# Patient Record
Sex: MALE | Race: ASIAN | NOT HISPANIC OR LATINO | URBAN - METROPOLITAN AREA
[De-identification: names, ages, dates, MRNs, and addresses within clinical notes are randomized per-mention and may not be internally consistent; named-entity substitution may affect disease eponyms.]

---

## 2017-01-12 ENCOUNTER — INPATIENT (INPATIENT)
Facility: HOSPITAL | Age: 32
LOS: 28 days | Discharge: HOME | DRG: 885 | End: 2017-02-10
Attending: PSYCHIATRY & NEUROLOGY | Admitting: PSYCHIATRY & NEUROLOGY
Payer: COMMERCIAL

## 2017-01-12 VITALS — TEMPERATURE: 98 F

## 2017-01-12 LAB
ALBUMIN SERPL ELPH-MCNC: 4.1 G/DL — SIGNIFICANT CHANGE UP (ref 3.4–5)
ALP SERPL-CCNC: 55 U/L — SIGNIFICANT CHANGE UP (ref 40–120)
ALT FLD-CCNC: 34 U/L — SIGNIFICANT CHANGE UP (ref 12–42)
ANION GAP SERPL CALC-SCNC: 15 MMOL/L — SIGNIFICANT CHANGE UP (ref 9–16)
APAP SERPL-MCNC: <2 UG/ML — SIGNIFICANT CHANGE UP (ref 10–30)
APPEARANCE UR: CLEAR — SIGNIFICANT CHANGE UP
APTT BLD: 29.8 SEC — SIGNIFICANT CHANGE UP (ref 27.5–36.5)
AST SERPL-CCNC: 14 U/L — LOW (ref 15–37)
BASOPHILS NFR BLD AUTO: 0.6 % — SIGNIFICANT CHANGE UP (ref 0–2)
BILIRUB SERPL-MCNC: 0.3 MG/DL — SIGNIFICANT CHANGE UP (ref 0.2–1.2)
BILIRUB UR-MCNC: NEGATIVE — SIGNIFICANT CHANGE UP
BUN SERPL-MCNC: 22 MG/DL — SIGNIFICANT CHANGE UP (ref 7–23)
CALCIUM SERPL-MCNC: 9.4 MG/DL — SIGNIFICANT CHANGE UP (ref 8.5–10.5)
CHLORIDE SERPL-SCNC: 104 MMOL/L — SIGNIFICANT CHANGE UP (ref 96–108)
CK MB BLD-MCNC: 0.97 % — SIGNIFICANT CHANGE UP
CK MB CFR SERPL CALC: 0.6 NG/ML — SIGNIFICANT CHANGE UP (ref 0.5–3.6)
CO2 SERPL-SCNC: 23 MMOL/L — SIGNIFICANT CHANGE UP (ref 22–31)
COLOR SPEC: YELLOW — SIGNIFICANT CHANGE UP
CREAT SERPL-MCNC: 1.24 MG/DL — SIGNIFICANT CHANGE UP (ref 0.5–1.3)
DIFF PNL FLD: NEGATIVE — SIGNIFICANT CHANGE UP
EOSINOPHIL NFR BLD AUTO: 1 % — SIGNIFICANT CHANGE UP (ref 0–6)
ETHANOL SERPL-MCNC: 100 MG/DL — HIGH
GLUCOSE SERPL-MCNC: 152 MG/DL — HIGH (ref 70–99)
GLUCOSE UR QL: NEGATIVE — SIGNIFICANT CHANGE UP
HCT VFR BLD CALC: 42.2 % — SIGNIFICANT CHANGE UP (ref 39–50)
HGB BLD-MCNC: 14.5 G/DL — SIGNIFICANT CHANGE UP (ref 13–17)
IMM GRANULOCYTES NFR BLD AUTO: 1 % — SIGNIFICANT CHANGE UP (ref 0–1.5)
INR BLD: 1.13 — SIGNIFICANT CHANGE UP (ref 0.88–1.16)
KETONES UR-MCNC: (no result) MG/DL
LACTATE SERPL-SCNC: 2.2 MMOL/L — HIGH (ref 0.4–2)
LACTATE SERPL-SCNC: 4 MMOL/L — CRITICAL HIGH (ref 0.4–2)
LACTATE SERPL-SCNC: 4.8 MMOL/L — CRITICAL HIGH (ref 0.4–2)
LEUKOCYTE ESTERASE UR-ACNC: NEGATIVE — SIGNIFICANT CHANGE UP
LIDOCAIN IGE QN: 87 U/L — SIGNIFICANT CHANGE UP (ref 73–393)
LYMPHOCYTES # BLD AUTO: 23.4 % — SIGNIFICANT CHANGE UP (ref 13–44)
MAGNESIUM SERPL-MCNC: 1.8 MG/DL — SIGNIFICANT CHANGE UP (ref 1.6–2.4)
MCHC RBC-ENTMCNC: 30.7 PG — SIGNIFICANT CHANGE UP (ref 27–34)
MCHC RBC-ENTMCNC: 34.4 G/DL — SIGNIFICANT CHANGE UP (ref 32–36)
MCV RBC AUTO: 89.2 FL — SIGNIFICANT CHANGE UP (ref 80–100)
MONOCYTES NFR BLD AUTO: 7 % — SIGNIFICANT CHANGE UP (ref 2–14)
NEUTROPHILS NFR BLD AUTO: 67 % — SIGNIFICANT CHANGE UP (ref 43–77)
NITRITE UR-MCNC: NEGATIVE — SIGNIFICANT CHANGE UP
PH UR: 6 — SIGNIFICANT CHANGE UP (ref 4–8.1)
PLATELET # BLD AUTO: 349 K/UL — SIGNIFICANT CHANGE UP (ref 150–400)
POTASSIUM SERPL-MCNC: 3.5 MMOL/L — SIGNIFICANT CHANGE UP (ref 3.5–5.3)
POTASSIUM SERPL-SCNC: 3.5 MMOL/L — SIGNIFICANT CHANGE UP (ref 3.5–5.3)
PROT SERPL-MCNC: 7.7 G/DL — SIGNIFICANT CHANGE UP (ref 6.4–8.2)
PROT UR-MCNC: 30 MG/DL
PROTHROM AB SERPL-ACNC: 12.5 SEC — SIGNIFICANT CHANGE UP (ref 10–13.1)
RBC # BLD: 4.73 M/UL — SIGNIFICANT CHANGE UP (ref 4.2–5.8)
RBC # FLD: 12.9 % — SIGNIFICANT CHANGE UP (ref 10.3–16.9)
SALICYLATES SERPL-MCNC: 1.3 MG/DL — LOW (ref 2.8–20)
SODIUM SERPL-SCNC: 142 MMOL/L — SIGNIFICANT CHANGE UP (ref 132–145)
SP GR SPEC: 1.02 — SIGNIFICANT CHANGE UP (ref 1–1.03)
TROPONIN I SERPL-MCNC: <0.017 NG/ML — LOW (ref 0.02–0.06)
UROBILINOGEN FLD QL: 0.2 E.U./DL — SIGNIFICANT CHANGE UP
WBC # BLD: 16.2 K/UL — HIGH (ref 3.8–10.5)
WBC # FLD AUTO: 16.2 K/UL — HIGH (ref 3.8–10.5)

## 2017-01-12 PROCEDURE — 93010 ELECTROCARDIOGRAM REPORT: CPT

## 2017-01-12 PROCEDURE — 71010: CPT | Mod: 26

## 2017-01-12 PROCEDURE — 71250 CT THORAX DX C-: CPT | Mod: 26

## 2017-01-12 PROCEDURE — 99291 CRITICAL CARE FIRST HOUR: CPT | Mod: 25

## 2017-01-12 PROCEDURE — 90792 PSYCH DIAG EVAL W/MED SRVCS: CPT | Mod: GT

## 2017-01-12 RX ORDER — PIPERACILLIN AND TAZOBACTAM 4; .5 G/20ML; G/20ML
3.38 INJECTION, POWDER, LYOPHILIZED, FOR SOLUTION INTRAVENOUS ONCE
Qty: 0 | Refills: 0 | Status: COMPLETED | OUTPATIENT
Start: 2017-01-12 | End: 2017-01-12

## 2017-01-12 RX ORDER — SODIUM CHLORIDE 9 MG/ML
2500 INJECTION INTRAMUSCULAR; INTRAVENOUS; SUBCUTANEOUS ONCE
Qty: 0 | Refills: 0 | Status: COMPLETED | OUTPATIENT
Start: 2017-01-12 | End: 2017-01-12

## 2017-01-12 RX ORDER — SODIUM CHLORIDE 9 MG/ML
1000 INJECTION INTRAMUSCULAR; INTRAVENOUS; SUBCUTANEOUS ONCE
Qty: 0 | Refills: 0 | Status: COMPLETED | OUTPATIENT
Start: 2017-01-12 | End: 2017-01-12

## 2017-01-12 RX ADMIN — PIPERACILLIN AND TAZOBACTAM 200 GRAM(S): 4; .5 INJECTION, POWDER, LYOPHILIZED, FOR SOLUTION INTRAVENOUS at 17:09

## 2017-01-12 RX ADMIN — SODIUM CHLORIDE 2000 MILLILITER(S): 9 INJECTION INTRAMUSCULAR; INTRAVENOUS; SUBCUTANEOUS at 17:09

## 2017-01-12 RX ADMIN — SODIUM CHLORIDE 1000 MILLILITER(S): 9 INJECTION INTRAMUSCULAR; INTRAVENOUS; SUBCUTANEOUS at 20:44

## 2017-01-12 NOTE — ED PROVIDER NOTE - PMH
ADD (attention deficit disorder)    Bipolar disorder    HTN (hypertension)    Polysubstance abuse    Seizures

## 2017-01-12 NOTE — ED PROVIDER NOTE - NS ED MD SCRIBE ATTENDING SCRIBE SECTIONS
HIV/PHYSICAL EXAM/REVIEW OF SYSTEMS/PAST MEDICAL/SURGICAL/SOCIAL HISTORY/HISTORY OF PRESENT ILLNESS/VITAL SIGNS( Pullset) HIV/VITAL SIGNS( Pullset)/PAST MEDICAL/SURGICAL/SOCIAL HISTORY/PROGRESS NOTE/REVIEW OF SYSTEMS/HISTORY OF PRESENT ILLNESS/PHYSICAL EXAM

## 2017-01-12 NOTE — ED PROVIDER NOTE - OBJECTIVE STATEMENT
32 y/o M BIBEMS after being found unresponsive outside, given 1mg Narcan intranasal by EMS. As per paperwork found on pt's person, he was discharges from psychiatric floor at [facility] today. Admission reason was suicidal ideation. Pt has history of bipolar effective d/o, alcohol use d/o, and polysubstance use d/o. Current medications include Risperdal, Lamictal, nicorette gum, and Atarax. 32 y/o M BIBEMS after being found unresponsive today. Given 1mg Narcan intranasal by EMS. Bystanders called after pt had a seizure and pt seized again en route to ED. As per paperwork found on pt's person, he was discharges from psychiatric floor at [facility] today. Admission reason was suicidal ideation. Pt has history of bipolar effective d/o, alcohol use d/o, and polysubstance use d/o. Current medications include Risperdal, Lamictal, nicorette gum, and Atarax. 32 y/o M BIBEMS after being found unresponsive today. Given 1mg Narcan intranasal by EMS. Bystanders called after pt had a seizure and pt seized again en route to ED. As per paperwork found on pt's person, he was discharges from psychiatric floor at [facility] today. Admission reason was suicidal ideation. Pt has history of bipolar effective d/o, alcohol use d/o, and polysubstance use d/o. Current medications include Risperdal, Lamictal, Nicorette gum, and Atarax.

## 2017-01-12 NOTE — ED PROVIDER NOTE - CRITICAL CARE PROVIDED
additional history taking/consultation with other physicians/interpretation of diagnostic studies/direct patient care (not related to procedure)/documentation

## 2017-01-12 NOTE — ED PROVIDER NOTE - SKIN, MLM
Skin normal color for race, warm, dry and intact. No evidence of rash. Not diaphoretic. No lacerations.

## 2017-01-12 NOTE — ED PROVIDER NOTE - MEDICAL DECISION MAKING DETAILS
Pt with AMS and tachycardia with borderline hypoTN on arrival.  IV, IV hydration, stat HCT, labs, CXR, ekg.  Poison control center was consulted.  Followed up with them once pt regained consciousness and denies any ingestion.  AMS likely 2/2 post ictal state.

## 2017-01-12 NOTE — ED PROVIDER NOTE - PROGRESS NOTE DETAILS
Pt is now awake and alert, still drowsy. Reports history of seizure d/o but cannot report what medication he takes for seizures. Denies drug use. Denies SI or suicide attempt today. Denies overdose on any of his medications. Will continue to observe. Given CXR findings will perform CT of chest to better delineate for PNA.  Pt now reporting SI again - no specific plan.  placed on 1:1.  Will consult psych once medically cleared. Called radiology resident and does not believe CT scan shows PNA.  More likely atelectasis.  Lactate is trending down and likely elevated from szr.  Szr likely break through szr given szr hx.  Pt is medically cleared for discharge or psych admission. CT inconclusive as per official report, will cover w azithro, pt looks well, no distress, ativan given as this is his usual "seizure medication", explained to Psych physician to give azithro 250 mg for 4 more days. Pt's lungs sounds are normal, no wheezing, no rhonchi. Stable for admission to Psych

## 2017-01-12 NOTE — ED BEHAVIORAL HEALTH NOTE - BEHAVIORAL HEALTH NOTE
Meeting with patient attempted however Full Screen Not Performed due to   Severity of illness/mental status

## 2017-01-13 DIAGNOSIS — F10.20 ALCOHOL DEPENDENCE, UNCOMPLICATED: ICD-10-CM

## 2017-01-13 DIAGNOSIS — F12.20 CANNABIS DEPENDENCE, UNCOMPLICATED: ICD-10-CM

## 2017-01-13 DIAGNOSIS — F19.94 OTHER PSYCHOACTIVE SUBSTANCE USE, UNSPECIFIED WITH PSYCHOACTIVE SUBSTANCE-INDUCED MOOD DISORDER: ICD-10-CM

## 2017-01-13 LAB
CULTURE RESULTS: NO GROWTH — SIGNIFICANT CHANGE UP
SPECIMEN SOURCE: SIGNIFICANT CHANGE UP

## 2017-01-13 PROCEDURE — 99223 1ST HOSP IP/OBS HIGH 75: CPT

## 2017-01-13 RX ORDER — HYDROXYZINE HCL 10 MG
50 TABLET ORAL AT BEDTIME
Qty: 0 | Refills: 0 | Status: DISCONTINUED | OUTPATIENT
Start: 2017-01-13 | End: 2017-02-10

## 2017-01-13 RX ORDER — NICOTINE POLACRILEX 2 MG
1 GUM BUCCAL DAILY
Qty: 0 | Refills: 0 | Status: DISCONTINUED | OUTPATIENT
Start: 2017-01-13 | End: 2017-02-10

## 2017-01-13 RX ORDER — AZITHROMYCIN 500 MG/1
250 TABLET, FILM COATED ORAL DAILY
Qty: 0 | Refills: 0 | Status: COMPLETED | OUTPATIENT
Start: 2017-01-13 | End: 2017-01-16

## 2017-01-13 RX ORDER — AZITHROMYCIN 500 MG/1
500 TABLET, FILM COATED ORAL ONCE
Qty: 0 | Refills: 0 | Status: COMPLETED | OUTPATIENT
Start: 2017-01-13 | End: 2017-01-13

## 2017-01-13 RX ORDER — NICOTINE POLACRILEX 2 MG
2 GUM BUCCAL
Qty: 0 | Refills: 0 | Status: DISCONTINUED | OUTPATIENT
Start: 2017-01-13 | End: 2017-02-10

## 2017-01-13 RX ORDER — HYDROXYZINE HCL 10 MG
50 TABLET ORAL EVERY 8 HOURS
Qty: 0 | Refills: 0 | Status: DISCONTINUED | OUTPATIENT
Start: 2017-01-13 | End: 2017-02-10

## 2017-01-13 RX ORDER — RISPERIDONE 4 MG/1
2 TABLET ORAL AT BEDTIME
Qty: 0 | Refills: 0 | Status: DISCONTINUED | OUTPATIENT
Start: 2017-01-13 | End: 2017-01-26

## 2017-01-13 RX ORDER — LAMOTRIGINE 25 MG/1
50 TABLET, ORALLY DISINTEGRATING ORAL AT BEDTIME
Qty: 0 | Refills: 0 | Status: DISCONTINUED | OUTPATIENT
Start: 2017-01-13 | End: 2017-01-23

## 2017-01-13 RX ORDER — IBUPROFEN 200 MG
600 TABLET ORAL ONCE
Qty: 0 | Refills: 0 | Status: COMPLETED | OUTPATIENT
Start: 2017-01-13 | End: 2017-01-13

## 2017-01-13 RX ORDER — RISPERIDONE 4 MG/1
1 TABLET ORAL DAILY
Qty: 0 | Refills: 0 | Status: DISCONTINUED | OUTPATIENT
Start: 2017-01-13 | End: 2017-01-26

## 2017-01-13 RX ADMIN — Medication 2 MILLIGRAM(S): at 12:33

## 2017-01-13 RX ADMIN — AZITHROMYCIN 250 MILLIGRAM(S): 500 TABLET, FILM COATED ORAL at 09:37

## 2017-01-13 RX ADMIN — Medication 1 PATCH: at 09:37

## 2017-01-13 RX ADMIN — AZITHROMYCIN 500 MILLIGRAM(S): 500 TABLET, FILM COATED ORAL at 02:22

## 2017-01-13 RX ADMIN — Medication 2 MILLIGRAM(S): at 04:13

## 2017-01-13 RX ADMIN — RISPERIDONE 1 MILLIGRAM(S): 4 TABLET ORAL at 09:37

## 2017-01-13 RX ADMIN — Medication 1 MILLIGRAM(S): at 13:10

## 2017-01-13 RX ADMIN — Medication 1 MILLIGRAM(S): at 02:23

## 2017-01-13 RX ADMIN — Medication 2 MILLIGRAM(S): at 16:15

## 2017-01-13 RX ADMIN — Medication 50 MILLIGRAM(S): at 16:15

## 2017-01-13 RX ADMIN — Medication 1 MILLIGRAM(S): at 00:56

## 2017-01-13 RX ADMIN — LAMOTRIGINE 50 MILLIGRAM(S): 25 TABLET, ORALLY DISINTEGRATING ORAL at 21:36

## 2017-01-13 RX ADMIN — Medication 2 MILLIGRAM(S): at 08:57

## 2017-01-13 RX ADMIN — Medication 2 MILLIGRAM(S): at 08:56

## 2017-01-13 RX ADMIN — Medication 50 MILLIGRAM(S): at 21:36

## 2017-01-13 RX ADMIN — Medication 600 MILLIGRAM(S): at 02:23

## 2017-01-13 RX ADMIN — RISPERIDONE 2 MILLIGRAM(S): 4 TABLET ORAL at 21:36

## 2017-01-13 NOTE — ED BEHAVIORAL HEALTH ASSESSMENT NOTE - OTHER PAST PSYCHIATRIC HISTORY (INCLUDE DETAILS REGARDING ONSET, COURSE OF ILLNESS, INPATIENT/OUTPATIENT TREATMENT)
no current outpatient provider; discharged today from  was admitted on 12/26/17 for suicidal ideation. Prior pt was last admitted to [facility] 1 week and discharged to [facility] for substance abuse tx.  He says he signed himself out after 6 days on Zaria Maribel.  He was also in tx at [facility] in [place] 3 months prior.  History of non-compliant with medications. History of multiple suicide attempts in setting of substance use. Multiple failed attempts at substance treatment.

## 2017-01-13 NOTE — ED BEHAVIORAL HEALTH ASSESSMENT NOTE - TREATMENT
Hx of outpatient detox tx as well as rehab (last in August or September), no history of inpatient [this is per 12/6 note] History of methadone program last in November

## 2017-01-13 NOTE — ED BEHAVIORAL HEALTH ASSESSMENT NOTE - AXIS IV
Problems with primary support/Problems with access to healthcare services/Problem related to social environment/Housing problems

## 2017-01-13 NOTE — ED BEHAVIORAL HEALTH ASSESSMENT NOTE - OTHER
bystander denies at time of interview however unreliable as patient may be denying in order to be discharged concrete "fine" unable to stand still akathisia superficially cooperative

## 2017-01-13 NOTE — ED BEHAVIORAL HEALTH ASSESSMENT NOTE - DESCRIPTION
Patient arrived to ED via EMS called by bystander who found him unresponsive after having a seizure. On initial arrival was noted to be minimally responsive to sternal rub. He was tachycardic and hypotensive. Patient was given zosyn in case of sepsis and fluids. Blood work revealed increased lactate and blood alcohol level of 100. Urine drug screen was negative. CT head unremarkable. Patient became more awake and alert but still drowsy and reported had history of seizure disorder but could not remember his medication. CXR obtained and showed possible pneumonia versus atelectasis. CT chest was inconclusive. Lactate was trending down. Events thought to be breakthrough seizure. On several evaluations patient reported feeling suicidal but later retracted. He was placed on 1:1 and telepsychiatry consult was placed. During psychiatric evaluation, patient was noted to have significant akathisia and was unable to sit still. HTN, seizures Patient was kicked out of parents' house 2 years ago 2/2 substances, becoming aggressive, stealing to buy drugs. Completed high school and attended community college 1 year (culinary school).

## 2017-01-13 NOTE — ED BEHAVIORAL HEALTH NOTE - BEHAVIORAL HEALTH NOTE
[facility] reviewed records. Patient was hospitalized at [address] from 12/26 to 1/12/17, and was discharged with a plan to go to [address] with an appointment at [facility] 1/18/17. Patient was admitted to [address] on 12/26 after expressing SI with plan. Patient arrived to ED with BAL of 100.     [facility] attempted to reach patient's emergency contact mother Arnel Wayne, but was unable to reach mom and mom does not have voicemail set up. See [facility] Nikki Alfaro's BH note from 12/26 for collateral information from mom.      Telepsychiatry Encounter  I have visualized that the patient is on an arms-length 1:1.  I have visualized that the patient is in a private space.  I have confirmed with the patient that they understanding and agree to the evaluation being performed via Telepsychiatry.    During call, patient stated that he "made a mistake," and elaborated that he was heading to the shelter and then drank.     I have discussed the above with Telepsychiatry Attending Dr. Hernandez [facility] reviewed records. Patient was hospitalized at [address] from 12/26 to 1/12/17, and was discharged with a plan to go to [address] with an appointment at [facility] 1/18/17. Patient was admitted to [address] on 12/26 after expressing SI with plan. Patient arrived to ED with BAL of 100.     [facility] attempted to reach patient's emergency contact mother Arnel Wayne ([phone]), but was unable to reach mom and mom does not have voicemail set up. See [facility] Nikki Alfaro's BH note from 12/26 for collateral information from mom.      Telepsychiatry Encounter  I have visualized that the patient is on an arms-length 1:1.  I have visualized that the patient is in a private space.  I have confirmed with the patient that they understanding and agree to the evaluation being performed via Telepsychiatry.    During call, patient stated that he "made a mistake," and elaborated that he was heading to the shelter and then drank, which was a mistake.    I have discussed the above with Telepsychiatry Attending Dr. Hernandez [facility] reviewed records. No [facility] or [facility] records. Patient was hospitalized at [address] from 12/26 to 1/12/17, and was discharged with a plan to go to [address] [place] [address] with an appointment at [facility] 1/18/17. Patient was admitted to [address] on 12/26 after expressing SI with plan. Patient arrived to ED today with BAL of 100.     [facility] attempted to reach patient's emergency contact mother Arnel Wayne ([phone]), but was unable to reach mom and mom does not have voicemail set up. See [facility] Nikki Alfaro's BH note from 12/26 for collateral information from mom.      Telepsychiatry Encounter  I have visualized that the patient is on an arms-length 1:1.  I have visualized that the patient is in a private space.  I have confirmed with the patient that they understanding and agree to the evaluation being performed via Telepsychiatry.    During call, patient stated that he "made a mistake," and elaborated that he was heading to the shelter and then drank, which was a mistake.    I have discussed the above with Telepsychiatry Attending Dr. Hernandez    Patient will be transferred on a voluntary basis to [address]. [facility] requested that completed voluntary admission form 9.13 be sent to worker prior to patient's transport.

## 2017-01-13 NOTE — ED BEHAVIORAL HEALTH ASSESSMENT NOTE - PAST PSYCHOTROPIC MEDICATION
Celexa 10 mg po qAM, Lamictal 75 mg po BID, Thorazine 100 mg po PRN, Effexor, Strattera, lithium, wellbutrin, Librium

## 2017-01-13 NOTE — ED BEHAVIORAL HEALTH ASSESSMENT NOTE - PSYCHIATRIC ISSUES AND PLAN (INCLUDE STANDING AND PRN MEDICATION)
Reduce risperidone to 2mg po daily and 1mg po at bedtime due to akathisia, Lamictal 50mg po qhs, atarax 50mg po qhs prn insomnia, consider long term placement Reduce risperidone to 1mg po daily and 2mg po at bedtime due to akathisia, Lamictal 50mg po qhs, atarax 50mg po qhs prn insomnia, consider long term placement Reduce risperidone to 1mg po daily and 2mg po at bedtime due to akathisia, Lamictal 50mg po qhs, atarax 50mg po qhs prn insomnia, ativan 2mg po q4h prn anxiety, consider long term placement

## 2017-01-13 NOTE — ED BEHAVIORAL HEALTH ASSESSMENT NOTE - HPI (INCLUDE ILLNESS QUALITY, SEVERITY, DURATION, TIMING, CONTEXT, MODIFYING FACTORS, ASSOCIATED SIGNS AND SYMPTOMS)
Pt is a 32 y/o single  male, non-caregiver, living at different shelters, with hx of depression and anxiety (?bipolar or substance-induced mood disorder), hx of ETOH, cocaine, marijuana and K2, opiate abuse, hx of multiple suicide attempts in the past via overdose often in setting of substance use, extensive hx of inpatient psychiatric hospitalizations last here at [facility] discharged today, no known legal hx, history of non-compliance with treatment, failed rehab and outpatient treatment programs, BIBA after being found unresponsive and shaking on the street in context of alcohol and K2 use several hours after discharge from . While being treated in ED patient reported feeling suicidal on several occasions while intoxicated though later retracted. On evaluation, patient reports that when he was discharged from the hospital he was feeling suicidal and felt like jumping in front of a car but did not do it. Instead he drank 1/2 pint of alcohol and smoked K2. Denies feeling suicidal anymore at time of interview but admits that these thoughts come and go and that he likely will not be able to avoid drinking, which he acknowledges makes him feel even more suicidal. Denies that the drinking and drug use were a suicide attempt but has had several drug overdoses in the past as suicide attempt.

## 2017-01-13 NOTE — ED ADULT NURSE REASSESSMENT NOTE - NS ED NURSE REASSESS COMMENT FT1
Pt and belongings wanded by security. Pt to be admitted to [facility] for depression and suicidal ideation.
Pt denies any suicidal ideations at this time. Pt maintained on 1:1 constant observation.

## 2017-01-13 NOTE — ED BEHAVIORAL HEALTH ASSESSMENT NOTE - MEDICAL ISSUES AND PLAN (INCLUDE STANDING AND PRN MEDICATION)
Per med MD, azithromycin 250mg po daily x 4 daily for possible bronchitis/irregular community pna Per med MD, azithromycin 250mg po daily x 4 daily for possible bronchitis/irregular community pna, seizure precautions

## 2017-01-13 NOTE — ED BEHAVIORAL HEALTH ASSESSMENT NOTE - DETAILS
Earlier today reported suicidal ideation to jump in front of car. Pt states that he tried to take Tylenol and drown himself 3 months prior to last hospitalization.  He says he also tried to drown himself 1 month prior at a motel. Handoff given to nurse N/A discharged from [facility] today to follow in am per collateral from last assessment, patient has history of aggression towards mother in past h/o of alcohol related seizures - unclear if any related to withdrawal or whether connected to drug/alcohol intoxication

## 2017-01-13 NOTE — ED BEHAVIORAL HEALTH ASSESSMENT NOTE - SUICIDE RISK FACTORS
Substance abuse/dependence/Unable to engage in safety planning/Mood episode/Agitation/severe anxiety/Access to means (pills, firearms, etc.)

## 2017-01-13 NOTE — ED BEHAVIORAL HEALTH ASSESSMENT NOTE - DESCRIPTION (FIRST USE, LAST USE, QUANTITY, FREQUENCY, DURATION)
"Drinks 2-3 pints of alcohol daily", last drank 1/2 pint vodka today Started at age 16, reports use once a week, last used K2 today Last use 2 days prior to [facility] ED presentation on 12/6; as per previous records, "says he uses once a month and buys $300 worth" As per previous records from 12/6/1, "Last use 2 days ago; says he uses once a month". smokes cigarettes PCP, mushrooms, LSD

## 2017-01-13 NOTE — ED BEHAVIORAL HEALTH ASSESSMENT NOTE - RISK ASSESSMENT
Risk factors include hx of substance abuse, hx of multiple past inpatient psychiatric hospitalizations and suicide attempts, no outpatient psychiatric tx, non-compliance.

## 2017-01-14 LAB
HAV IGM SER-ACNC: SIGNIFICANT CHANGE UP
HBV CORE IGM SER-ACNC: SIGNIFICANT CHANGE UP
HBV SURFACE AG SER-ACNC: SIGNIFICANT CHANGE UP
HCV AB S/CO SERPL IA: 0.04 S/CO — SIGNIFICANT CHANGE UP
HCV AB SERPL-IMP: SIGNIFICANT CHANGE UP
T PALLIDUM AB TITR SER: NEGATIVE — SIGNIFICANT CHANGE UP

## 2017-01-14 PROCEDURE — 99231 SBSQ HOSP IP/OBS SF/LOW 25: CPT

## 2017-01-14 RX ORDER — IBUPROFEN 200 MG
600 TABLET ORAL EVERY 6 HOURS
Qty: 0 | Refills: 0 | Status: DISCONTINUED | OUTPATIENT
Start: 2017-01-14 | End: 2017-02-10

## 2017-01-14 RX ADMIN — LAMOTRIGINE 50 MILLIGRAM(S): 25 TABLET, ORALLY DISINTEGRATING ORAL at 22:06

## 2017-01-14 RX ADMIN — Medication 1 PATCH: at 13:32

## 2017-01-14 RX ADMIN — Medication 1 PATCH: at 09:56

## 2017-01-14 RX ADMIN — RISPERIDONE 2 MILLIGRAM(S): 4 TABLET ORAL at 22:06

## 2017-01-14 RX ADMIN — Medication 600 MILLIGRAM(S): at 22:06

## 2017-01-14 RX ADMIN — Medication 2 MILLIGRAM(S): at 22:06

## 2017-01-14 RX ADMIN — AZITHROMYCIN 250 MILLIGRAM(S): 500 TABLET, FILM COATED ORAL at 09:56

## 2017-01-14 RX ADMIN — Medication 50 MILLIGRAM(S): at 22:05

## 2017-01-14 RX ADMIN — Medication 2 MILLIGRAM(S): at 15:24

## 2017-01-14 RX ADMIN — RISPERIDONE 1 MILLIGRAM(S): 4 TABLET ORAL at 09:56

## 2017-01-14 RX ADMIN — Medication 50 MILLIGRAM(S): at 09:56

## 2017-01-14 RX ADMIN — Medication 600 MILLIGRAM(S): at 22:57

## 2017-01-15 LAB
FOLATE SERPL-MCNC: 13.2 NG/ML — SIGNIFICANT CHANGE UP (ref 4.8–24.2)
VIT B12 SERPL-MCNC: 506 PG/ML — SIGNIFICANT CHANGE UP (ref 243–894)

## 2017-01-15 PROCEDURE — 99231 SBSQ HOSP IP/OBS SF/LOW 25: CPT

## 2017-01-15 RX ADMIN — Medication 600 MILLIGRAM(S): at 15:07

## 2017-01-15 RX ADMIN — Medication 600 MILLIGRAM(S): at 17:21

## 2017-01-15 RX ADMIN — RISPERIDONE 1 MILLIGRAM(S): 4 TABLET ORAL at 10:45

## 2017-01-15 RX ADMIN — LAMOTRIGINE 50 MILLIGRAM(S): 25 TABLET, ORALLY DISINTEGRATING ORAL at 21:34

## 2017-01-15 RX ADMIN — RISPERIDONE 2 MILLIGRAM(S): 4 TABLET ORAL at 21:34

## 2017-01-15 RX ADMIN — Medication 50 MILLIGRAM(S): at 15:07

## 2017-01-15 RX ADMIN — Medication 1 PATCH: at 10:00

## 2017-01-15 RX ADMIN — AZITHROMYCIN 250 MILLIGRAM(S): 500 TABLET, FILM COATED ORAL at 10:40

## 2017-01-15 RX ADMIN — Medication 2 MILLIGRAM(S): at 17:41

## 2017-01-15 RX ADMIN — Medication 2 MILLIGRAM(S): at 15:07

## 2017-01-15 RX ADMIN — Medication 1 PATCH: at 10:42

## 2017-01-16 PROCEDURE — 99231 SBSQ HOSP IP/OBS SF/LOW 25: CPT

## 2017-01-16 RX ADMIN — Medication 2 MILLIGRAM(S): at 17:12

## 2017-01-16 RX ADMIN — AZITHROMYCIN 250 MILLIGRAM(S): 500 TABLET, FILM COATED ORAL at 09:58

## 2017-01-16 RX ADMIN — LAMOTRIGINE 50 MILLIGRAM(S): 25 TABLET, ORALLY DISINTEGRATING ORAL at 21:31

## 2017-01-16 RX ADMIN — Medication 2 MILLIGRAM(S): at 12:16

## 2017-01-16 RX ADMIN — Medication 1 PATCH: at 10:59

## 2017-01-16 RX ADMIN — Medication 1 PATCH: at 09:58

## 2017-01-16 RX ADMIN — RISPERIDONE 1 MILLIGRAM(S): 4 TABLET ORAL at 09:58

## 2017-01-16 RX ADMIN — Medication 50 MILLIGRAM(S): at 21:31

## 2017-01-16 RX ADMIN — RISPERIDONE 2 MILLIGRAM(S): 4 TABLET ORAL at 21:31

## 2017-01-17 PROCEDURE — 99232 SBSQ HOSP IP/OBS MODERATE 35: CPT

## 2017-01-17 RX ADMIN — LAMOTRIGINE 50 MILLIGRAM(S): 25 TABLET, ORALLY DISINTEGRATING ORAL at 21:22

## 2017-01-17 RX ADMIN — RISPERIDONE 1 MILLIGRAM(S): 4 TABLET ORAL at 10:12

## 2017-01-17 RX ADMIN — RISPERIDONE 2 MILLIGRAM(S): 4 TABLET ORAL at 21:22

## 2017-01-17 RX ADMIN — Medication 2 MILLIGRAM(S): at 18:58

## 2017-01-17 RX ADMIN — Medication 1 PATCH: at 11:08

## 2017-01-17 RX ADMIN — Medication 600 MILLIGRAM(S): at 21:21

## 2017-01-17 RX ADMIN — Medication 50 MILLIGRAM(S): at 21:22

## 2017-01-17 RX ADMIN — Medication 600 MILLIGRAM(S): at 22:30

## 2017-01-17 RX ADMIN — Medication 1 PATCH: at 10:12

## 2017-01-17 RX ADMIN — Medication 2 MILLIGRAM(S): at 15:00

## 2017-01-18 LAB
CULTURE RESULTS: SIGNIFICANT CHANGE UP
CULTURE RESULTS: SIGNIFICANT CHANGE UP
SPECIMEN SOURCE: SIGNIFICANT CHANGE UP
SPECIMEN SOURCE: SIGNIFICANT CHANGE UP

## 2017-01-18 PROCEDURE — 99232 SBSQ HOSP IP/OBS MODERATE 35: CPT

## 2017-01-18 RX ADMIN — RISPERIDONE 1 MILLIGRAM(S): 4 TABLET ORAL at 10:47

## 2017-01-18 RX ADMIN — LAMOTRIGINE 50 MILLIGRAM(S): 25 TABLET, ORALLY DISINTEGRATING ORAL at 21:47

## 2017-01-18 RX ADMIN — Medication 1 PATCH: at 10:47

## 2017-01-18 RX ADMIN — RISPERIDONE 2 MILLIGRAM(S): 4 TABLET ORAL at 21:47

## 2017-01-18 RX ADMIN — Medication 1 PATCH: at 10:48

## 2017-01-18 RX ADMIN — Medication 600 MILLIGRAM(S): at 17:58

## 2017-01-18 RX ADMIN — Medication 2 MILLIGRAM(S): at 10:46

## 2017-01-18 RX ADMIN — Medication 600 MILLIGRAM(S): at 17:57

## 2017-01-18 RX ADMIN — Medication 2 MILLIGRAM(S): at 12:48

## 2017-01-18 RX ADMIN — Medication 50 MILLIGRAM(S): at 21:47

## 2017-01-19 PROCEDURE — 99232 SBSQ HOSP IP/OBS MODERATE 35: CPT

## 2017-01-19 RX ADMIN — Medication 600 MILLIGRAM(S): at 09:26

## 2017-01-19 RX ADMIN — Medication 600 MILLIGRAM(S): at 11:24

## 2017-01-19 RX ADMIN — Medication 2 MILLIGRAM(S): at 12:38

## 2017-01-19 RX ADMIN — RISPERIDONE 2 MILLIGRAM(S): 4 TABLET ORAL at 21:30

## 2017-01-19 RX ADMIN — Medication 2 MILLIGRAM(S): at 21:30

## 2017-01-19 RX ADMIN — Medication 2 MILLIGRAM(S): at 09:26

## 2017-01-19 RX ADMIN — LAMOTRIGINE 50 MILLIGRAM(S): 25 TABLET, ORALLY DISINTEGRATING ORAL at 21:30

## 2017-01-19 RX ADMIN — Medication 50 MILLIGRAM(S): at 21:30

## 2017-01-19 RX ADMIN — Medication 2 MILLIGRAM(S): at 19:29

## 2017-01-19 RX ADMIN — RISPERIDONE 1 MILLIGRAM(S): 4 TABLET ORAL at 09:26

## 2017-01-19 RX ADMIN — Medication 2 MILLIGRAM(S): at 16:37

## 2017-01-19 RX ADMIN — Medication 600 MILLIGRAM(S): at 16:37

## 2017-01-19 RX ADMIN — Medication 1 PATCH: at 09:25

## 2017-01-20 PROCEDURE — 99232 SBSQ HOSP IP/OBS MODERATE 35: CPT

## 2017-01-20 RX ADMIN — Medication 600 MILLIGRAM(S): at 17:23

## 2017-01-20 RX ADMIN — Medication 2 MILLIGRAM(S): at 14:40

## 2017-01-20 RX ADMIN — Medication 600 MILLIGRAM(S): at 17:22

## 2017-01-20 RX ADMIN — RISPERIDONE 2 MILLIGRAM(S): 4 TABLET ORAL at 21:38

## 2017-01-20 RX ADMIN — Medication 2 MILLIGRAM(S): at 12:15

## 2017-01-20 RX ADMIN — Medication 50 MILLIGRAM(S): at 21:38

## 2017-01-20 RX ADMIN — Medication 1 PATCH: at 11:14

## 2017-01-20 RX ADMIN — Medication 600 MILLIGRAM(S): at 11:14

## 2017-01-20 RX ADMIN — Medication 1 PATCH: at 10:25

## 2017-01-20 RX ADMIN — RISPERIDONE 1 MILLIGRAM(S): 4 TABLET ORAL at 10:25

## 2017-01-20 RX ADMIN — Medication 2 MILLIGRAM(S): at 17:22

## 2017-01-20 RX ADMIN — Medication 600 MILLIGRAM(S): at 10:25

## 2017-01-20 RX ADMIN — LAMOTRIGINE 50 MILLIGRAM(S): 25 TABLET, ORALLY DISINTEGRATING ORAL at 21:37

## 2017-01-21 RX ADMIN — LAMOTRIGINE 50 MILLIGRAM(S): 25 TABLET, ORALLY DISINTEGRATING ORAL at 21:52

## 2017-01-21 RX ADMIN — RISPERIDONE 1 MILLIGRAM(S): 4 TABLET ORAL at 11:03

## 2017-01-21 RX ADMIN — RISPERIDONE 2 MILLIGRAM(S): 4 TABLET ORAL at 21:52

## 2017-01-21 RX ADMIN — Medication 1 PATCH: at 11:03

## 2017-01-21 RX ADMIN — Medication 50 MILLIGRAM(S): at 16:02

## 2017-01-21 RX ADMIN — Medication 600 MILLIGRAM(S): at 11:03

## 2017-01-21 RX ADMIN — Medication 50 MILLIGRAM(S): at 21:53

## 2017-01-21 RX ADMIN — Medication 2 MILLIGRAM(S): at 16:02

## 2017-01-21 RX ADMIN — Medication 1 PATCH: at 20:00

## 2017-01-21 RX ADMIN — Medication 2 MILLIGRAM(S): at 11:06

## 2017-01-21 RX ADMIN — Medication 600 MILLIGRAM(S): at 12:34

## 2017-01-22 RX ADMIN — Medication 600 MILLIGRAM(S): at 10:19

## 2017-01-22 RX ADMIN — Medication 600 MILLIGRAM(S): at 19:46

## 2017-01-22 RX ADMIN — Medication 600 MILLIGRAM(S): at 11:39

## 2017-01-22 RX ADMIN — Medication 2 MILLIGRAM(S): at 21:14

## 2017-01-22 RX ADMIN — Medication 1 PATCH: at 10:19

## 2017-01-22 RX ADMIN — RISPERIDONE 2 MILLIGRAM(S): 4 TABLET ORAL at 21:14

## 2017-01-22 RX ADMIN — Medication 2 MILLIGRAM(S): at 12:36

## 2017-01-22 RX ADMIN — RISPERIDONE 1 MILLIGRAM(S): 4 TABLET ORAL at 10:19

## 2017-01-22 RX ADMIN — Medication 50 MILLIGRAM(S): at 21:14

## 2017-01-22 RX ADMIN — Medication 2 MILLIGRAM(S): at 18:56

## 2017-01-22 RX ADMIN — Medication 600 MILLIGRAM(S): at 18:57

## 2017-01-22 RX ADMIN — LAMOTRIGINE 50 MILLIGRAM(S): 25 TABLET, ORALLY DISINTEGRATING ORAL at 21:14

## 2017-01-22 RX ADMIN — Medication 2 MILLIGRAM(S): at 10:19

## 2017-01-23 PROCEDURE — 99232 SBSQ HOSP IP/OBS MODERATE 35: CPT

## 2017-01-23 RX ORDER — RISPERIDONE 4 MG/1
25 TABLET ORAL EVERY 2 WEEKS
Qty: 0 | Refills: 0 | Status: DISCONTINUED | OUTPATIENT
Start: 2017-01-25 | End: 2017-02-03

## 2017-01-23 RX ORDER — LAMOTRIGINE 25 MG/1
75 TABLET, ORALLY DISINTEGRATING ORAL AT BEDTIME
Qty: 0 | Refills: 0 | Status: DISCONTINUED | OUTPATIENT
Start: 2017-01-23 | End: 2017-02-07

## 2017-01-23 RX ADMIN — Medication 50 MILLIGRAM(S): at 17:45

## 2017-01-23 RX ADMIN — Medication 50 MILLIGRAM(S): at 21:17

## 2017-01-23 RX ADMIN — Medication 2 MILLIGRAM(S): at 14:35

## 2017-01-23 RX ADMIN — Medication 1 PATCH: at 10:00

## 2017-01-23 RX ADMIN — Medication 2 MILLIGRAM(S): at 21:17

## 2017-01-23 RX ADMIN — Medication 1 PATCH: at 10:59

## 2017-01-23 RX ADMIN — Medication 600 MILLIGRAM(S): at 17:44

## 2017-01-23 RX ADMIN — RISPERIDONE 1 MILLIGRAM(S): 4 TABLET ORAL at 10:57

## 2017-01-23 RX ADMIN — LAMOTRIGINE 75 MILLIGRAM(S): 25 TABLET, ORALLY DISINTEGRATING ORAL at 21:17

## 2017-01-23 RX ADMIN — Medication 2 MILLIGRAM(S): at 10:57

## 2017-01-23 RX ADMIN — RISPERIDONE 2 MILLIGRAM(S): 4 TABLET ORAL at 21:17

## 2017-01-23 RX ADMIN — Medication 2 MILLIGRAM(S): at 13:05

## 2017-01-23 RX ADMIN — Medication 600 MILLIGRAM(S): at 17:53

## 2017-01-23 RX ADMIN — Medication 2 MILLIGRAM(S): at 17:46

## 2017-01-24 PROCEDURE — 99232 SBSQ HOSP IP/OBS MODERATE 35: CPT

## 2017-01-24 RX ADMIN — Medication 2 MILLIGRAM(S): at 16:08

## 2017-01-24 RX ADMIN — Medication 2 MILLIGRAM(S): at 10:38

## 2017-01-24 RX ADMIN — Medication 600 MILLIGRAM(S): at 10:39

## 2017-01-24 RX ADMIN — Medication 600 MILLIGRAM(S): at 11:45

## 2017-01-24 RX ADMIN — Medication 1 PATCH: at 10:40

## 2017-01-24 RX ADMIN — Medication 2 MILLIGRAM(S): at 21:16

## 2017-01-24 RX ADMIN — Medication 1 PATCH: at 10:38

## 2017-01-24 RX ADMIN — LAMOTRIGINE 75 MILLIGRAM(S): 25 TABLET, ORALLY DISINTEGRATING ORAL at 21:14

## 2017-01-24 RX ADMIN — Medication 50 MILLIGRAM(S): at 16:08

## 2017-01-24 RX ADMIN — Medication 2 MILLIGRAM(S): at 13:09

## 2017-01-24 RX ADMIN — RISPERIDONE 2 MILLIGRAM(S): 4 TABLET ORAL at 21:14

## 2017-01-24 RX ADMIN — RISPERIDONE 1 MILLIGRAM(S): 4 TABLET ORAL at 10:38

## 2017-01-25 PROCEDURE — 99232 SBSQ HOSP IP/OBS MODERATE 35: CPT

## 2017-01-25 RX ADMIN — Medication 1 PATCH: at 10:58

## 2017-01-25 RX ADMIN — Medication 2 MILLIGRAM(S): at 21:43

## 2017-01-25 RX ADMIN — Medication 600 MILLIGRAM(S): at 18:32

## 2017-01-25 RX ADMIN — Medication 600 MILLIGRAM(S): at 19:35

## 2017-01-25 RX ADMIN — Medication 2 MILLIGRAM(S): at 10:16

## 2017-01-25 RX ADMIN — LAMOTRIGINE 75 MILLIGRAM(S): 25 TABLET, ORALLY DISINTEGRATING ORAL at 21:43

## 2017-01-25 RX ADMIN — Medication 2 MILLIGRAM(S): at 18:32

## 2017-01-25 RX ADMIN — RISPERIDONE 25 MILLIGRAM(S): 4 TABLET ORAL at 10:55

## 2017-01-25 RX ADMIN — Medication 50 MILLIGRAM(S): at 14:05

## 2017-01-25 RX ADMIN — Medication 2 MILLIGRAM(S): at 13:30

## 2017-01-25 RX ADMIN — Medication 50 MILLIGRAM(S): at 00:00

## 2017-01-25 RX ADMIN — RISPERIDONE 1 MILLIGRAM(S): 4 TABLET ORAL at 10:16

## 2017-01-25 RX ADMIN — RISPERIDONE 2 MILLIGRAM(S): 4 TABLET ORAL at 21:43

## 2017-01-25 RX ADMIN — Medication 2 MILLIGRAM(S): at 15:57

## 2017-01-25 RX ADMIN — Medication 1 PATCH: at 10:16

## 2017-01-26 PROCEDURE — 99232 SBSQ HOSP IP/OBS MODERATE 35: CPT

## 2017-01-26 RX ORDER — RISPERIDONE 4 MG/1
12.5 TABLET ORAL ONCE
Qty: 0 | Refills: 0 | Status: COMPLETED | OUTPATIENT
Start: 2017-01-27 | End: 2017-01-27

## 2017-01-26 RX ADMIN — RISPERIDONE 1 MILLIGRAM(S): 4 TABLET ORAL at 10:08

## 2017-01-26 RX ADMIN — Medication 1 PATCH: at 10:08

## 2017-01-26 RX ADMIN — Medication 2 MILLIGRAM(S): at 10:08

## 2017-01-26 RX ADMIN — Medication 2 MILLIGRAM(S): at 14:15

## 2017-01-26 RX ADMIN — LAMOTRIGINE 75 MILLIGRAM(S): 25 TABLET, ORALLY DISINTEGRATING ORAL at 21:36

## 2017-01-26 RX ADMIN — Medication 50 MILLIGRAM(S): at 21:36

## 2017-01-26 RX ADMIN — Medication 2 MILLIGRAM(S): at 19:03

## 2017-01-26 RX ADMIN — Medication 1 PATCH: at 14:14

## 2017-01-26 RX ADMIN — Medication 2 MILLIGRAM(S): at 16:31

## 2017-01-27 PROCEDURE — 99232 SBSQ HOSP IP/OBS MODERATE 35: CPT

## 2017-01-27 RX ADMIN — Medication 1 PATCH: at 10:36

## 2017-01-27 RX ADMIN — Medication 50 MILLIGRAM(S): at 21:17

## 2017-01-27 RX ADMIN — Medication 2 MILLIGRAM(S): at 20:35

## 2017-01-27 RX ADMIN — Medication 1 PATCH: at 11:57

## 2017-01-27 RX ADMIN — Medication 2 MILLIGRAM(S): at 15:57

## 2017-01-27 RX ADMIN — LAMOTRIGINE 75 MILLIGRAM(S): 25 TABLET, ORALLY DISINTEGRATING ORAL at 21:18

## 2017-01-27 RX ADMIN — Medication 2 MILLIGRAM(S): at 12:40

## 2017-01-28 RX ORDER — GABAPENTIN 400 MG/1
300 CAPSULE ORAL ONCE
Qty: 0 | Refills: 0 | Status: COMPLETED | OUTPATIENT
Start: 2017-01-28 | End: 2017-01-28

## 2017-01-28 RX ORDER — DIPHENHYDRAMINE HCL 50 MG
50 CAPSULE ORAL ONCE
Qty: 0 | Refills: 0 | Status: COMPLETED | OUTPATIENT
Start: 2017-01-28 | End: 2017-01-28

## 2017-01-28 RX ADMIN — Medication 2 MILLIGRAM(S): at 12:34

## 2017-01-28 RX ADMIN — Medication 1 PATCH: at 09:54

## 2017-01-28 RX ADMIN — RISPERIDONE 12.5 MILLIGRAM(S): 4 TABLET ORAL at 12:38

## 2017-01-28 RX ADMIN — LAMOTRIGINE 75 MILLIGRAM(S): 25 TABLET, ORALLY DISINTEGRATING ORAL at 21:54

## 2017-01-28 RX ADMIN — GABAPENTIN 300 MILLIGRAM(S): 400 CAPSULE ORAL at 01:58

## 2017-01-28 RX ADMIN — Medication 50 MILLIGRAM(S): at 21:54

## 2017-01-28 RX ADMIN — Medication 2 MILLIGRAM(S): at 09:54

## 2017-01-28 RX ADMIN — Medication 1 PATCH: at 09:53

## 2017-01-29 RX ADMIN — Medication 1 PATCH: at 10:10

## 2017-01-29 RX ADMIN — Medication 2 MILLIGRAM(S): at 14:52

## 2017-01-29 RX ADMIN — Medication 1 PATCH: at 10:09

## 2017-01-29 RX ADMIN — Medication 2 MILLIGRAM(S): at 17:44

## 2017-01-29 RX ADMIN — Medication 50 MILLIGRAM(S): at 21:39

## 2017-01-29 RX ADMIN — Medication 50 MILLIGRAM(S): at 00:02

## 2017-01-29 RX ADMIN — Medication 2 MILLIGRAM(S): at 21:39

## 2017-01-29 RX ADMIN — LAMOTRIGINE 75 MILLIGRAM(S): 25 TABLET, ORALLY DISINTEGRATING ORAL at 21:39

## 2017-01-30 PROCEDURE — 99232 SBSQ HOSP IP/OBS MODERATE 35: CPT

## 2017-01-30 RX ADMIN — Medication 2 MILLIGRAM(S): at 21:51

## 2017-01-30 RX ADMIN — Medication 1 PATCH: at 11:37

## 2017-01-30 RX ADMIN — Medication 1 PATCH: at 11:39

## 2017-01-30 RX ADMIN — Medication 600 MILLIGRAM(S): at 22:59

## 2017-01-30 RX ADMIN — Medication 2 MILLIGRAM(S): at 15:58

## 2017-01-30 RX ADMIN — LAMOTRIGINE 75 MILLIGRAM(S): 25 TABLET, ORALLY DISINTEGRATING ORAL at 21:50

## 2017-01-30 RX ADMIN — Medication 2 MILLIGRAM(S): at 18:27

## 2017-01-30 RX ADMIN — Medication 600 MILLIGRAM(S): at 21:51

## 2017-01-30 RX ADMIN — Medication 2 MILLIGRAM(S): at 11:38

## 2017-01-30 RX ADMIN — Medication 50 MILLIGRAM(S): at 21:50

## 2017-01-31 PROCEDURE — 99232 SBSQ HOSP IP/OBS MODERATE 35: CPT

## 2017-01-31 RX ADMIN — Medication 2 MILLIGRAM(S): at 16:21

## 2017-01-31 RX ADMIN — Medication 50 MILLIGRAM(S): at 21:58

## 2017-01-31 RX ADMIN — Medication 2 MILLIGRAM(S): at 10:04

## 2017-01-31 RX ADMIN — Medication 1 PATCH: at 10:03

## 2017-01-31 RX ADMIN — Medication 600 MILLIGRAM(S): at 18:17

## 2017-01-31 RX ADMIN — LAMOTRIGINE 75 MILLIGRAM(S): 25 TABLET, ORALLY DISINTEGRATING ORAL at 21:58

## 2017-01-31 RX ADMIN — Medication 600 MILLIGRAM(S): at 18:15

## 2017-01-31 RX ADMIN — Medication 1 PATCH: at 10:04

## 2017-01-31 RX ADMIN — Medication 2 MILLIGRAM(S): at 12:24

## 2017-01-31 RX ADMIN — Medication 2 MILLIGRAM(S): at 18:15

## 2017-02-01 PROCEDURE — 99232 SBSQ HOSP IP/OBS MODERATE 35: CPT

## 2017-02-01 RX ORDER — QUETIAPINE FUMARATE 200 MG/1
50 TABLET, FILM COATED ORAL ONCE
Qty: 0 | Refills: 0 | Status: COMPLETED | OUTPATIENT
Start: 2017-02-01 | End: 2017-02-01

## 2017-02-01 RX ADMIN — LAMOTRIGINE 75 MILLIGRAM(S): 25 TABLET, ORALLY DISINTEGRATING ORAL at 21:39

## 2017-02-01 RX ADMIN — QUETIAPINE FUMARATE 50 MILLIGRAM(S): 200 TABLET, FILM COATED ORAL at 23:59

## 2017-02-01 RX ADMIN — Medication 1 PATCH: at 10:07

## 2017-02-01 RX ADMIN — Medication 1 PATCH: at 10:00

## 2017-02-01 RX ADMIN — Medication 600 MILLIGRAM(S): at 21:39

## 2017-02-01 RX ADMIN — Medication 600 MILLIGRAM(S): at 22:55

## 2017-02-01 RX ADMIN — Medication 2 MILLIGRAM(S): at 10:08

## 2017-02-01 RX ADMIN — Medication 50 MILLIGRAM(S): at 21:39

## 2017-02-01 RX ADMIN — Medication 600 MILLIGRAM(S): at 11:39

## 2017-02-01 RX ADMIN — Medication 600 MILLIGRAM(S): at 10:08

## 2017-02-02 PROCEDURE — 99232 SBSQ HOSP IP/OBS MODERATE 35: CPT

## 2017-02-02 RX ORDER — TRAZODONE HCL 50 MG
100 TABLET ORAL AT BEDTIME
Qty: 0 | Refills: 0 | Status: DISCONTINUED | OUTPATIENT
Start: 2017-02-02 | End: 2017-02-10

## 2017-02-02 RX ADMIN — Medication 600 MILLIGRAM(S): at 21:00

## 2017-02-02 RX ADMIN — Medication 1 PATCH: at 10:40

## 2017-02-02 RX ADMIN — Medication 50 MILLIGRAM(S): at 21:49

## 2017-02-02 RX ADMIN — LAMOTRIGINE 75 MILLIGRAM(S): 25 TABLET, ORALLY DISINTEGRATING ORAL at 21:48

## 2017-02-02 RX ADMIN — Medication 100 MILLIGRAM(S): at 21:48

## 2017-02-03 PROCEDURE — 99232 SBSQ HOSP IP/OBS MODERATE 35: CPT

## 2017-02-03 RX ORDER — RISPERIDONE 4 MG/1
37.5 TABLET ORAL EVERY 2 WEEKS
Qty: 0 | Refills: 0 | Status: DISCONTINUED | OUTPATIENT
Start: 2017-02-08 | End: 2017-02-10

## 2017-02-03 RX ADMIN — Medication 1 PATCH: at 12:41

## 2017-02-03 RX ADMIN — Medication 50 MILLIGRAM(S): at 00:05

## 2017-02-03 RX ADMIN — Medication 2 MILLIGRAM(S): at 14:27

## 2017-02-03 RX ADMIN — Medication 2 MILLIGRAM(S): at 10:43

## 2017-02-03 RX ADMIN — Medication 50 MILLIGRAM(S): at 22:24

## 2017-02-03 RX ADMIN — Medication 100 MILLIGRAM(S): at 22:24

## 2017-02-03 RX ADMIN — Medication 2 MILLIGRAM(S): at 12:48

## 2017-02-03 RX ADMIN — Medication 1 PATCH: at 10:43

## 2017-02-03 RX ADMIN — LAMOTRIGINE 75 MILLIGRAM(S): 25 TABLET, ORALLY DISINTEGRATING ORAL at 22:23

## 2017-02-04 RX ADMIN — Medication 1 PATCH: at 10:16

## 2017-02-04 RX ADMIN — Medication 2 MILLIGRAM(S): at 16:00

## 2017-02-04 RX ADMIN — Medication 600 MILLIGRAM(S): at 19:32

## 2017-02-04 RX ADMIN — Medication 2 MILLIGRAM(S): at 21:34

## 2017-02-04 RX ADMIN — Medication 100 MILLIGRAM(S): at 21:34

## 2017-02-04 RX ADMIN — Medication 600 MILLIGRAM(S): at 17:51

## 2017-02-04 RX ADMIN — LAMOTRIGINE 75 MILLIGRAM(S): 25 TABLET, ORALLY DISINTEGRATING ORAL at 21:34

## 2017-02-04 RX ADMIN — Medication 2 MILLIGRAM(S): at 10:16

## 2017-02-05 RX ADMIN — Medication 1 PATCH: at 10:46

## 2017-02-05 RX ADMIN — Medication 600 MILLIGRAM(S): at 19:01

## 2017-02-05 RX ADMIN — Medication 50 MILLIGRAM(S): at 00:25

## 2017-02-05 RX ADMIN — Medication 2 MILLIGRAM(S): at 10:46

## 2017-02-05 RX ADMIN — Medication 600 MILLIGRAM(S): at 10:46

## 2017-02-05 RX ADMIN — Medication 600 MILLIGRAM(S): at 20:00

## 2017-02-05 RX ADMIN — Medication 1 PATCH: at 10:48

## 2017-02-05 RX ADMIN — Medication 100 MILLIGRAM(S): at 21:49

## 2017-02-05 RX ADMIN — Medication 50 MILLIGRAM(S): at 21:49

## 2017-02-05 RX ADMIN — LAMOTRIGINE 75 MILLIGRAM(S): 25 TABLET, ORALLY DISINTEGRATING ORAL at 21:49

## 2017-02-05 RX ADMIN — Medication 2 MILLIGRAM(S): at 16:18

## 2017-02-05 RX ADMIN — Medication 600 MILLIGRAM(S): at 16:18

## 2017-02-06 PROCEDURE — 99232 SBSQ HOSP IP/OBS MODERATE 35: CPT

## 2017-02-06 RX ORDER — NALTREXONE HYDROCHLORIDE 50 MG/1
50 TABLET, FILM COATED ORAL DAILY
Qty: 0 | Refills: 0 | Status: DISCONTINUED | OUTPATIENT
Start: 2017-02-07 | End: 2017-02-10

## 2017-02-06 RX ORDER — NALTREXONE HYDROCHLORIDE 50 MG/1
25 TABLET, FILM COATED ORAL ONCE
Qty: 0 | Refills: 0 | Status: COMPLETED | OUTPATIENT
Start: 2017-02-06 | End: 2017-02-06

## 2017-02-06 RX ADMIN — Medication 2 MILLIGRAM(S): at 10:45

## 2017-02-06 RX ADMIN — Medication 100 MILLIGRAM(S): at 22:02

## 2017-02-06 RX ADMIN — LAMOTRIGINE 75 MILLIGRAM(S): 25 TABLET, ORALLY DISINTEGRATING ORAL at 22:02

## 2017-02-06 RX ADMIN — Medication 600 MILLIGRAM(S): at 19:36

## 2017-02-06 RX ADMIN — Medication 600 MILLIGRAM(S): at 12:34

## 2017-02-06 RX ADMIN — Medication 1 PATCH: at 10:46

## 2017-02-06 RX ADMIN — Medication 600 MILLIGRAM(S): at 19:26

## 2017-02-06 RX ADMIN — Medication 50 MILLIGRAM(S): at 22:01

## 2017-02-06 RX ADMIN — Medication 1 PATCH: at 10:45

## 2017-02-06 RX ADMIN — Medication 600 MILLIGRAM(S): at 04:46

## 2017-02-06 RX ADMIN — NALTREXONE HYDROCHLORIDE 25 MILLIGRAM(S): 50 TABLET, FILM COATED ORAL at 15:15

## 2017-02-06 RX ADMIN — Medication 50 MILLIGRAM(S): at 23:58

## 2017-02-07 PROCEDURE — 99232 SBSQ HOSP IP/OBS MODERATE 35: CPT

## 2017-02-07 RX ORDER — LAMOTRIGINE 25 MG/1
100 TABLET, ORALLY DISINTEGRATING ORAL AT BEDTIME
Qty: 0 | Refills: 0 | Status: DISCONTINUED | OUTPATIENT
Start: 2017-02-07 | End: 2017-02-10

## 2017-02-07 RX ADMIN — Medication 100 MILLIGRAM(S): at 21:22

## 2017-02-07 RX ADMIN — Medication 600 MILLIGRAM(S): at 01:35

## 2017-02-07 RX ADMIN — Medication 600 MILLIGRAM(S): at 23:01

## 2017-02-07 RX ADMIN — LAMOTRIGINE 100 MILLIGRAM(S): 25 TABLET, ORALLY DISINTEGRATING ORAL at 21:22

## 2017-02-07 RX ADMIN — Medication 600 MILLIGRAM(S): at 16:15

## 2017-02-07 RX ADMIN — Medication 600 MILLIGRAM(S): at 17:33

## 2017-02-07 RX ADMIN — Medication 600 MILLIGRAM(S): at 03:00

## 2017-02-07 RX ADMIN — Medication 1 PATCH: at 11:00

## 2017-02-07 RX ADMIN — NALTREXONE HYDROCHLORIDE 50 MILLIGRAM(S): 50 TABLET, FILM COATED ORAL at 11:00

## 2017-02-07 RX ADMIN — Medication 1 PATCH: at 10:51

## 2017-02-07 RX ADMIN — Medication 50 MILLIGRAM(S): at 21:22

## 2017-02-07 RX ADMIN — Medication 600 MILLIGRAM(S): at 11:00

## 2017-02-07 RX ADMIN — Medication 600 MILLIGRAM(S): at 17:01

## 2017-02-08 PROCEDURE — 99232 SBSQ HOSP IP/OBS MODERATE 35: CPT

## 2017-02-08 RX ADMIN — Medication 600 MILLIGRAM(S): at 14:28

## 2017-02-08 RX ADMIN — Medication 600 MILLIGRAM(S): at 14:34

## 2017-02-08 RX ADMIN — Medication 600 MILLIGRAM(S): at 21:56

## 2017-02-08 RX ADMIN — NALTREXONE HYDROCHLORIDE 50 MILLIGRAM(S): 50 TABLET, FILM COATED ORAL at 14:29

## 2017-02-08 RX ADMIN — Medication 600 MILLIGRAM(S): at 22:55

## 2017-02-08 RX ADMIN — Medication 1 PATCH: at 14:40

## 2017-02-08 RX ADMIN — RISPERIDONE 37.5 MILLIGRAM(S): 4 TABLET ORAL at 14:37

## 2017-02-08 RX ADMIN — Medication 600 MILLIGRAM(S): at 00:36

## 2017-02-08 RX ADMIN — Medication 50 MILLIGRAM(S): at 21:57

## 2017-02-08 RX ADMIN — Medication 1 PATCH: at 11:22

## 2017-02-08 RX ADMIN — LAMOTRIGINE 100 MILLIGRAM(S): 25 TABLET, ORALLY DISINTEGRATING ORAL at 21:56

## 2017-02-08 RX ADMIN — Medication 100 MILLIGRAM(S): at 21:56

## 2017-02-08 RX ADMIN — Medication 600 MILLIGRAM(S): at 07:43

## 2017-02-09 VITALS
HEART RATE: 81 BPM | DIASTOLIC BLOOD PRESSURE: 78 MMHG | SYSTOLIC BLOOD PRESSURE: 120 MMHG | RESPIRATION RATE: 18 BRPM | TEMPERATURE: 98 F

## 2017-02-09 LAB
ALBUMIN SERPL ELPH-MCNC: 3.4 G/DL — SIGNIFICANT CHANGE UP (ref 3.4–5)
ALP SERPL-CCNC: 63 U/L — SIGNIFICANT CHANGE UP (ref 40–120)
ALT FLD-CCNC: 29 U/L — SIGNIFICANT CHANGE UP (ref 12–42)
ANION GAP SERPL CALC-SCNC: 5 MMOL/L — LOW (ref 9–16)
AST SERPL-CCNC: 12 U/L — LOW (ref 15–37)
BASOPHILS NFR BLD AUTO: 0.4 % — SIGNIFICANT CHANGE UP (ref 0–2)
BILIRUB SERPL-MCNC: 0.3 MG/DL — SIGNIFICANT CHANGE UP (ref 0.2–1.2)
BUN SERPL-MCNC: 14 MG/DL — SIGNIFICANT CHANGE UP (ref 7–23)
CALCIUM SERPL-MCNC: 9.1 MG/DL — SIGNIFICANT CHANGE UP (ref 8.5–10.5)
CHLORIDE SERPL-SCNC: 105 MMOL/L — SIGNIFICANT CHANGE UP (ref 96–108)
CO2 SERPL-SCNC: 28 MMOL/L — SIGNIFICANT CHANGE UP (ref 22–31)
CREAT SERPL-MCNC: 0.81 MG/DL — SIGNIFICANT CHANGE UP (ref 0.5–1.3)
EOSINOPHIL NFR BLD AUTO: 3.1 % — SIGNIFICANT CHANGE UP (ref 0–6)
GLUCOSE SERPL-MCNC: 104 MG/DL — HIGH (ref 70–99)
HCT VFR BLD CALC: 39.7 % — SIGNIFICANT CHANGE UP (ref 39–50)
HGB BLD-MCNC: 13.5 G/DL — SIGNIFICANT CHANGE UP (ref 13–17)
LYMPHOCYTES # BLD AUTO: 31 % — SIGNIFICANT CHANGE UP (ref 13–44)
MAGNESIUM SERPL-MCNC: 1.9 MG/DL — SIGNIFICANT CHANGE UP (ref 1.6–2.4)
MCHC RBC-ENTMCNC: 29.9 PG — SIGNIFICANT CHANGE UP (ref 27–34)
MCHC RBC-ENTMCNC: 34 G/DL — SIGNIFICANT CHANGE UP (ref 32–36)
MCV RBC AUTO: 88 FL — SIGNIFICANT CHANGE UP (ref 80–100)
MONOCYTES NFR BLD AUTO: 8.6 % — SIGNIFICANT CHANGE UP (ref 2–14)
NEUTROPHILS NFR BLD AUTO: 56.9 % — SIGNIFICANT CHANGE UP (ref 43–77)
PLATELET # BLD AUTO: 305 K/UL — SIGNIFICANT CHANGE UP (ref 150–400)
POTASSIUM SERPL-MCNC: 4 MMOL/L — SIGNIFICANT CHANGE UP (ref 3.5–5.3)
POTASSIUM SERPL-SCNC: 4 MMOL/L — SIGNIFICANT CHANGE UP (ref 3.5–5.3)
PROT SERPL-MCNC: 7.2 G/DL — SIGNIFICANT CHANGE UP (ref 6.4–8.2)
RBC # BLD: 4.51 M/UL — SIGNIFICANT CHANGE UP (ref 4.2–5.8)
RBC # FLD: 12.4 % — SIGNIFICANT CHANGE UP (ref 10.3–16.9)
SODIUM SERPL-SCNC: 138 MMOL/L — SIGNIFICANT CHANGE UP (ref 135–145)
WBC # BLD: 9.6 K/UL — SIGNIFICANT CHANGE UP (ref 3.8–10.5)
WBC # FLD AUTO: 9.6 K/UL — SIGNIFICANT CHANGE UP (ref 3.8–10.5)

## 2017-02-09 PROCEDURE — 99222 1ST HOSP IP/OBS MODERATE 55: CPT

## 2017-02-09 PROCEDURE — 99232 SBSQ HOSP IP/OBS MODERATE 35: CPT

## 2017-02-09 RX ORDER — NALTREXONE HYDROCHLORIDE 50 MG/1
380 TABLET, FILM COATED ORAL
Qty: 0 | Refills: 0 | Status: DISCONTINUED | OUTPATIENT
Start: 2017-02-10 | End: 2017-02-10

## 2017-02-09 RX ORDER — ACETAMINOPHEN 500 MG
650 TABLET ORAL ONCE
Qty: 0 | Refills: 0 | Status: COMPLETED | OUTPATIENT
Start: 2017-02-09 | End: 2017-02-09

## 2017-02-09 RX ORDER — CIPROFLOXACIN AND DEXAMETHASONE 3; 1 MG/ML; MG/ML
4 SUSPENSION/ DROPS AURICULAR (OTIC)
Qty: 0 | Refills: 0 | Status: DISCONTINUED | OUTPATIENT
Start: 2017-02-09 | End: 2017-02-09

## 2017-02-09 RX ORDER — CIPROFLOXACIN LACTATE 400MG/40ML
500 VIAL (ML) INTRAVENOUS EVERY 12 HOURS
Qty: 0 | Refills: 0 | Status: DISCONTINUED | OUTPATIENT
Start: 2017-02-09 | End: 2017-02-10

## 2017-02-09 RX ADMIN — Medication 600 MILLIGRAM(S): at 22:08

## 2017-02-09 RX ADMIN — Medication 600 MILLIGRAM(S): at 23:30

## 2017-02-09 RX ADMIN — Medication 600 MILLIGRAM(S): at 16:12

## 2017-02-09 RX ADMIN — Medication 600 MILLIGRAM(S): at 07:13

## 2017-02-09 RX ADMIN — LAMOTRIGINE 100 MILLIGRAM(S): 25 TABLET, ORALLY DISINTEGRATING ORAL at 22:07

## 2017-02-09 RX ADMIN — Medication 1 TABLET(S): at 15:53

## 2017-02-09 RX ADMIN — Medication 500 MILLIGRAM(S): at 22:08

## 2017-02-09 RX ADMIN — Medication 500 MILLIGRAM(S): at 15:53

## 2017-02-09 RX ADMIN — Medication 2 MILLIGRAM(S): at 22:08

## 2017-02-09 RX ADMIN — Medication 2 MILLIGRAM(S): at 10:31

## 2017-02-09 RX ADMIN — Medication 650 MILLIGRAM(S): at 18:32

## 2017-02-09 RX ADMIN — Medication 2 MILLIGRAM(S): at 18:32

## 2017-02-09 RX ADMIN — Medication 2 MILLIGRAM(S): at 07:13

## 2017-02-09 RX ADMIN — Medication 1 TABLET(S): at 22:07

## 2017-02-09 RX ADMIN — Medication 100 MILLIGRAM(S): at 22:07

## 2017-02-09 RX ADMIN — Medication 600 MILLIGRAM(S): at 15:53

## 2017-02-09 RX ADMIN — NALTREXONE HYDROCHLORIDE 50 MILLIGRAM(S): 50 TABLET, FILM COATED ORAL at 10:31

## 2017-02-09 RX ADMIN — Medication 2 MILLIGRAM(S): at 15:53

## 2017-02-09 RX ADMIN — Medication 1 PATCH: at 10:30

## 2017-02-10 LAB
GRAM STN FLD: SIGNIFICANT CHANGE UP
SPECIMEN SOURCE: SIGNIFICANT CHANGE UP

## 2017-02-10 PROCEDURE — 87075 CULTR BACTERIA EXCEPT BLOOD: CPT

## 2017-02-10 PROCEDURE — 71010: CPT

## 2017-02-10 PROCEDURE — 87070 CULTURE OTHR SPECIMN AEROBIC: CPT

## 2017-02-10 PROCEDURE — 80074 ACUTE HEPATITIS PANEL: CPT

## 2017-02-10 PROCEDURE — 70450 CT HEAD/BRAIN W/O DYE: CPT

## 2017-02-10 PROCEDURE — 83036 HEMOGLOBIN GLYCOSYLATED A1C: CPT

## 2017-02-10 PROCEDURE — 85025 COMPLETE CBC W/AUTO DIFF WBC: CPT

## 2017-02-10 PROCEDURE — 80307 DRUG TEST PRSMV CHEM ANLYZR: CPT

## 2017-02-10 PROCEDURE — 85730 THROMBOPLASTIN TIME PARTIAL: CPT

## 2017-02-10 PROCEDURE — 83605 ASSAY OF LACTIC ACID: CPT

## 2017-02-10 PROCEDURE — 83735 ASSAY OF MAGNESIUM: CPT

## 2017-02-10 PROCEDURE — 36415 COLL VENOUS BLD VENIPUNCTURE: CPT

## 2017-02-10 PROCEDURE — 99285 EMERGENCY DEPT VISIT HI MDM: CPT | Mod: 25

## 2017-02-10 PROCEDURE — 71250 CT THORAX DX C-: CPT

## 2017-02-10 PROCEDURE — 87186 SC STD MICRODIL/AGAR DIL: CPT

## 2017-02-10 PROCEDURE — 82607 VITAMIN B-12: CPT

## 2017-02-10 PROCEDURE — 86780 TREPONEMA PALLIDUM: CPT

## 2017-02-10 PROCEDURE — 87040 BLOOD CULTURE FOR BACTERIA: CPT

## 2017-02-10 PROCEDURE — 82746 ASSAY OF FOLIC ACID SERUM: CPT

## 2017-02-10 PROCEDURE — 87205 SMEAR GRAM STAIN: CPT

## 2017-02-10 PROCEDURE — 85610 PROTHROMBIN TIME: CPT

## 2017-02-10 PROCEDURE — 80053 COMPREHEN METABOLIC PANEL: CPT

## 2017-02-10 PROCEDURE — 93005 ELECTROCARDIOGRAM TRACING: CPT

## 2017-02-10 PROCEDURE — 82550 ASSAY OF CK (CPK): CPT

## 2017-02-10 PROCEDURE — 82553 CREATINE MB FRACTION: CPT

## 2017-02-10 PROCEDURE — 96374 THER/PROPH/DIAG INJ IV PUSH: CPT

## 2017-02-10 PROCEDURE — 81003 URINALYSIS AUTO W/O SCOPE: CPT

## 2017-02-10 PROCEDURE — 81001 URINALYSIS AUTO W/SCOPE: CPT

## 2017-02-10 PROCEDURE — 83690 ASSAY OF LIPASE: CPT

## 2017-02-10 PROCEDURE — 87086 URINE CULTURE/COLONY COUNT: CPT

## 2017-02-10 PROCEDURE — 99238 HOSP IP/OBS DSCHRG MGMT 30/<: CPT

## 2017-02-10 PROCEDURE — 84484 ASSAY OF TROPONIN QUANT: CPT

## 2017-02-10 RX ORDER — NICOTINE POLACRILEX 2 MG
1 GUM BUCCAL
Qty: 30 | Refills: 0 | OUTPATIENT
Start: 2017-02-10 | End: 2017-03-12

## 2017-02-10 RX ORDER — NALTREXONE HYDROCHLORIDE 50 MG/1
1 TABLET, FILM COATED ORAL
Qty: 14 | Refills: 0 | OUTPATIENT
Start: 2017-02-10 | End: 2017-02-24

## 2017-02-10 RX ORDER — LAMOTRIGINE 25 MG/1
1 TABLET, ORALLY DISINTEGRATING ORAL
Qty: 12 | Refills: 0 | OUTPATIENT
Start: 2017-02-10 | End: 2017-02-16

## 2017-02-10 RX ORDER — NALTREXONE HYDROCHLORIDE 50 MG/1
380 TABLET, FILM COATED ORAL
Qty: 0 | Refills: 0 | Status: DISCONTINUED | OUTPATIENT
Start: 2017-02-10 | End: 2017-02-10

## 2017-02-10 RX ORDER — AMOXICILLIN 250 MG/5ML
1 SUSPENSION, RECONSTITUTED, ORAL (ML) ORAL
Qty: 12 | Refills: 0 | OUTPATIENT
Start: 2017-02-10 | End: 2017-02-16

## 2017-02-10 RX ORDER — TRAZODONE HCL 50 MG
1 TABLET ORAL
Qty: 14 | Refills: 0 | OUTPATIENT
Start: 2017-02-10 | End: 2017-02-24

## 2017-02-10 RX ORDER — MOXIFLOXACIN HYDROCHLORIDE TABLETS, 400 MG 400 MG/1
1 TABLET, FILM COATED ORAL
Qty: 12 | Refills: 0 | OUTPATIENT
Start: 2017-02-10 | End: 2017-02-16

## 2017-02-10 RX ORDER — NICOTINE POLACRILEX 2 MG
1 GUM BUCCAL
Qty: 100 | Refills: 0 | OUTPATIENT
Start: 2017-02-10 | End: 2017-03-12

## 2017-02-11 LAB
-  CEFAZOLIN: SIGNIFICANT CHANGE UP
-  CLINDAMYCIN: SIGNIFICANT CHANGE UP
-  ERYTHROMYCIN: SIGNIFICANT CHANGE UP
-  LINEZOLID: SIGNIFICANT CHANGE UP
-  OXACILLIN: SIGNIFICANT CHANGE UP
-  PENICILLIN: SIGNIFICANT CHANGE UP
-  RIFAMPIN: SIGNIFICANT CHANGE UP
-  TRIMETHOPRIM/SULFAMETHOXAZOLE: SIGNIFICANT CHANGE UP
-  VANCOMYCIN: SIGNIFICANT CHANGE UP
CULTURE RESULTS: SIGNIFICANT CHANGE UP
METHOD TYPE: SIGNIFICANT CHANGE UP
ORGANISM # SPEC MICROSCOPIC CNT: SIGNIFICANT CHANGE UP
ORGANISM # SPEC MICROSCOPIC CNT: SIGNIFICANT CHANGE UP
SPECIMEN SOURCE: SIGNIFICANT CHANGE UP

## 2017-02-13 ENCOUNTER — APPOINTMENT (OUTPATIENT)
Dept: OTOLARYNGOLOGY | Facility: CLINIC | Age: 32
End: 2017-02-13

## 2017-02-13 VITALS
SYSTOLIC BLOOD PRESSURE: 112 MMHG | WEIGHT: 170 LBS | HEIGHT: 62 IN | BODY MASS INDEX: 31.28 KG/M2 | HEART RATE: 108 BPM | DIASTOLIC BLOOD PRESSURE: 72 MMHG

## 2017-02-13 DIAGNOSIS — F32.9 MAJOR DEPRESSIVE DISORDER, SINGLE EPISODE, UNSPECIFIED: ICD-10-CM

## 2017-02-13 DIAGNOSIS — H60.392 OTHER INFECTIVE OTITIS EXTERNA, LEFT EAR: ICD-10-CM

## 2017-02-13 DIAGNOSIS — F41.9 ANXIETY DISORDER, UNSPECIFIED: ICD-10-CM

## 2017-02-13 DIAGNOSIS — H60.02 ABSCESS OF LEFT EXTERNAL EAR: ICD-10-CM

## 2017-02-13 DIAGNOSIS — Z78.9 OTHER SPECIFIED HEALTH STATUS: ICD-10-CM

## 2017-02-13 DIAGNOSIS — F17.200 NICOTINE DEPENDENCE, UNSPECIFIED, UNCOMPLICATED: ICD-10-CM

## 2017-02-13 DIAGNOSIS — H91.93 UNSPECIFIED HEARING LOSS, BILATERAL: ICD-10-CM

## 2017-02-13 DIAGNOSIS — H61.21 IMPACTED CERUMEN, RIGHT EAR: ICD-10-CM

## 2017-02-13 PROBLEM — Z00.00 ENCOUNTER FOR PREVENTIVE HEALTH EXAMINATION: Status: ACTIVE | Noted: 2017-02-13

## 2017-02-13 PROBLEM — F19.10 OTHER PSYCHOACTIVE SUBSTANCE ABUSE, UNCOMPLICATED: Chronic | Status: ACTIVE | Noted: 2017-01-12

## 2017-02-13 RX ORDER — AMOXICILLIN AND CLAVULANATE POTASSIUM 875; 125 MG/1; 1/1
875-125 TABLET, FILM COATED ORAL
Refills: 0 | Status: ACTIVE | COMMUNITY
Start: 2017-02-13

## 2017-02-13 RX ORDER — LAMOTRIGINE 100 MG/1
100 TABLET ORAL
Refills: 0 | Status: ACTIVE | COMMUNITY
Start: 2017-02-13

## 2017-02-13 RX ORDER — RISPERIDONE 1 MG/1
1 TABLET ORAL
Refills: 0 | Status: ACTIVE | COMMUNITY
Start: 2017-02-13

## 2017-02-13 RX ORDER — CIPROFLOXACIN AND DEXAMETHASONE 3; 1 MG/ML; MG/ML
0.3-0.1 SUSPENSION/ DROPS AURICULAR (OTIC) TWICE DAILY
Qty: 1 | Refills: 1 | Status: ACTIVE | COMMUNITY
Start: 2017-02-13 | End: 1900-01-01

## 2017-02-13 RX ORDER — MUPIROCIN 20 MG/G
2 OINTMENT TOPICAL
Qty: 2 | Refills: 0 | Status: ACTIVE | COMMUNITY
Start: 2017-02-13 | End: 1900-01-01

## 2017-02-13 RX ORDER — CIPROFLOXACIN HYDROCHLORIDE 500 MG/1
500 TABLET, FILM COATED ORAL
Refills: 0 | Status: ACTIVE | COMMUNITY
Start: 2017-02-13

## 2017-02-18 DIAGNOSIS — F12.10 CANNABIS ABUSE, UNCOMPLICATED: ICD-10-CM

## 2017-02-18 DIAGNOSIS — F14.90 COCAINE USE, UNSPECIFIED, UNCOMPLICATED: ICD-10-CM

## 2017-02-18 DIAGNOSIS — H66.93 OTITIS MEDIA, UNSPECIFIED, BILATERAL: ICD-10-CM

## 2017-02-18 DIAGNOSIS — F98.8 OTHER SPECIFIED BEHAVIORAL AND EMOTIONAL DISORDERS WITH ONSET USUALLY OCCURRING IN CHILDHOOD AND ADOLESCENCE: ICD-10-CM

## 2017-02-18 DIAGNOSIS — F10.10 ALCOHOL ABUSE, UNCOMPLICATED: ICD-10-CM

## 2017-02-18 DIAGNOSIS — Z91.19 PATIENT'S NONCOMPLIANCE WITH OTHER MEDICAL TREATMENT AND REGIMEN: ICD-10-CM

## 2017-02-18 DIAGNOSIS — F31.10 BIPOLAR DISORDER, CURRENT EPISODE MANIC WITHOUT PSYCHOTIC FEATURES, UNSPECIFIED: ICD-10-CM

## 2017-02-18 DIAGNOSIS — I10 ESSENTIAL (PRIMARY) HYPERTENSION: ICD-10-CM

## 2017-02-18 DIAGNOSIS — F17.200 NICOTINE DEPENDENCE, UNSPECIFIED, UNCOMPLICATED: ICD-10-CM

## 2017-02-23 ENCOUNTER — APPOINTMENT (OUTPATIENT)
Dept: OTOLARYNGOLOGY | Facility: CLINIC | Age: 32
End: 2017-02-23

## 2017-07-22 ENCOUNTER — EMERGENCY (EMERGENCY)
Facility: HOSPITAL | Age: 32
LOS: 1 days | Discharge: HOME | End: 2017-07-22
Attending: EMERGENCY MEDICINE | Admitting: EMERGENCY MEDICINE
Payer: COMMERCIAL

## 2017-07-22 VITALS
WEIGHT: 160.06 LBS | RESPIRATION RATE: 20 BRPM | HEART RATE: 106 BPM | SYSTOLIC BLOOD PRESSURE: 122 MMHG | TEMPERATURE: 99 F | DIASTOLIC BLOOD PRESSURE: 73 MMHG | HEIGHT: 62 IN | OXYGEN SATURATION: 96 %

## 2017-07-22 VITALS
RESPIRATION RATE: 20 BRPM | TEMPERATURE: 98 F | DIASTOLIC BLOOD PRESSURE: 69 MMHG | HEART RATE: 106 BPM | OXYGEN SATURATION: 98 % | SYSTOLIC BLOOD PRESSURE: 110 MMHG

## 2017-07-22 DIAGNOSIS — R45.851 SUICIDAL IDEATIONS: ICD-10-CM

## 2017-07-22 DIAGNOSIS — F14.10 COCAINE ABUSE, UNCOMPLICATED: ICD-10-CM

## 2017-07-22 DIAGNOSIS — Z76.5 MALINGERER [CONSCIOUS SIMULATION]: ICD-10-CM

## 2017-07-22 DIAGNOSIS — F10.99 ALCOHOL USE, UNSPECIFIED WITH UNSPECIFIED ALCOHOL-INDUCED DISORDER: ICD-10-CM

## 2017-07-22 DIAGNOSIS — G40.909 EPILEPSY, UNSPECIFIED, NOT INTRACTABLE, WITHOUT STATUS EPILEPTICUS: ICD-10-CM

## 2017-07-22 DIAGNOSIS — I10 ESSENTIAL (PRIMARY) HYPERTENSION: ICD-10-CM

## 2017-07-22 DIAGNOSIS — Z79.899 OTHER LONG TERM (CURRENT) DRUG THERAPY: ICD-10-CM

## 2017-07-22 LAB
ALBUMIN SERPL ELPH-MCNC: 3.8 G/DL — SIGNIFICANT CHANGE UP (ref 3.3–5)
ALP SERPL-CCNC: 58 U/L — SIGNIFICANT CHANGE UP (ref 40–120)
ALT FLD-CCNC: 37 U/L — SIGNIFICANT CHANGE UP (ref 10–45)
ANION GAP SERPL CALC-SCNC: 16 MMOL/L — SIGNIFICANT CHANGE UP (ref 5–17)
APAP SERPL-MCNC: <15 UG/ML — SIGNIFICANT CHANGE UP (ref 10–30)
APPEARANCE UR: CLEAR — SIGNIFICANT CHANGE UP
AST SERPL-CCNC: 18 U/L — SIGNIFICANT CHANGE UP (ref 10–40)
BASOPHILS NFR BLD AUTO: 0.3 % — SIGNIFICANT CHANGE UP (ref 0–2)
BILIRUB SERPL-MCNC: 0.4 MG/DL — SIGNIFICANT CHANGE UP (ref 0.2–1.2)
BILIRUB UR-MCNC: NEGATIVE — SIGNIFICANT CHANGE UP
BUN SERPL-MCNC: 17 MG/DL — SIGNIFICANT CHANGE UP (ref 7–23)
CALCIUM SERPL-MCNC: 9.1 MG/DL — SIGNIFICANT CHANGE UP (ref 8.4–10.5)
CHLORIDE SERPL-SCNC: 102 MMOL/L — SIGNIFICANT CHANGE UP (ref 96–108)
CO2 SERPL-SCNC: 23 MMOL/L — SIGNIFICANT CHANGE UP (ref 22–31)
COLOR SPEC: YELLOW — SIGNIFICANT CHANGE UP
CREAT SERPL-MCNC: 0.9 MG/DL — SIGNIFICANT CHANGE UP (ref 0.5–1.3)
DIFF PNL FLD: NEGATIVE — SIGNIFICANT CHANGE UP
EOSINOPHIL NFR BLD AUTO: 5.3 % — SIGNIFICANT CHANGE UP (ref 0–6)
ETHANOL SERPL-MCNC: <10 MG/DL — SIGNIFICANT CHANGE UP (ref 0–10)
GLUCOSE SERPL-MCNC: 105 MG/DL — HIGH (ref 70–99)
GLUCOSE UR QL: NEGATIVE — SIGNIFICANT CHANGE UP
HCT VFR BLD CALC: 38.5 % — LOW (ref 39–50)
HGB BLD-MCNC: 12.9 G/DL — LOW (ref 13–17)
KETONES UR-MCNC: (no result) MG/DL
LEUKOCYTE ESTERASE UR-ACNC: NEGATIVE — SIGNIFICANT CHANGE UP
LYMPHOCYTES # BLD AUTO: 20.4 % — SIGNIFICANT CHANGE UP (ref 13–44)
MCHC RBC-ENTMCNC: 29.9 PG — SIGNIFICANT CHANGE UP (ref 27–34)
MCHC RBC-ENTMCNC: 33.5 G/DL — SIGNIFICANT CHANGE UP (ref 32–36)
MCV RBC AUTO: 89.3 FL — SIGNIFICANT CHANGE UP (ref 80–100)
MONOCYTES NFR BLD AUTO: 13.6 % — SIGNIFICANT CHANGE UP (ref 2–14)
NEUTROPHILS NFR BLD AUTO: 60.4 % — SIGNIFICANT CHANGE UP (ref 43–77)
NITRITE UR-MCNC: NEGATIVE — SIGNIFICANT CHANGE UP
PH UR: 6.5 — SIGNIFICANT CHANGE UP (ref 5–8)
PLATELET # BLD AUTO: 374 K/UL — SIGNIFICANT CHANGE UP (ref 150–400)
POTASSIUM SERPL-MCNC: 3.6 MMOL/L — SIGNIFICANT CHANGE UP (ref 3.5–5.3)
POTASSIUM SERPL-SCNC: 3.6 MMOL/L — SIGNIFICANT CHANGE UP (ref 3.5–5.3)
PROT SERPL-MCNC: 6.8 G/DL — SIGNIFICANT CHANGE UP (ref 6–8.3)
PROT UR-MCNC: NEGATIVE MG/DL — SIGNIFICANT CHANGE UP
RBC # BLD: 4.31 M/UL — SIGNIFICANT CHANGE UP (ref 4.2–5.8)
RBC # FLD: 14.1 % — SIGNIFICANT CHANGE UP (ref 10.3–16.9)
SALICYLATES SERPL-MCNC: <0.3 MG/DL — LOW (ref 2.8–20)
SODIUM SERPL-SCNC: 141 MMOL/L — SIGNIFICANT CHANGE UP (ref 135–145)
SP GR SPEC: 1.02 — SIGNIFICANT CHANGE UP (ref 1–1.03)
UROBILINOGEN FLD QL: 1 E.U./DL — SIGNIFICANT CHANGE UP
WBC # BLD: 13.4 K/UL — HIGH (ref 3.8–10.5)
WBC # FLD AUTO: 13.4 K/UL — HIGH (ref 3.8–10.5)

## 2017-07-22 PROCEDURE — 85025 COMPLETE CBC W/AUTO DIFF WBC: CPT

## 2017-07-22 PROCEDURE — 80156 ASSAY CARBAMAZEPINE TOTAL: CPT

## 2017-07-22 PROCEDURE — 93010 ELECTROCARDIOGRAM REPORT: CPT

## 2017-07-22 PROCEDURE — 99284 EMERGENCY DEPT VISIT MOD MDM: CPT | Mod: 25

## 2017-07-22 PROCEDURE — 80307 DRUG TEST PRSMV CHEM ANLYZR: CPT

## 2017-07-22 PROCEDURE — 80186 ASSAY OF PHENYTOIN FREE: CPT

## 2017-07-22 PROCEDURE — 81003 URINALYSIS AUTO W/O SCOPE: CPT

## 2017-07-22 PROCEDURE — 99285 EMERGENCY DEPT VISIT HI MDM: CPT | Mod: 25

## 2017-07-22 PROCEDURE — 93005 ELECTROCARDIOGRAM TRACING: CPT

## 2017-07-22 PROCEDURE — 80053 COMPREHEN METABOLIC PANEL: CPT

## 2017-07-22 RX ORDER — SODIUM CHLORIDE 9 MG/ML
1000 INJECTION INTRAMUSCULAR; INTRAVENOUS; SUBCUTANEOUS ONCE
Qty: 0 | Refills: 0 | Status: COMPLETED | OUTPATIENT
Start: 2017-07-22 | End: 2017-07-22

## 2017-07-22 RX ADMIN — SODIUM CHLORIDE 1000 MILLILITER(S): 9 INJECTION INTRAMUSCULAR; INTRAVENOUS; SUBCUTANEOUS at 17:31

## 2017-07-22 NOTE — ED BEHAVIORAL HEALTH ASSESSMENT NOTE - DETAILS
Reports past attempts - states over 1 year ago "took tylenol" but no medical treatment, reports "heroin overdose" and "trying to hang self" in Jan 2017 but no recent attempt Reports past history of DT/seizures (reports medical history of seizures) MD - Dr Limon made aware

## 2017-07-22 NOTE — ED BEHAVIORAL HEALTH ASSESSMENT NOTE - RISK ASSESSMENT
Low acute risk of harm to self or others  Low-mid chronic risk of harm to self; low chronic risk of harm to others

## 2017-07-22 NOTE — ED BEHAVIORAL HEALTH ASSESSMENT NOTE - HPI (INCLUDE ILLNESS QUALITY, SEVERITY, DURATION, TIMING, CONTEXT, MODIFYING FACTORS, ASSOCIATED SIGNS AND SYMPTOMS)
33yo undomiciled, unemployed  F with history of alcohol use disorder, cocaine use disorder, self reported history of mood disorder (bipolar?) who presented with alcohol intoxication, cocaine use yesterday, after discharged from [facility] ER for reported suicidality.      Patient was seen and evaluated. Patient was noted to have eaten his sandwich. Patient smiled when approached and writer introduced self as psychiatrist. Patient reported that for the past month, feeling "down" and "suicidal" because had been "kicked out [employer]" for continuing to drink. Patient reports that having sleep issues because "hard to sleep" on streets or shelters, states: "eats when I get money from begging but gets hungry", reports enough energy to keep walking around. He states that he's been having "suicidal thoughts" but that hasn't engaged in any self harming behavior because "I'm hoping [employer] takes me back." Patient takes desire to get help with his drinking and cocaine use. When asked about plans, he talks of non specific plans. When asked what he might do if he were discharged, "I'll probably start using cocaine and alcohol" but that he wants "help to stop." Patient asks about outpatient treatment program for both substance and mood - wants to know about walk in programs.    Collateral: Patient refused to provide contact information    Psyckes: Diagnoses: of stimulant use, opioid use, other psychostimulant use, cocaine use, alcohol use - along with unspecified depressive/bipolar/psychosis; no outpatient mental health services used; no medications dispensed; multiple psych ER visits without admissions; Last psych admission in Feb '17 (presented last than 1 week after discharge from [facility])

## 2017-07-22 NOTE — ED PROVIDER NOTE - PROGRESS NOTE DETAILS
pt seen and eval by psych  pt has long hx of going to multiple EDs for care-  no SI or HI  comfortable alert VSS will dc no seizure activity in Ed sleeping comfortably

## 2017-07-22 NOTE — ED BEHAVIORAL HEALTH ASSESSMENT NOTE - OTHER PAST PSYCHIATRIC HISTORY (INCLUDE DETAILS REGARDING ONSET, COURSE OF ILLNESS, INPATIENT/OUTPATIENT TREATMENT)
Reports prior psychiatric hospitalization; Last [facility] psych hospitalization in Jan 2017; reports no current psych meds - has been without since then; No current outpatient provider at this time

## 2017-07-22 NOTE — ED PROVIDER NOTE - CARE PLAN
Principal Discharge DX:	Polysubstance abuse  Secondary Diagnosis:	Malingering Principal Discharge DX:	Polysubstance abuse  Secondary Diagnosis:	Malingering  Secondary Diagnosis:	Seizures

## 2017-07-22 NOTE — ED BEHAVIORAL HEALTH ASSESSMENT NOTE - DESCRIPTION
Calm reports history of seizures  - family including parents and sister - no contact since 4 years ago due to substance use issues; reports in shelters; obtained associate degree

## 2017-07-22 NOTE — ED ADULT NURSE NOTE - OBJECTIVE STATEMENT
Pt w/ PMH of seizure disorder and hx of ETOH abuse and SI presents to ED today after recent d/c from [facility] c/o SI.  Pt states he has hx of SI w/ attempts.  Pt states he has formulated plan to "jump off a bridge to really make sure I do it this time", with access to means.  Pt denies HI.  Pt is not under the care of primary physician or psychiatry.  Pt had last seizure at 8pm yesterday evening and was tx'd at [facility].  Pt had admission to psych unit, but there were no beds available.  Pt was d/c'd approx 1 hour ago, and had 16oz of ETOH.  Pt states last illicit drug use was smoking cocaine yesterday evening.  Pt has hx of withdrawal seizures.  Pt placed on CO w/ seizure precautions in place.  Pt denies CP, HA, +LOC, or recent head trauma.  Pt pending psych eval.  Will continue to monitor.

## 2017-07-22 NOTE — ED ADULT TRIAGE NOTE - CHIEF COMPLAINT QUOTE
"I had a seizure 30 mins ago and I am feeling suicidal and if I had money I would of overdosed on heroin". Pt states he is not taking seizure medication because he though he would be fine and he has hx of bipolar disorder and depression.

## 2017-07-22 NOTE — ED BEHAVIORAL HEALTH ASSESSMENT NOTE - DESCRIPTION (FIRST USE, LAST USE, QUANTITY, FREQUENCY, DURATION)
Reports "almost everyday" with last use today: 16oz; prior to this, reports use of 2pints of alcohol yesterday Reports "not everyday" but yesterday used "$20 worth"

## 2017-07-22 NOTE — ED ADULT NURSE NOTE - CHPI ED SYMPTOMS NEG
no homicidal/no disorientation/no hallucinations/no weight loss/no weakness/no confusion/no paranoia/no change in level of consciousness

## 2017-07-22 NOTE — ED BEHAVIORAL HEALTH ASSESSMENT NOTE - SAFETY PLAN DETAILS
Patient advised to return to nearest Er or call 911 if symptoms dev/worsen; patient provided with above referrals

## 2017-07-22 NOTE — ED PROVIDER NOTE - MEDICAL DECISION MAKING DETAILS
polysubstance abuse malingering seen and eval by psych - no indications for admission medically stable  slight elev WBC  was seen and eval at [facility] 1 day ago  spent overnite in ED  and in psych as well was malingering

## 2017-07-22 NOTE — ED BEHAVIORAL HEALTH ASSESSMENT NOTE - SUMMARY
33yo homeless, unemployed M with history of substance use inc alcohol, cocaine, K2 presents with self reported suicidality for past 1 month. Patient's reporting of symptoms indicate lack of neurovegetative s/sx of depression (speaks about being hungry - attempting to beg for food, wanting to sleep, intact energy, hopeful about returning to [employer]). Despite his reported chronic suicidal ideations since being kicked out of [employer], patient has not engaged in any self harming behavior. His behavior and current behavior of lack of intent. Patient is not verbalizing a concrete, specific plan. Furthermore, patient speaks of future oriented plans including about seeking outpatient care and about seeking substance treatment (including wanting inpatient detox, rehab) places. Patient reports that will go to either [facility] or [facility] to seek substance detox treatment for alcohol. He is linear in his thought process - no disorganized beh/speech, no negative psychotic symptoms, no delusions and no perceptual disturbance noted/reported - indicating lack of acute psychotic symptoms. Furthermore, patient is euthymic, laughing appropriately at jokes with full range of affect without pressured speech, without FOA and without increased goal directed activities or irritability to suggest a manic episode. Patient's current issues and subjective mood appear related to his ongoing substance use - alcohol intoxication vs crashing from cocaine use yesterday. There is also strong possibility that patient is seeking secondary gains given his current lack of stable housing. Although patient presents with multiple chronic risk factors including homelessness, unemployed, ongoing substance use, reported past suicidal behavior, there are multiple protective factors including his future orientated plans including immediate plans about seeking substance treatment units, his ability to seek help. He also presents with conflicting statements and actions that indicate of somebody who without intent or desire to harm himself. Thus there is no acute indication of an imminent psychiatric risk of harming himself or others, or of indication that patient's unable to care for himself to warrant an acute psychiatric intervention. Patient will benefit from substance treatment programs - including possibility inpatient rehab which is amenable to and referrals provided for [facility] and [facility].

## 2017-07-22 NOTE — ED BEHAVIORAL HEALTH ASSESSMENT NOTE - REFERRAL / APPOINTMENT DETAILS
Referrals for substance treatment programs including inpatient substance detox units: [facility] and [facility] (provided with handouts of other places in [place] as well); Outpatient referrals for walk in clinics at [facility] provided as well

## 2017-07-22 NOTE — ED PROVIDER NOTE - OBJECTIVE STATEMENT
31 yo male with hx of  epilepsy- -noncompliant with depakote and tegretol , SI - substance abuse- prior multiple admits to [facility] in the past 2 years for similar -- states he was at [place] ED 1 day ago was c/ of SI- had smoked cocaine- now states he has SI -  no plan  vague thoughts  no CP or SOB - pt arrived asking for valium or klonopin

## 2017-07-27 LAB
AMPHETAMINES BLD QL SCN: NEGATIVE — SIGNIFICANT CHANGE UP
BARBITURATES SERPLBLD QL: NEGATIVE — SIGNIFICANT CHANGE UP
BENZODIAZAPINES, SERUM: POSITIVE
CANNABINOIDS SERPLBLD QL SCN: NEGATIVE — SIGNIFICANT CHANGE UP
COCAINE+BZE SERPLBLD QL SCN: POSITIVE
METHADONE SERPL-MCNC: NEGATIVE — SIGNIFICANT CHANGE UP
OPIATES SERPL QL: NEGATIVE — SIGNIFICANT CHANGE UP
PCP BLD QL SCN: NEGATIVE — SIGNIFICANT CHANGE UP

## 2017-07-29 LAB
AMPHET UR-MCNC: NEGATIVE — SIGNIFICANT CHANGE UP
BARBITURATES, URINE.: NEGATIVE — SIGNIFICANT CHANGE UP
BENZODIAZ UR-MCNC: SIGNIFICANT CHANGE UP
COCAINE METAB.OTHER UR-MCNC: SIGNIFICANT CHANGE UP
CREATININE, URINE THERAPEUTIC: 171 MG/DL — SIGNIFICANT CHANGE UP
METHADONE UR-MCNC: NEGATIVE — SIGNIFICANT CHANGE UP
METHAQUALONE UR QL: NEGATIVE — SIGNIFICANT CHANGE UP
METHAQUALONE UR-MCNC: NEGATIVE — SIGNIFICANT CHANGE UP
OPIATES UR-MCNC: NEGATIVE — SIGNIFICANT CHANGE UP
PCP UR-MCNC: NEGATIVE — SIGNIFICANT CHANGE UP
PROPOXYPH UR QL: NEGATIVE — SIGNIFICANT CHANGE UP
THC UR QL: SIGNIFICANT CHANGE UP

## 2017-10-27 NOTE — ED BEHAVIORAL HEALTH ASSESSMENT NOTE - SUMMARY
Duplicate request for fluoxetine sent in Aug   No refill needed    Lily Rosado, RN     Patient is  a 31 year old undomiciled male with significant history of depression, suicidality and polysubstance abuse, multiple inpatient hospitalizations, suicide attempts, non-compliance with treatment who was brought by ambulance after being found unresponsive and to be having a seizure in context of alcohol and K2 use several hours after discharge from [facility]. While in ED, patient continued to endorse intermittent suicidal ideation though later retracted when no longer intoxicated and upon hearing would need psychiatric evaluation. Patient is not a reliable historian and seems to have severely impaired insight and judgment. Given patient's history of multiple suicide attempts in context of substance use, his ongoing inability to abstain from alcohol and K2 as shown by his use only several hours after discharge despite the consequences, and the strong association of suicidality with intoxication, patient is not currently safe to be discharged at this time.

## 2018-08-11 ENCOUNTER — EMERGENCY (EMERGENCY)
Facility: HOSPITAL | Age: 33
LOS: 1 days | Discharge: HOME | End: 2018-08-11
Attending: EMERGENCY MEDICINE | Admitting: EMERGENCY MEDICINE
Payer: COMMERCIAL

## 2018-08-11 VITALS
DIASTOLIC BLOOD PRESSURE: 100 MMHG | RESPIRATION RATE: 16 BRPM | SYSTOLIC BLOOD PRESSURE: 155 MMHG | TEMPERATURE: 98 F | HEART RATE: 104 BPM

## 2018-08-11 VITALS
TEMPERATURE: 98 F | DIASTOLIC BLOOD PRESSURE: 79 MMHG | RESPIRATION RATE: 18 BRPM | HEART RATE: 90 BPM | SYSTOLIC BLOOD PRESSURE: 127 MMHG | OXYGEN SATURATION: 98 %

## 2018-08-11 DIAGNOSIS — F17.200 NICOTINE DEPENDENCE, UNSPECIFIED, UNCOMPLICATED: ICD-10-CM

## 2018-08-11 DIAGNOSIS — Z79.899 OTHER LONG TERM (CURRENT) DRUG THERAPY: ICD-10-CM

## 2018-08-11 DIAGNOSIS — I10 ESSENTIAL (PRIMARY) HYPERTENSION: ICD-10-CM

## 2018-08-11 DIAGNOSIS — R07.89 OTHER CHEST PAIN: ICD-10-CM

## 2018-08-11 DIAGNOSIS — F12.10 CANNABIS ABUSE, UNCOMPLICATED: ICD-10-CM

## 2018-08-11 DIAGNOSIS — F10.10 ALCOHOL ABUSE, UNCOMPLICATED: ICD-10-CM

## 2018-08-11 DIAGNOSIS — F14.10 COCAINE ABUSE, UNCOMPLICATED: ICD-10-CM

## 2018-08-11 LAB
ALBUMIN SERPL ELPH-MCNC: 4.8 G/DL — SIGNIFICANT CHANGE UP (ref 3.3–5)
ALP SERPL-CCNC: 45 U/L — SIGNIFICANT CHANGE UP (ref 40–120)
ALT FLD-CCNC: 22 U/L — SIGNIFICANT CHANGE UP (ref 10–45)
ANION GAP SERPL CALC-SCNC: 18 MMOL/L — HIGH (ref 5–17)
APTT BLD: 30.6 SEC — SIGNIFICANT CHANGE UP (ref 27.5–37.4)
AST SERPL-CCNC: 22 U/L — SIGNIFICANT CHANGE UP (ref 10–40)
BASOPHILS NFR BLD AUTO: 0.2 % — SIGNIFICANT CHANGE UP (ref 0–2)
BILIRUB SERPL-MCNC: 0.6 MG/DL — SIGNIFICANT CHANGE UP (ref 0.2–1.2)
BUN SERPL-MCNC: 11 MG/DL — SIGNIFICANT CHANGE UP (ref 7–23)
CALCIUM SERPL-MCNC: 9.9 MG/DL — SIGNIFICANT CHANGE UP (ref 8.4–10.5)
CHLORIDE SERPL-SCNC: 103 MMOL/L — SIGNIFICANT CHANGE UP (ref 96–108)
CK MB CFR SERPL CALC: 3.4 NG/ML — SIGNIFICANT CHANGE UP (ref 0–6.7)
CO2 SERPL-SCNC: 24 MMOL/L — SIGNIFICANT CHANGE UP (ref 22–31)
CREAT SERPL-MCNC: 0.63 MG/DL — SIGNIFICANT CHANGE UP (ref 0.5–1.3)
EOSINOPHIL NFR BLD AUTO: 1.4 % — SIGNIFICANT CHANGE UP (ref 0–6)
ETHANOL SERPL-MCNC: 78 MG/DL — HIGH (ref 0–10)
GLUCOSE SERPL-MCNC: 91 MG/DL — SIGNIFICANT CHANGE UP (ref 70–99)
HCT VFR BLD CALC: 42.8 % — SIGNIFICANT CHANGE UP (ref 39–50)
HGB BLD-MCNC: 14.9 G/DL — SIGNIFICANT CHANGE UP (ref 13–17)
INR BLD: 1.11 — SIGNIFICANT CHANGE UP (ref 0.88–1.16)
LYMPHOCYTES # BLD AUTO: 24.8 % — SIGNIFICANT CHANGE UP (ref 13–44)
MCHC RBC-ENTMCNC: 30.5 PG — SIGNIFICANT CHANGE UP (ref 27–34)
MCHC RBC-ENTMCNC: 34.8 G/DL — SIGNIFICANT CHANGE UP (ref 32–36)
MCV RBC AUTO: 87.7 FL — SIGNIFICANT CHANGE UP (ref 80–100)
MONOCYTES NFR BLD AUTO: 10.4 % — SIGNIFICANT CHANGE UP (ref 2–14)
NEUTROPHILS NFR BLD AUTO: 63.2 % — SIGNIFICANT CHANGE UP (ref 43–77)
PLATELET # BLD AUTO: 310 K/UL — SIGNIFICANT CHANGE UP (ref 150–400)
POTASSIUM SERPL-MCNC: 3.7 MMOL/L — SIGNIFICANT CHANGE UP (ref 3.5–5.3)
POTASSIUM SERPL-SCNC: 3.7 MMOL/L — SIGNIFICANT CHANGE UP (ref 3.5–5.3)
PROT SERPL-MCNC: 7.4 G/DL — SIGNIFICANT CHANGE UP (ref 6–8.3)
PROTHROM AB SERPL-ACNC: 12.3 SEC — SIGNIFICANT CHANGE UP (ref 9.8–12.7)
RBC # BLD: 4.88 M/UL — SIGNIFICANT CHANGE UP (ref 4.2–5.8)
RBC # FLD: 12.5 % — SIGNIFICANT CHANGE UP (ref 10.3–16.9)
SODIUM SERPL-SCNC: 145 MMOL/L — SIGNIFICANT CHANGE UP (ref 135–145)
TROPONIN T SERPL-MCNC: <0.01 NG/ML — SIGNIFICANT CHANGE UP (ref 0–0.01)
WBC # BLD: 14.3 K/UL — HIGH (ref 3.8–10.5)
WBC # FLD AUTO: 14.3 K/UL — HIGH (ref 3.8–10.5)

## 2018-08-11 PROCEDURE — 99285 EMERGENCY DEPT VISIT HI MDM: CPT | Mod: 25

## 2018-08-11 PROCEDURE — 84484 ASSAY OF TROPONIN QUANT: CPT

## 2018-08-11 PROCEDURE — 85610 PROTHROMBIN TIME: CPT

## 2018-08-11 PROCEDURE — 85730 THROMBOPLASTIN TIME PARTIAL: CPT

## 2018-08-11 PROCEDURE — 36415 COLL VENOUS BLD VENIPUNCTURE: CPT

## 2018-08-11 PROCEDURE — 82550 ASSAY OF CK (CPK): CPT

## 2018-08-11 PROCEDURE — 82553 CREATINE MB FRACTION: CPT

## 2018-08-11 PROCEDURE — 99283 EMERGENCY DEPT VISIT LOW MDM: CPT

## 2018-08-11 PROCEDURE — 85025 COMPLETE CBC W/AUTO DIFF WBC: CPT

## 2018-08-11 PROCEDURE — 80307 DRUG TEST PRSMV CHEM ANLYZR: CPT

## 2018-08-11 PROCEDURE — 71045 X-RAY EXAM CHEST 1 VIEW: CPT | Mod: 26

## 2018-08-11 PROCEDURE — 71045 X-RAY EXAM CHEST 1 VIEW: CPT

## 2018-08-11 PROCEDURE — 80053 COMPREHEN METABOLIC PANEL: CPT

## 2018-08-11 RX ORDER — SODIUM CHLORIDE 9 MG/ML
1000 INJECTION INTRAMUSCULAR; INTRAVENOUS; SUBCUTANEOUS ONCE
Qty: 0 | Refills: 0 | Status: COMPLETED | OUTPATIENT
Start: 2018-08-11 | End: 2018-08-11

## 2018-08-11 RX ADMIN — SODIUM CHLORIDE 2000 MILLILITER(S): 9 INJECTION INTRAMUSCULAR; INTRAVENOUS; SUBCUTANEOUS at 18:50

## 2018-08-11 NOTE — ED PROVIDER NOTE - MEDICAL DECISION MAKING DETAILS
33 yo male with hx of seizures, bipolar disorder, poly-substance abuse, HTN (denies any chronic meds), recent discharge from [facility] (2 days ago) after 6 month stay for drug and alcohol rehab p/w "I relapsed right after they discharged me". Pt states he was sober for 6 months, but upon discharge 2 days ago started using alcohol, cocaine and K2 with last use earlier today. Had some mid chest tightness with no radiation earlier today and states he felt anxious as he was using cocaine and comes to ED for evaluation. Denies SI/HI/ hallucinations. States he wants to "get checked out". Denies SOB, N,V,D. EKG/CXR with NAD. Labs/ studies noted. Pt feeling much better. Dcd with outpt f/up.

## 2018-08-11 NOTE — ED ADULT NURSE NOTE - NSIMPLEMENTINTERV_GEN_ALL_ED
Implemented All Universal Safety Interventions:  [place] to call system. Call bell, personal items and telephone within reach. Instruct patient to call for assistance. Room bathroom lighting operational. Non-slip footwear when patient is off stretcher. Physically safe environment: no spills, clutter or unnecessary equipment. Stretcher in lowest position, wheels locked, appropriate side rails in place.

## 2018-08-11 NOTE — ED PROVIDER NOTE - OBJECTIVE STATEMENT
33 yo male with hx of seizures, bipolar disorder, poly-substance abuse, HTN (denies any chronic meds), recent discharge from [facility] (2 days ago) after 6 month stay for drug and alcohol rehab p/w "I relapsed right after they discharged me". Pt states he was sober for 6 months, but upon discharge 2 days ago started using alcohol, cocaine and K2 with last use earlier today. Had some mid chest tightness with no radiation earlier today and states he felt anxious as he was using cocaine and comes to ED for evaluation. Denies SI/HI/ hallucinations. States he wants to "get checked out". Denies SOB, N,V,D, abd pain, HA, fevers, chills. 33 yo male with hx of seizures, bipolar disorder, poly-substance abuse, HTN (denies any meds for HTN), recent discharge from [facility] (2 days ago) after 6 month stay for drug and alcohol rehab p/w "I relapsed right after they discharged me". Pt states he was sober for 6 months, but upon discharge 2 days ago started using alcohol, cocaine and K2 with last use earlier today. Had some mid chest tightness with no radiation earlier today and states he felt anxious as he was using cocaine and comes to ED for evaluation. Denies SI/HI/ hallucinations. States he wants to "get checked out". Denies SOB, N,V,D, abd pain, HA, fevers, chills.

## 2018-08-11 NOTE — ED ADULT TRIAGE NOTE - CHIEF COMPLAINT QUOTE
Patient was dc'ed from drug rehab a week, has been doing coke, alcohol and K2 since.  C/o of chest pain, ekg being done.  Recevied aspirin 162 mg PO by EMS

## 2018-08-11 NOTE — ED ADULT NURSE REASSESSMENT NOTE - NS ED NURSE REASSESS COMMENT FT1
Swelling noted to the IV site. Iv has been removed and warm applied to the site. A new IV line has been placed by JANET Mcfarlane.

## 2018-08-11 NOTE — ED ADULT NURSE NOTE - OBJECTIVE STATEMENT
Pt presents with intermittent chest pain since Thursday. Pt states he was d/c'ed from rehab on Thursday 8/9 and has been using cocaine, k2 and etoh since. Last use was this AM. Pt currently chest pain free. EKG done.

## 2018-09-28 ENCOUNTER — HOSPITAL ENCOUNTER (INPATIENT)
Dept: HOSPITAL 74 - YASAS | Age: 33
LOS: 4 days | Discharge: OTHER INSTITUTION | End: 2018-10-02
Attending: INTERNAL MEDICINE | Admitting: INTERNAL MEDICINE
Payer: COMMERCIAL

## 2018-09-28 VITALS — BODY MASS INDEX: 28.3 KG/M2

## 2018-09-28 DIAGNOSIS — F10.230: Primary | ICD-10-CM

## 2018-09-28 DIAGNOSIS — F14.90: ICD-10-CM

## 2018-09-28 DIAGNOSIS — F19.24: ICD-10-CM

## 2018-09-28 DIAGNOSIS — F41.9: ICD-10-CM

## 2018-09-28 DIAGNOSIS — R00.0: ICD-10-CM

## 2018-09-28 DIAGNOSIS — D72.829: ICD-10-CM

## 2018-09-28 DIAGNOSIS — F31.9: ICD-10-CM

## 2018-09-28 DIAGNOSIS — G47.00: ICD-10-CM

## 2018-09-28 DIAGNOSIS — Z59.0: ICD-10-CM

## 2018-09-28 DIAGNOSIS — Z91.19: ICD-10-CM

## 2018-09-28 DIAGNOSIS — F13.10: ICD-10-CM

## 2018-09-28 DIAGNOSIS — F17.210: ICD-10-CM

## 2018-09-28 LAB
APPEARANCE UR: CLEAR
BILIRUB UR STRIP.AUTO-MCNC: NEGATIVE MG/DL
COLOR UR: COLORLESS
KETONES UR QL STRIP: NEGATIVE
LEUKOCYTE ESTERASE UR QL STRIP.AUTO: NEGATIVE
NITRITE UR QL STRIP: NEGATIVE
PH UR: 7 [PH] (ref 5–8)
PROT UR QL STRIP: NEGATIVE
PROT UR QL STRIP: NEGATIVE
SP GR UR: 1 (ref 1–1.03)
UROBILINOGEN UR STRIP-MCNC: NEGATIVE MG/DL (ref 0.2–1)

## 2018-09-28 PROCEDURE — G0009 ADMIN PNEUMOCOCCAL VACCINE: HCPCS

## 2018-09-28 PROCEDURE — HZ2ZZZZ DETOXIFICATION SERVICES FOR SUBSTANCE ABUSE TREATMENT: ICD-10-PCS | Performed by: INTERNAL MEDICINE

## 2018-09-28 PROCEDURE — G0008 ADMIN INFLUENZA VIRUS VAC: HCPCS

## 2018-09-28 RX ADMIN — Medication SCH MG: at 22:13

## 2018-09-28 RX ADMIN — IBUPROFEN PRN MG: 400 TABLET, FILM COATED ORAL at 22:23

## 2018-09-28 RX ADMIN — NICOTINE POLACRILEX PRN MG: 4 GUM, CHEWING ORAL at 17:12

## 2018-09-28 RX ADMIN — ACETAMINOPHEN PRN MG: 325 TABLET ORAL at 18:03

## 2018-09-28 RX ADMIN — NICOTINE POLACRILEX PRN MG: 4 GUM, CHEWING ORAL at 20:01

## 2018-09-28 RX ADMIN — IBUPROFEN PRN MG: 400 TABLET, FILM COATED ORAL at 14:14

## 2018-09-28 RX ADMIN — NICOTINE POLACRILEX PRN MG: 4 GUM, CHEWING ORAL at 15:16

## 2018-09-28 RX ADMIN — NICOTINE POLACRILEX PRN MG: 4 GUM, CHEWING ORAL at 13:11

## 2018-09-28 SDOH — ECONOMIC STABILITY - HOUSING INSECURITY: HOMELESSNESS: Z59.0

## 2018-09-29 LAB
ALBUMIN SERPL-MCNC: 3.7 G/DL (ref 3.4–5)
ALP SERPL-CCNC: 61 U/L (ref 45–117)
ALT SERPL-CCNC: 71 U/L (ref 13–61)
ANION GAP SERPL CALC-SCNC: 9 MMOL/L (ref 8–16)
AST SERPL-CCNC: 37 U/L (ref 15–37)
BILIRUB SERPL-MCNC: 0.3 MG/DL (ref 0.2–1)
BUN SERPL-MCNC: 12 MG/DL (ref 7–18)
CALCIUM SERPL-MCNC: 8.8 MG/DL (ref 8.5–10.1)
CHLORIDE SERPL-SCNC: 101 MMOL/L (ref 98–107)
CO2 SERPL-SCNC: 26 MMOL/L (ref 21–32)
CREAT SERPL-MCNC: 0.6 MG/DL (ref 0.55–1.3)
DEPRECATED RDW RBC AUTO: 14.1 % (ref 11.9–15.9)
GLUCOSE SERPL-MCNC: 114 MG/DL (ref 74–106)
HCT VFR BLD CALC: 43.7 % (ref 35.4–49)
HGB BLD-MCNC: 14.4 GM/DL (ref 11.7–16.9)
MCH RBC QN AUTO: 30.1 PG (ref 25.7–33.7)
MCHC RBC AUTO-ENTMCNC: 32.9 G/DL (ref 32–35.9)
MCV RBC: 91.4 FL (ref 80–96)
PLATELET # BLD AUTO: 440 K/MM3 (ref 134–434)
PMV BLD: 6.7 FL (ref 7.5–11.1)
POTASSIUM SERPLBLD-SCNC: 4.1 MMOL/L (ref 3.5–5.1)
PROT SERPL-MCNC: 6.9 G/DL (ref 6.4–8.2)
RBC # BLD AUTO: 4.78 M/MM3 (ref 4–5.6)
SODIUM SERPL-SCNC: 136 MMOL/L (ref 136–145)
WBC # BLD AUTO: 11.3 K/MM3 (ref 4–10)

## 2018-09-29 RX ADMIN — NICOTINE POLACRILEX PRN MG: 4 GUM, CHEWING ORAL at 12:02

## 2018-09-29 RX ADMIN — NICOTINE POLACRILEX PRN MG: 4 GUM, CHEWING ORAL at 18:45

## 2018-09-29 RX ADMIN — Medication SCH TAB: at 09:09

## 2018-09-29 RX ADMIN — NICOTINE POLACRILEX PRN MG: 4 GUM, CHEWING ORAL at 07:45

## 2018-09-29 RX ADMIN — TRAZODONE HYDROCHLORIDE SCH MG: 50 TABLET ORAL at 23:08

## 2018-09-29 RX ADMIN — GABAPENTIN SCH MG: 300 CAPSULE ORAL at 14:34

## 2018-09-29 RX ADMIN — ACETAMINOPHEN PRN MG: 325 TABLET ORAL at 07:44

## 2018-09-29 RX ADMIN — IBUPROFEN PRN MG: 400 TABLET, FILM COATED ORAL at 15:38

## 2018-09-29 RX ADMIN — NICOTINE POLACRILEX PRN MG: 4 GUM, CHEWING ORAL at 04:49

## 2018-09-29 RX ADMIN — Medication SCH MG: at 23:08

## 2018-09-29 RX ADMIN — NICOTINE POLACRILEX PRN MG: 4 GUM, CHEWING ORAL at 15:39

## 2018-09-29 RX ADMIN — ACETAMINOPHEN PRN MG: 325 TABLET ORAL at 17:48

## 2018-09-29 RX ADMIN — RISPERIDONE SCH MG: 0.5 TABLET ORAL at 23:08

## 2018-09-29 RX ADMIN — GABAPENTIN SCH MG: 300 CAPSULE ORAL at 23:08

## 2018-09-30 RX ADMIN — GABAPENTIN SCH MG: 300 CAPSULE ORAL at 22:28

## 2018-09-30 RX ADMIN — ACETAMINOPHEN PRN MG: 325 TABLET ORAL at 06:49

## 2018-09-30 RX ADMIN — GABAPENTIN SCH MG: 300 CAPSULE ORAL at 14:02

## 2018-09-30 RX ADMIN — CYCLOBENZAPRINE HYDROCHLORIDE PRN MG: 10 TABLET, FILM COATED ORAL at 22:28

## 2018-09-30 RX ADMIN — CYCLOBENZAPRINE HYDROCHLORIDE PRN MG: 10 TABLET, FILM COATED ORAL at 12:33

## 2018-09-30 RX ADMIN — NICOTINE POLACRILEX PRN MG: 4 GUM, CHEWING ORAL at 09:03

## 2018-09-30 RX ADMIN — Medication SCH MG: at 22:31

## 2018-09-30 RX ADMIN — RISPERIDONE SCH MG: 0.5 TABLET ORAL at 10:25

## 2018-09-30 RX ADMIN — NICOTINE POLACRILEX PRN MG: 4 GUM, CHEWING ORAL at 18:40

## 2018-09-30 RX ADMIN — NICOTINE POLACRILEX PRN MG: 4 GUM, CHEWING ORAL at 13:19

## 2018-09-30 RX ADMIN — IBUPROFEN PRN MG: 400 TABLET, FILM COATED ORAL at 12:33

## 2018-09-30 RX ADMIN — HYDROXYZINE PAMOATE PRN MG: 50 CAPSULE ORAL at 12:33

## 2018-09-30 RX ADMIN — RISPERIDONE SCH MG: 0.5 TABLET ORAL at 22:30

## 2018-09-30 RX ADMIN — NICOTINE POLACRILEX PRN MG: 4 GUM, CHEWING ORAL at 05:36

## 2018-09-30 RX ADMIN — TRAZODONE HYDROCHLORIDE SCH MG: 50 TABLET ORAL at 22:28

## 2018-09-30 RX ADMIN — Medication SCH TAB: at 10:25

## 2018-09-30 RX ADMIN — GABAPENTIN SCH MG: 300 CAPSULE ORAL at 05:35

## 2018-10-01 LAB
BASOPHILS # BLD: 0.3 % (ref 0–2)
DEPRECATED RDW RBC AUTO: 14.5 % (ref 11.9–15.9)
EOSINOPHIL # BLD: 1.3 % (ref 0–4.5)
HCT VFR BLD CALC: 38.9 % (ref 35.4–49)
HGB BLD-MCNC: 12.5 GM/DL (ref 11.7–16.9)
LYMPHOCYTES # BLD: 13.6 % (ref 8–40)
MCH RBC QN AUTO: 29.6 PG (ref 25.7–33.7)
MCHC RBC AUTO-ENTMCNC: 32.2 G/DL (ref 32–35.9)
MCV RBC: 91.9 FL (ref 80–96)
MONOCYTES # BLD AUTO: 6 % (ref 3.8–10.2)
NEUTROPHILS # BLD: 78.8 % (ref 42.8–82.8)
PLATELET # BLD AUTO: 334 K/MM3 (ref 134–434)
PMV BLD: 6.5 FL (ref 7.5–11.1)
RBC # BLD AUTO: 4.23 M/MM3 (ref 4–5.6)
WBC # BLD AUTO: 19.4 K/MM3 (ref 4–10)

## 2018-10-01 RX ADMIN — HYDROXYZINE PAMOATE PRN MG: 50 CAPSULE ORAL at 14:01

## 2018-10-01 RX ADMIN — Medication SCH MG: at 22:19

## 2018-10-01 RX ADMIN — CYCLOBENZAPRINE HYDROCHLORIDE PRN MG: 10 TABLET, FILM COATED ORAL at 10:32

## 2018-10-01 RX ADMIN — NICOTINE POLACRILEX PRN MG: 4 GUM, CHEWING ORAL at 20:24

## 2018-10-01 RX ADMIN — CYCLOBENZAPRINE HYDROCHLORIDE PRN MG: 10 TABLET, FILM COATED ORAL at 16:58

## 2018-10-01 RX ADMIN — RISPERIDONE SCH MG: 0.5 TABLET ORAL at 22:19

## 2018-10-01 RX ADMIN — Medication SCH TAB: at 10:32

## 2018-10-01 RX ADMIN — GABAPENTIN SCH MG: 300 CAPSULE ORAL at 05:32

## 2018-10-01 RX ADMIN — GABAPENTIN SCH MG: 300 CAPSULE ORAL at 14:01

## 2018-10-01 RX ADMIN — IBUPROFEN PRN MG: 400 TABLET, FILM COATED ORAL at 22:51

## 2018-10-01 RX ADMIN — RISPERIDONE SCH MG: 0.5 TABLET ORAL at 10:32

## 2018-10-01 RX ADMIN — IBUPROFEN PRN MG: 400 TABLET, FILM COATED ORAL at 11:41

## 2018-10-01 RX ADMIN — TRAZODONE HYDROCHLORIDE SCH MG: 50 TABLET ORAL at 22:19

## 2018-10-01 RX ADMIN — NICOTINE POLACRILEX PRN MG: 4 GUM, CHEWING ORAL at 11:41

## 2018-10-01 RX ADMIN — GABAPENTIN SCH MG: 300 CAPSULE ORAL at 22:19

## 2018-10-01 RX ADMIN — NICOTINE POLACRILEX PRN MG: 4 GUM, CHEWING ORAL at 17:32

## 2018-10-02 ENCOUNTER — HOSPITAL ENCOUNTER (INPATIENT)
Dept: HOSPITAL 74 - YASAS | Age: 33
LOS: 8 days | Discharge: HOME | DRG: 772 | End: 2018-10-10
Attending: PSYCHIATRY & NEUROLOGY | Admitting: PSYCHIATRY & NEUROLOGY
Payer: COMMERCIAL

## 2018-10-02 VITALS — TEMPERATURE: 96.9 F | HEART RATE: 105 BPM | SYSTOLIC BLOOD PRESSURE: 116 MMHG | DIASTOLIC BLOOD PRESSURE: 74 MMHG

## 2018-10-02 DIAGNOSIS — F90.9: ICD-10-CM

## 2018-10-02 DIAGNOSIS — F19.280: ICD-10-CM

## 2018-10-02 DIAGNOSIS — F17.210: ICD-10-CM

## 2018-10-02 DIAGNOSIS — F12.20: ICD-10-CM

## 2018-10-02 DIAGNOSIS — F13.20: ICD-10-CM

## 2018-10-02 DIAGNOSIS — G47.00: ICD-10-CM

## 2018-10-02 DIAGNOSIS — J02.9: ICD-10-CM

## 2018-10-02 DIAGNOSIS — F19.282: ICD-10-CM

## 2018-10-02 DIAGNOSIS — F10.20: Primary | ICD-10-CM

## 2018-10-02 DIAGNOSIS — D72.829: ICD-10-CM

## 2018-10-02 DIAGNOSIS — F31.9: ICD-10-CM

## 2018-10-02 LAB
BASOPHILS # BLD: 0.4 % (ref 0–2)
DEPRECATED RDW RBC AUTO: 14 % (ref 11.9–15.9)
EOSINOPHIL # BLD: 2.3 % (ref 0–4.5)
HCT VFR BLD CALC: 39.8 % (ref 35.4–49)
HGB BLD-MCNC: 12.7 GM/DL (ref 11.7–16.9)
LYMPHOCYTES # BLD: 24.6 % (ref 8–40)
MCH RBC QN AUTO: 29.8 PG (ref 25.7–33.7)
MCHC RBC AUTO-ENTMCNC: 31.8 G/DL (ref 32–35.9)
MCV RBC: 93.8 FL (ref 80–96)
MONOCYTES # BLD AUTO: 6.8 % (ref 3.8–10.2)
NEUTROPHILS # BLD: 65.9 % (ref 42.8–82.8)
PLATELET # BLD AUTO: 321 K/MM3 (ref 134–434)
PMV BLD: 6.8 FL (ref 7.5–11.1)
RBC # BLD AUTO: 4.25 M/MM3 (ref 4–5.6)
WBC # BLD AUTO: 14.1 K/MM3 (ref 4–10)

## 2018-10-02 PROCEDURE — HZ42ZZZ GROUP COUNSELING FOR SUBSTANCE ABUSE TREATMENT, COGNITIVE-BEHAVIORAL: ICD-10-PCS | Performed by: PSYCHIATRY & NEUROLOGY

## 2018-10-02 RX ADMIN — GABAPENTIN SCH MG: 300 CAPSULE ORAL at 21:18

## 2018-10-02 RX ADMIN — Medication SCH MG: at 21:17

## 2018-10-02 RX ADMIN — HYDROXYZINE PAMOATE PRN MG: 25 CAPSULE ORAL at 18:52

## 2018-10-02 RX ADMIN — NICOTINE POLACRILEX PRN MG: 2 GUM, CHEWING ORAL at 22:24

## 2018-10-02 RX ADMIN — TRAZODONE HYDROCHLORIDE SCH MG: 100 TABLET ORAL at 21:18

## 2018-10-02 RX ADMIN — IBUPROFEN PRN MG: 400 TABLET, FILM COATED ORAL at 21:18

## 2018-10-02 RX ADMIN — Medication SCH TAB: at 10:23

## 2018-10-02 RX ADMIN — RISPERIDONE SCH MG: 0.5 TABLET ORAL at 21:18

## 2018-10-02 RX ADMIN — HYDROXYZINE PAMOATE PRN MG: 50 CAPSULE ORAL at 00:38

## 2018-10-02 RX ADMIN — NICOTINE POLACRILEX PRN MG: 2 GUM, CHEWING ORAL at 18:35

## 2018-10-02 RX ADMIN — RISPERIDONE SCH MG: 0.5 TABLET ORAL at 10:23

## 2018-10-02 RX ADMIN — ACETAMINOPHEN PRN MG: 325 TABLET ORAL at 09:20

## 2018-10-02 RX ADMIN — CYCLOBENZAPRINE HYDROCHLORIDE PRN MG: 10 TABLET, FILM COATED ORAL at 05:38

## 2018-10-02 RX ADMIN — GABAPENTIN SCH MG: 300 CAPSULE ORAL at 05:38

## 2018-10-03 LAB
ANISOCYTOSIS BLD QL: 0
BASOPHILS # BLD: 0.4 % (ref 0–2)
DEPRECATED RDW RBC AUTO: 14.1 % (ref 11.9–15.9)
EOSINOPHIL # BLD: 3.3 % (ref 0–4.5)
HCT VFR BLD CALC: 37.9 % (ref 35.4–49)
HGB BLD-MCNC: 12.6 GM/DL (ref 11.7–16.9)
LYMPHOCYTES # BLD: 28.1 % (ref 8–40)
MACROCYTES BLD QL: 0
MCH RBC QN AUTO: 30.6 PG (ref 25.7–33.7)
MCHC RBC AUTO-ENTMCNC: 33.1 G/DL (ref 32–35.9)
MCV RBC: 92.4 FL (ref 80–96)
MONOCYTES # BLD AUTO: 10.4 % (ref 3.8–10.2)
NEUTROPHILS # BLD: 57.8 % (ref 42.8–82.8)
PLATELET # BLD AUTO: 339 K/MM3 (ref 134–434)
PLATELET BLD QL SMEAR: NORMAL
PMV BLD: 6.7 FL (ref 7.5–11.1)
RBC # BLD AUTO: 4.11 M/MM3 (ref 4–5.6)
WBC # BLD AUTO: 9 K/MM3 (ref 4–10)

## 2018-10-03 RX ADMIN — BENZTROPINE MESYLATE SCH MG: 1 TABLET ORAL at 21:28

## 2018-10-03 RX ADMIN — RISPERIDONE SCH: 0.5 TABLET ORAL at 11:07

## 2018-10-03 RX ADMIN — Medication SCH MG: at 21:27

## 2018-10-03 RX ADMIN — HYDROXYZINE PAMOATE PRN MG: 50 CAPSULE ORAL at 16:38

## 2018-10-03 RX ADMIN — GABAPENTIN SCH MG: 300 CAPSULE ORAL at 06:17

## 2018-10-03 RX ADMIN — NICOTINE SCH MG: 14 PATCH, EXTENDED RELEASE TRANSDERMAL at 10:29

## 2018-10-03 RX ADMIN — GABAPENTIN SCH MG: 400 CAPSULE ORAL at 21:27

## 2018-10-03 RX ADMIN — NICOTINE POLACRILEX PRN MG: 2 GUM, CHEWING ORAL at 18:58

## 2018-10-03 RX ADMIN — HYDROXYZINE PAMOATE PRN MG: 25 CAPSULE ORAL at 04:15

## 2018-10-03 RX ADMIN — GABAPENTIN SCH MG: 400 CAPSULE ORAL at 14:12

## 2018-10-03 RX ADMIN — ATOMOXETINE HYDROCHLORIDE SCH MG: 25 CAPSULE ORAL at 11:05

## 2018-10-03 RX ADMIN — BENZTROPINE MESYLATE SCH MG: 1 TABLET ORAL at 10:51

## 2018-10-03 RX ADMIN — Medication SCH TAB: at 10:29

## 2018-10-03 RX ADMIN — TRAZODONE HYDROCHLORIDE SCH MG: 100 TABLET ORAL at 21:27

## 2018-10-03 RX ADMIN — AZITHROMYCIN SCH MG: 250 TABLET, FILM COATED ORAL at 11:05

## 2018-10-03 RX ADMIN — HYDROXYZINE PAMOATE PRN MG: 50 CAPSULE ORAL at 12:17

## 2018-10-04 RX ADMIN — GABAPENTIN SCH MG: 400 CAPSULE ORAL at 14:02

## 2018-10-04 RX ADMIN — NICOTINE POLACRILEX PRN MG: 2 GUM, CHEWING ORAL at 14:04

## 2018-10-04 RX ADMIN — GABAPENTIN SCH MG: 400 CAPSULE ORAL at 06:19

## 2018-10-04 RX ADMIN — NICOTINE POLACRILEX PRN MG: 2 GUM, CHEWING ORAL at 10:11

## 2018-10-04 RX ADMIN — NICOTINE POLACRILEX PRN MG: 2 GUM, CHEWING ORAL at 16:58

## 2018-10-04 RX ADMIN — BENZTROPINE MESYLATE SCH MG: 1 TABLET ORAL at 10:08

## 2018-10-04 RX ADMIN — AZITHROMYCIN SCH MG: 250 TABLET, FILM COATED ORAL at 10:08

## 2018-10-04 RX ADMIN — TRAZODONE HYDROCHLORIDE SCH MG: 100 TABLET ORAL at 21:25

## 2018-10-04 RX ADMIN — HYDROXYZINE PAMOATE PRN MG: 50 CAPSULE ORAL at 20:25

## 2018-10-04 RX ADMIN — Medication SCH TAB: at 10:08

## 2018-10-04 RX ADMIN — HYDROXYZINE PAMOATE PRN MG: 50 CAPSULE ORAL at 14:02

## 2018-10-04 RX ADMIN — NICOTINE SCH MG: 14 PATCH, EXTENDED RELEASE TRANSDERMAL at 10:08

## 2018-10-04 RX ADMIN — Medication SCH MG: at 21:25

## 2018-10-04 RX ADMIN — IBUPROFEN PRN MG: 400 TABLET, FILM COATED ORAL at 16:56

## 2018-10-04 RX ADMIN — ACETAMINOPHEN PRN MG: 325 TABLET ORAL at 20:25

## 2018-10-04 RX ADMIN — ATOMOXETINE HYDROCHLORIDE SCH MG: 25 CAPSULE ORAL at 10:08

## 2018-10-04 RX ADMIN — GABAPENTIN SCH MG: 400 CAPSULE ORAL at 21:26

## 2018-10-04 RX ADMIN — BENZTROPINE MESYLATE SCH MG: 1 TABLET ORAL at 21:25

## 2018-10-04 RX ADMIN — NICOTINE POLACRILEX PRN MG: 2 GUM, CHEWING ORAL at 06:19

## 2018-10-04 RX ADMIN — HYDROXYZINE PAMOATE PRN MG: 50 CAPSULE ORAL at 06:19

## 2018-10-04 RX ADMIN — HYDROXYZINE PAMOATE PRN MG: 50 CAPSULE ORAL at 01:16

## 2018-10-05 RX ADMIN — GABAPENTIN SCH MG: 400 CAPSULE ORAL at 13:25

## 2018-10-05 RX ADMIN — ATOMOXETINE HYDROCHLORIDE SCH MG: 25 CAPSULE ORAL at 10:19

## 2018-10-05 RX ADMIN — GUAIFENESIN AND DEXTROMETHORPHAN PRN ML: 100; 10 SYRUP ORAL at 13:20

## 2018-10-05 RX ADMIN — NICOTINE POLACRILEX PRN MG: 2 GUM, CHEWING ORAL at 21:44

## 2018-10-05 RX ADMIN — HYDROXYZINE PAMOATE PRN MG: 50 CAPSULE ORAL at 16:17

## 2018-10-05 RX ADMIN — NICOTINE POLACRILEX PRN MG: 2 GUM, CHEWING ORAL at 07:44

## 2018-10-05 RX ADMIN — GUAIFENESIN AND DEXTROMETHORPHAN PRN ML: 100; 10 SYRUP ORAL at 22:42

## 2018-10-05 RX ADMIN — Medication SCH TAB: at 10:19

## 2018-10-05 RX ADMIN — HYDROXYZINE PAMOATE PRN MG: 50 CAPSULE ORAL at 06:49

## 2018-10-05 RX ADMIN — TRAZODONE HYDROCHLORIDE SCH MG: 50 TABLET ORAL at 21:43

## 2018-10-05 RX ADMIN — GABAPENTIN SCH MG: 400 CAPSULE ORAL at 21:43

## 2018-10-05 RX ADMIN — BENZTROPINE MESYLATE SCH MG: 1 TABLET ORAL at 21:42

## 2018-10-05 RX ADMIN — HYDROXYZINE PAMOATE PRN MG: 50 CAPSULE ORAL at 21:42

## 2018-10-05 RX ADMIN — BENZTROPINE MESYLATE SCH MG: 1 TABLET ORAL at 10:18

## 2018-10-05 RX ADMIN — NICOTINE SCH MG: 14 PATCH, EXTENDED RELEASE TRANSDERMAL at 10:20

## 2018-10-05 RX ADMIN — NICOTINE POLACRILEX PRN MG: 2 GUM, CHEWING ORAL at 10:20

## 2018-10-05 RX ADMIN — GABAPENTIN SCH MG: 400 CAPSULE ORAL at 06:49

## 2018-10-05 RX ADMIN — NICOTINE POLACRILEX PRN MG: 2 GUM, CHEWING ORAL at 12:49

## 2018-10-05 RX ADMIN — CYCLOBENZAPRINE HYDROCHLORIDE PRN MG: 10 TABLET, FILM COATED ORAL at 13:25

## 2018-10-05 RX ADMIN — Medication SCH MG: at 21:43

## 2018-10-05 RX ADMIN — AZITHROMYCIN SCH MG: 250 TABLET, FILM COATED ORAL at 10:18

## 2018-10-05 RX ADMIN — IBUPROFEN PRN MG: 600 TABLET, FILM COATED ORAL at 22:42

## 2018-10-06 RX ADMIN — GUAIFENESIN AND DEXTROMETHORPHAN PRN ML: 100; 10 SYRUP ORAL at 19:00

## 2018-10-06 RX ADMIN — GABAPENTIN SCH MG: 400 CAPSULE ORAL at 14:29

## 2018-10-06 RX ADMIN — HYDROXYZINE PAMOATE PRN MG: 50 CAPSULE ORAL at 17:39

## 2018-10-06 RX ADMIN — GABAPENTIN SCH MG: 400 CAPSULE ORAL at 21:34

## 2018-10-06 RX ADMIN — Medication SCH TAB: at 10:06

## 2018-10-06 RX ADMIN — BENZTROPINE MESYLATE SCH MG: 1 TABLET ORAL at 21:33

## 2018-10-06 RX ADMIN — TRAZODONE HYDROCHLORIDE SCH MG: 50 TABLET ORAL at 21:33

## 2018-10-06 RX ADMIN — GABAPENTIN SCH MG: 400 CAPSULE ORAL at 06:38

## 2018-10-06 RX ADMIN — CYCLOBENZAPRINE HYDROCHLORIDE PRN MG: 10 TABLET, FILM COATED ORAL at 14:32

## 2018-10-06 RX ADMIN — HYDROXYZINE PAMOATE PRN MG: 50 CAPSULE ORAL at 06:39

## 2018-10-06 RX ADMIN — Medication SCH MG: at 21:35

## 2018-10-06 RX ADMIN — CYCLOBENZAPRINE HYDROCHLORIDE PRN MG: 10 TABLET, FILM COATED ORAL at 06:39

## 2018-10-06 RX ADMIN — NICOTINE POLACRILEX PRN MG: 2 GUM, CHEWING ORAL at 17:39

## 2018-10-06 RX ADMIN — NICOTINE POLACRILEX PRN MG: 2 GUM, CHEWING ORAL at 07:33

## 2018-10-06 RX ADMIN — BENZTROPINE MESYLATE SCH MG: 1 TABLET ORAL at 10:05

## 2018-10-06 RX ADMIN — NICOTINE POLACRILEX PRN MG: 2 GUM, CHEWING ORAL at 14:29

## 2018-10-06 RX ADMIN — IBUPROFEN PRN MG: 600 TABLET, FILM COATED ORAL at 07:33

## 2018-10-06 RX ADMIN — NICOTINE POLACRILEX PRN MG: 2 GUM, CHEWING ORAL at 22:10

## 2018-10-06 RX ADMIN — ATOMOXETINE HYDROCHLORIDE SCH MG: 25 CAPSULE ORAL at 10:06

## 2018-10-06 RX ADMIN — IBUPROFEN PRN MG: 600 TABLET, FILM COATED ORAL at 21:34

## 2018-10-06 RX ADMIN — HYDROXYZINE PAMOATE PRN MG: 50 CAPSULE ORAL at 22:10

## 2018-10-06 RX ADMIN — NICOTINE SCH MG: 14 PATCH, EXTENDED RELEASE TRANSDERMAL at 10:06

## 2018-10-07 RX ADMIN — HYDROXYZINE PAMOATE PRN MG: 50 CAPSULE ORAL at 02:30

## 2018-10-07 RX ADMIN — BENZTROPINE MESYLATE SCH MG: 1 TABLET ORAL at 21:50

## 2018-10-07 RX ADMIN — BENZTROPINE MESYLATE SCH MG: 1 TABLET ORAL at 09:54

## 2018-10-07 RX ADMIN — GABAPENTIN SCH MG: 400 CAPSULE ORAL at 06:23

## 2018-10-07 RX ADMIN — ATOMOXETINE HYDROCHLORIDE SCH MG: 25 CAPSULE ORAL at 09:53

## 2018-10-07 RX ADMIN — NICOTINE SCH MG: 14 PATCH, EXTENDED RELEASE TRANSDERMAL at 09:53

## 2018-10-07 RX ADMIN — GABAPENTIN SCH MG: 400 CAPSULE ORAL at 13:09

## 2018-10-07 RX ADMIN — IBUPROFEN PRN MG: 600 TABLET, FILM COATED ORAL at 14:50

## 2018-10-07 RX ADMIN — TRAZODONE HYDROCHLORIDE SCH MG: 50 TABLET ORAL at 21:49

## 2018-10-07 RX ADMIN — NICOTINE POLACRILEX PRN MG: 2 GUM, CHEWING ORAL at 13:06

## 2018-10-07 RX ADMIN — Medication SCH MG: at 21:50

## 2018-10-07 RX ADMIN — ACETAMINOPHEN PRN MG: 325 TABLET ORAL at 20:05

## 2018-10-07 RX ADMIN — CYCLOBENZAPRINE HYDROCHLORIDE PRN MG: 10 TABLET, FILM COATED ORAL at 22:15

## 2018-10-07 RX ADMIN — GABAPENTIN SCH MG: 400 CAPSULE ORAL at 21:50

## 2018-10-07 RX ADMIN — Medication SCH TAB: at 09:54

## 2018-10-07 RX ADMIN — NICOTINE POLACRILEX PRN MG: 2 GUM, CHEWING ORAL at 20:06

## 2018-10-07 RX ADMIN — HYDROXYZINE PAMOATE PRN MG: 50 CAPSULE ORAL at 10:45

## 2018-10-07 RX ADMIN — HYDROXYZINE PAMOATE PRN MG: 50 CAPSULE ORAL at 17:35

## 2018-10-08 RX ADMIN — CYCLOBENZAPRINE HYDROCHLORIDE PRN MG: 10 TABLET, FILM COATED ORAL at 14:19

## 2018-10-08 RX ADMIN — TRAZODONE HYDROCHLORIDE SCH MG: 50 TABLET ORAL at 21:31

## 2018-10-08 RX ADMIN — NICOTINE POLACRILEX PRN MG: 2 GUM, CHEWING ORAL at 12:29

## 2018-10-08 RX ADMIN — HYDROXYZINE PAMOATE PRN MG: 50 CAPSULE ORAL at 14:59

## 2018-10-08 RX ADMIN — CYCLOBENZAPRINE HYDROCHLORIDE PRN MG: 10 TABLET, FILM COATED ORAL at 21:32

## 2018-10-08 RX ADMIN — CYCLOBENZAPRINE HYDROCHLORIDE PRN MG: 10 TABLET, FILM COATED ORAL at 06:20

## 2018-10-08 RX ADMIN — NICOTINE SCH MG: 14 PATCH, EXTENDED RELEASE TRANSDERMAL at 10:02

## 2018-10-08 RX ADMIN — Medication SCH TAB: at 10:01

## 2018-10-08 RX ADMIN — BENZTROPINE MESYLATE SCH MG: 1 TABLET ORAL at 21:32

## 2018-10-08 RX ADMIN — HYDROXYZINE PAMOATE PRN MG: 50 CAPSULE ORAL at 10:03

## 2018-10-08 RX ADMIN — HYDROXYZINE PAMOATE PRN MG: 50 CAPSULE ORAL at 04:16

## 2018-10-08 RX ADMIN — ATOMOXETINE HYDROCHLORIDE SCH MG: 25 CAPSULE ORAL at 10:01

## 2018-10-08 RX ADMIN — GABAPENTIN SCH MG: 400 CAPSULE ORAL at 06:20

## 2018-10-08 RX ADMIN — BENZTROPINE MESYLATE SCH MG: 1 TABLET ORAL at 10:02

## 2018-10-08 RX ADMIN — Medication SCH MG: at 21:31

## 2018-10-08 RX ADMIN — GABAPENTIN SCH MG: 400 CAPSULE ORAL at 21:31

## 2018-10-08 RX ADMIN — GABAPENTIN SCH MG: 400 CAPSULE ORAL at 14:19

## 2018-10-08 RX ADMIN — NICOTINE POLACRILEX PRN MG: 2 GUM, CHEWING ORAL at 14:20

## 2018-10-09 RX ADMIN — NICOTINE SCH MG: 14 PATCH, EXTENDED RELEASE TRANSDERMAL at 09:47

## 2018-10-09 RX ADMIN — HYDROXYZINE PAMOATE PRN MG: 50 CAPSULE ORAL at 06:26

## 2018-10-09 RX ADMIN — HYDROXYZINE PAMOATE PRN MG: 50 CAPSULE ORAL at 09:50

## 2018-10-09 RX ADMIN — CYCLOBENZAPRINE HYDROCHLORIDE PRN MG: 10 TABLET, FILM COATED ORAL at 06:26

## 2018-10-09 RX ADMIN — ATOMOXETINE HYDROCHLORIDE SCH MG: 25 CAPSULE ORAL at 09:50

## 2018-10-09 RX ADMIN — Medication SCH MG: at 21:48

## 2018-10-09 RX ADMIN — NICOTINE POLACRILEX PRN MG: 2 GUM, CHEWING ORAL at 12:39

## 2018-10-09 RX ADMIN — IBUPROFEN PRN MG: 600 TABLET, FILM COATED ORAL at 12:38

## 2018-10-09 RX ADMIN — Medication SCH TAB: at 09:50

## 2018-10-09 RX ADMIN — GABAPENTIN SCH MG: 400 CAPSULE ORAL at 21:45

## 2018-10-09 RX ADMIN — IBUPROFEN PRN MG: 600 TABLET, FILM COATED ORAL at 06:25

## 2018-10-09 RX ADMIN — GABAPENTIN SCH MG: 400 CAPSULE ORAL at 13:05

## 2018-10-09 RX ADMIN — CYCLOBENZAPRINE HYDROCHLORIDE PRN MG: 10 TABLET, FILM COATED ORAL at 21:46

## 2018-10-09 RX ADMIN — TRAZODONE HYDROCHLORIDE SCH MG: 50 TABLET ORAL at 21:45

## 2018-10-09 RX ADMIN — BENZTROPINE MESYLATE SCH MG: 1 TABLET ORAL at 09:50

## 2018-10-09 RX ADMIN — GABAPENTIN SCH MG: 400 CAPSULE ORAL at 06:25

## 2018-10-09 RX ADMIN — BENZTROPINE MESYLATE SCH MG: 1 TABLET ORAL at 21:46

## 2018-10-10 VITALS — HEART RATE: 83 BPM | TEMPERATURE: 97.6 F | SYSTOLIC BLOOD PRESSURE: 115 MMHG | DIASTOLIC BLOOD PRESSURE: 76 MMHG

## 2018-10-10 RX ADMIN — CYCLOBENZAPRINE HYDROCHLORIDE PRN MG: 10 TABLET, FILM COATED ORAL at 06:17

## 2018-10-10 RX ADMIN — Medication SCH TAB: at 09:38

## 2018-10-10 RX ADMIN — NICOTINE SCH: 14 PATCH, EXTENDED RELEASE TRANSDERMAL at 09:39

## 2018-10-10 RX ADMIN — BENZTROPINE MESYLATE SCH MG: 1 TABLET ORAL at 09:39

## 2018-10-10 RX ADMIN — HYDROXYZINE PAMOATE PRN MG: 50 CAPSULE ORAL at 06:17

## 2018-10-10 RX ADMIN — IBUPROFEN PRN MG: 600 TABLET, FILM COATED ORAL at 06:17

## 2018-10-10 RX ADMIN — GABAPENTIN SCH MG: 400 CAPSULE ORAL at 06:17

## 2018-10-10 RX ADMIN — ATOMOXETINE HYDROCHLORIDE SCH MG: 25 CAPSULE ORAL at 09:41

## 2018-10-28 VITALS
WEIGHT: 169.98 LBS | TEMPERATURE: 99 F | DIASTOLIC BLOOD PRESSURE: 100 MMHG | HEART RATE: 139 BPM | RESPIRATION RATE: 18 BRPM | SYSTOLIC BLOOD PRESSURE: 156 MMHG | OXYGEN SATURATION: 100 %

## 2018-10-28 LAB
APPEARANCE UR: CLEAR — SIGNIFICANT CHANGE UP
BILIRUB UR-MCNC: NEGATIVE — SIGNIFICANT CHANGE UP
COLOR SPEC: YELLOW — SIGNIFICANT CHANGE UP
DIFF PNL FLD: NEGATIVE — SIGNIFICANT CHANGE UP
GLUCOSE UR QL: NEGATIVE — SIGNIFICANT CHANGE UP
KETONES UR-MCNC: 15 MG/DL
LEUKOCYTE ESTERASE UR-ACNC: NEGATIVE — SIGNIFICANT CHANGE UP
NITRITE UR-MCNC: NEGATIVE — SIGNIFICANT CHANGE UP
PCP SPEC-MCNC: SIGNIFICANT CHANGE UP
PH UR: 6 — SIGNIFICANT CHANGE UP (ref 5–8)
PROT UR-MCNC: NEGATIVE MG/DL — SIGNIFICANT CHANGE UP
SP GR SPEC: 1.02 — SIGNIFICANT CHANGE UP (ref 1–1.03)
UROBILINOGEN FLD QL: 0.2 E.U./DL — SIGNIFICANT CHANGE UP

## 2018-10-28 PROCEDURE — 90792 PSYCH DIAG EVAL W/MED SRVCS: CPT | Mod: GT

## 2018-10-28 RX ADMIN — Medication 1 MILLIGRAM(S): at 23:02

## 2018-10-28 NOTE — ED PROVIDER NOTE - PROGRESS NOTE DETAILS
Per tele-psych, pt endorsed, in addition to previously mentioned caffinated beverages, taking many caffeine pills, sniffing a bag of heroin, smoking K2, and taking approximately 4mg of Xanax. Pt received from BRENDAN Laughlin and Dr. Watson at s/o; pt on psych hold for si and manic episode. Pt re-evaluated by psych and to be admitted for inpt tx. Pt is med cleared for psych admission.

## 2018-10-28 NOTE — ED ADULT NURSE NOTE - NSIMPLEMENTINTERV_GEN_ALL_ED
Implemented All Universal Safety Interventions:  [place] to call system. Call bell, personal items and telephone within reach. Instruct patient to call for assistance. Room bathroom lighting operational. Non-slip footwear when patient is off stretcher. Physically safe environment: no spills, clutter or unnecessary equipment. Stretcher in lowest position, wheels locked, appropriate side rails in place.

## 2018-10-28 NOTE — ED PROVIDER NOTE - MEDICAL DECISION MAKING DETAILS
Bipolar male w/manic features acutely intoxicated on several substances, seen by psych, pending sobriety and re-evaluation to determine whether pt's sx are related to his drug use or a psychotic episode. Currently under 1:1 supervision, given zyprexa/klonopin for mood stabilization. Medical workup is complete pending psych dispo/recommendations. Bipolar male w/manic features acutely intoxicated on several substances, seen by psych, pending sobriety and re-evaluation to determine whether pt's sx are related to his drug use or a psychotic episode. Currently under 1:1 supervision, given zyprexa/klonopin for mood stabilization. Medical workup is complete pending psych dispo/recommendations.  Pt sign out to me pending re eval from psych in the AM.

## 2018-10-28 NOTE — ED PROVIDER NOTE - PHYSICAL EXAMINATION
VITAL SIGNS: I have reviewed nursing notes and confirm.  CONSTITUTIONAL: Well-developed; well-nourished; in no acute distress.  SKIN: Agree with RN documentation regarding decubitus evaluation. Remainder of skin exam is warm and dry, no acute rash.  HEAD: Normocephalic; atraumatic.  EYES: PERRL, EOM intact; conjunctiva and sclera clear.  ENT: No nasal discharge; airway clear.  NECK: Supple; non tender.  CARD: S1, S2 normal; no murmurs, gallops, or rubs. RRR  RESP: No wheezes, rales or rhonchi. CTA w/good excursion  ABD: Normal bowel sounds; soft; non-distended; non-tender  EXT: Normal ROM. No clubbing, cyanosis or edema.  NEURO: Alert, oriented. Grossly unremarkable.  PSYCH: Cooperative, appropriate.

## 2018-10-28 NOTE — ED PROVIDER NOTE - ATTENDING CONTRIBUTION TO CARE
I have seen the pt, reviewed all pertinent clinical data, and I agree with the documentation/care/plan executed by BRENDAN Gayle.

## 2018-10-28 NOTE — ED PROVIDER NOTE - OBJECTIVE STATEMENT
34 y/o M w/hx bipolar disorder last hospitalized 2 yrs ago for manic episode 32 y/o M w/hx bipolar disorder last hospitalized 2 yrs ago for manic episode, previously on zoloft and wellbutrin, now prescribed zyprexa and lamictyl but reporting medical non-compliance, stating that he hasn't taken his meds in "a few days." and is feeling out of control, impulsive, with racing thoughts, thoughts about hurting himself, and in general feeling depressed. He states that he spent all of his money this weekend on [employer] tickets 34 y/o M w/hx bipolar disorder last hospitalized 2 yrs ago for manic episode, previously on zoloft and wellbutrin, now prescribed zyprexa and lamictyl but reporting medical non-compliance, stating that he hasn't taken his meds in "a few days." and is feeling out of control, impulsive, with racing thoughts, thoughts about hurting himself, and in general feeling depressed. He states that he spent all of his money this weekend on lottery tickets, has been drinking upwards of 4 40oz coffees per day with several red bulls, hasn't been able to sleep for days. No AH/VH or HI.

## 2018-10-28 NOTE — ED ADULT TRIAGE NOTE - CHIEF COMPLAINT QUOTE
SI all day, no attempts, no HI, missed his medication for manic depressive dose in am and get suicidal w/ no dose

## 2018-10-28 NOTE — ED PROVIDER NOTE - CARE PLAN
Principal Discharge DX:	Bipolar affective disorder, current episode mixed, current episode severity unspecified  Secondary Diagnosis:	Polysubstance abuse

## 2018-10-29 ENCOUNTER — INPATIENT (INPATIENT)
Facility: HOSPITAL | Age: 33
LOS: 9 days | Discharge: HOME | DRG: 885 | End: 2018-11-08
Attending: PSYCHIATRY & NEUROLOGY | Admitting: PSYCHIATRY & NEUROLOGY
Payer: COMMERCIAL

## 2018-10-29 DIAGNOSIS — F29 UNSPECIFIED PSYCHOSIS NOT DUE TO A SUBSTANCE OR KNOWN PHYSIOLOGICAL CONDITION: ICD-10-CM

## 2018-10-29 DIAGNOSIS — F19.10 OTHER PSYCHOACTIVE SUBSTANCE ABUSE, UNCOMPLICATED: ICD-10-CM

## 2018-10-29 DIAGNOSIS — F11.90 OPIOID USE, UNSPECIFIED, UNCOMPLICATED: ICD-10-CM

## 2018-10-29 DIAGNOSIS — Z78.9 OTHER SPECIFIED HEALTH STATUS: ICD-10-CM

## 2018-10-29 DIAGNOSIS — F15.929 OTHER STIMULANT USE, UNSPECIFIED WITH INTOXICATION, UNSPECIFIED: ICD-10-CM

## 2018-10-29 LAB
ALBUMIN SERPL ELPH-MCNC: 4.3 G/DL — SIGNIFICANT CHANGE UP (ref 3.3–5)
ALBUMIN SERPL ELPH-MCNC: 4.3 G/DL — SIGNIFICANT CHANGE UP (ref 3.3–5)
ALP SERPL-CCNC: 53 U/L — SIGNIFICANT CHANGE UP (ref 40–120)
ALP SERPL-CCNC: 56 U/L — SIGNIFICANT CHANGE UP (ref 40–120)
ALT FLD-CCNC: 53 U/L — HIGH (ref 10–45)
ALT FLD-CCNC: 56 U/L — HIGH (ref 10–45)
ANION GAP SERPL CALC-SCNC: 16 MMOL/L — SIGNIFICANT CHANGE UP (ref 5–17)
ANION GAP SERPL CALC-SCNC: 25 MMOL/L — HIGH (ref 5–17)
APAP SERPL-MCNC: <5 UG/ML — LOW (ref 10–30)
AST SERPL-CCNC: 28 U/L — SIGNIFICANT CHANGE UP (ref 10–40)
AST SERPL-CCNC: 36 U/L — SIGNIFICANT CHANGE UP (ref 10–40)
BASOPHILS NFR BLD AUTO: 0.3 % — SIGNIFICANT CHANGE UP (ref 0–2)
BASOPHILS NFR BLD AUTO: 0.5 % — SIGNIFICANT CHANGE UP (ref 0–2)
BILIRUB SERPL-MCNC: 0.2 MG/DL — SIGNIFICANT CHANGE UP (ref 0.2–1.2)
BILIRUB SERPL-MCNC: <0.2 MG/DL — SIGNIFICANT CHANGE UP (ref 0.2–1.2)
BUN SERPL-MCNC: 15 MG/DL — SIGNIFICANT CHANGE UP (ref 7–23)
BUN SERPL-MCNC: 20 MG/DL — SIGNIFICANT CHANGE UP (ref 7–23)
CALCIUM SERPL-MCNC: 9.5 MG/DL — SIGNIFICANT CHANGE UP (ref 8.4–10.5)
CALCIUM SERPL-MCNC: 9.7 MG/DL — SIGNIFICANT CHANGE UP (ref 8.4–10.5)
CHLORIDE SERPL-SCNC: 101 MMOL/L — SIGNIFICANT CHANGE UP (ref 96–108)
CHLORIDE SERPL-SCNC: 95 MMOL/L — LOW (ref 96–108)
CO2 SERPL-SCNC: 18 MMOL/L — LOW (ref 22–31)
CO2 SERPL-SCNC: 25 MMOL/L — SIGNIFICANT CHANGE UP (ref 22–31)
CREAT SERPL-MCNC: 0.78 MG/DL — SIGNIFICANT CHANGE UP (ref 0.5–1.3)
CREAT SERPL-MCNC: 1.09 MG/DL — SIGNIFICANT CHANGE UP (ref 0.5–1.3)
EOSINOPHIL NFR BLD AUTO: 1.7 % — SIGNIFICANT CHANGE UP (ref 0–6)
EOSINOPHIL NFR BLD AUTO: 3.4 % — SIGNIFICANT CHANGE UP (ref 0–6)
ETHANOL SERPL-MCNC: 49 MG/DL — HIGH (ref 0–10)
GLUCOSE SERPL-MCNC: 156 MG/DL — HIGH (ref 70–99)
GLUCOSE SERPL-MCNC: 96 MG/DL — SIGNIFICANT CHANGE UP (ref 70–99)
HBA1C BLD-MCNC: 5.5 % — SIGNIFICANT CHANGE UP (ref 4–5.6)
HCT VFR BLD CALC: 44.2 % — SIGNIFICANT CHANGE UP (ref 39–50)
HCT VFR BLD CALC: 44.8 % — SIGNIFICANT CHANGE UP (ref 39–50)
HGB BLD-MCNC: 14.8 G/DL — SIGNIFICANT CHANGE UP (ref 13–17)
HGB BLD-MCNC: 15 G/DL — SIGNIFICANT CHANGE UP (ref 13–17)
LYMPHOCYTES # BLD AUTO: 36.3 % — SIGNIFICANT CHANGE UP (ref 13–44)
LYMPHOCYTES # BLD AUTO: 36.8 % — SIGNIFICANT CHANGE UP (ref 13–44)
MCHC RBC-ENTMCNC: 29.8 PG — SIGNIFICANT CHANGE UP (ref 27–34)
MCHC RBC-ENTMCNC: 30 PG — SIGNIFICANT CHANGE UP (ref 27–34)
MCHC RBC-ENTMCNC: 33.5 G/DL — SIGNIFICANT CHANGE UP (ref 32–36)
MCHC RBC-ENTMCNC: 33.5 G/DL — SIGNIFICANT CHANGE UP (ref 32–36)
MCV RBC AUTO: 88.9 FL — SIGNIFICANT CHANGE UP (ref 80–100)
MCV RBC AUTO: 89.6 FL — SIGNIFICANT CHANGE UP (ref 80–100)
MONOCYTES NFR BLD AUTO: 10.3 % — SIGNIFICANT CHANGE UP (ref 2–14)
MONOCYTES NFR BLD AUTO: 11.1 % — SIGNIFICANT CHANGE UP (ref 2–14)
NEUTROPHILS NFR BLD AUTO: 48.2 % — SIGNIFICANT CHANGE UP (ref 43–77)
NEUTROPHILS NFR BLD AUTO: 51.4 % — SIGNIFICANT CHANGE UP (ref 43–77)
PLATELET # BLD AUTO: 308 K/UL — SIGNIFICANT CHANGE UP (ref 150–400)
PLATELET # BLD AUTO: 323 K/UL — SIGNIFICANT CHANGE UP (ref 150–400)
POTASSIUM SERPL-MCNC: 3.8 MMOL/L — SIGNIFICANT CHANGE UP (ref 3.5–5.3)
POTASSIUM SERPL-MCNC: 4.3 MMOL/L — SIGNIFICANT CHANGE UP (ref 3.5–5.3)
POTASSIUM SERPL-SCNC: 3.8 MMOL/L — SIGNIFICANT CHANGE UP (ref 3.5–5.3)
POTASSIUM SERPL-SCNC: 4.3 MMOL/L — SIGNIFICANT CHANGE UP (ref 3.5–5.3)
PROT SERPL-MCNC: 6.8 G/DL — SIGNIFICANT CHANGE UP (ref 6–8.3)
PROT SERPL-MCNC: 7.3 G/DL — SIGNIFICANT CHANGE UP (ref 6–8.3)
RBC # BLD: 4.97 M/UL — SIGNIFICANT CHANGE UP (ref 4.2–5.8)
RBC # BLD: 5 M/UL — SIGNIFICANT CHANGE UP (ref 4.2–5.8)
RBC # FLD: 14.6 % — SIGNIFICANT CHANGE UP (ref 10.3–16.9)
RBC # FLD: 14.7 % — SIGNIFICANT CHANGE UP (ref 10.3–16.9)
SALICYLATES SERPL-MCNC: <0.3 MG/DL — LOW (ref 2.8–20)
SODIUM SERPL-SCNC: 138 MMOL/L — SIGNIFICANT CHANGE UP (ref 135–145)
SODIUM SERPL-SCNC: 142 MMOL/L — SIGNIFICANT CHANGE UP (ref 135–145)
WBC # BLD: 11.2 K/UL — HIGH (ref 3.8–10.5)
WBC # BLD: 15.5 K/UL — HIGH (ref 3.8–10.5)
WBC # FLD AUTO: 11.2 K/UL — HIGH (ref 3.8–10.5)
WBC # FLD AUTO: 15.5 K/UL — HIGH (ref 3.8–10.5)

## 2018-10-29 PROCEDURE — 99231 SBSQ HOSP IP/OBS SF/LOW 25: CPT

## 2018-10-29 PROCEDURE — 99285 EMERGENCY DEPT VISIT HI MDM: CPT | Mod: 25

## 2018-10-29 RX ORDER — NICOTINE POLACRILEX 2 MG
1 GUM BUCCAL ONCE
Qty: 0 | Refills: 0 | Status: COMPLETED | OUTPATIENT
Start: 2018-10-29 | End: 2018-10-29

## 2018-10-29 RX ORDER — GABAPENTIN 400 MG/1
600 CAPSULE ORAL ONCE
Qty: 0 | Refills: 0 | Status: COMPLETED | OUTPATIENT
Start: 2018-10-29 | End: 2018-10-29

## 2018-10-29 RX ORDER — OLANZAPINE 15 MG/1
15 TABLET, FILM COATED ORAL DAILY
Qty: 0 | Refills: 0 | Status: DISCONTINUED | OUTPATIENT
Start: 2018-10-29 | End: 2018-10-30

## 2018-10-29 RX ORDER — OLANZAPINE 15 MG/1
10 TABLET, FILM COATED ORAL ONCE
Qty: 0 | Refills: 0 | Status: COMPLETED | OUTPATIENT
Start: 2018-10-29 | End: 2018-10-29

## 2018-10-29 RX ORDER — GABAPENTIN 400 MG/1
300 CAPSULE ORAL THREE TIMES A DAY
Qty: 0 | Refills: 0 | Status: DISCONTINUED | OUTPATIENT
Start: 2018-10-29 | End: 2018-11-04

## 2018-10-29 RX ORDER — CLONAZEPAM 1 MG
1 TABLET ORAL ONCE
Qty: 0 | Refills: 0 | Status: DISCONTINUED | OUTPATIENT
Start: 2018-10-29 | End: 2018-10-29

## 2018-10-29 RX ORDER — OLANZAPINE 15 MG/1
15 TABLET, FILM COATED ORAL ONCE
Qty: 0 | Refills: 0 | Status: COMPLETED | OUTPATIENT
Start: 2018-10-29 | End: 2018-10-29

## 2018-10-29 RX ORDER — NICOTINE POLACRILEX 2 MG
1 GUM BUCCAL DAILY
Qty: 0 | Refills: 0 | Status: DISCONTINUED | OUTPATIENT
Start: 2018-10-29 | End: 2018-11-08

## 2018-10-29 RX ADMIN — OLANZAPINE 10 MILLIGRAM(S): 15 TABLET, FILM COATED ORAL at 01:56

## 2018-10-29 RX ADMIN — Medication 1 MILLIGRAM(S): at 21:10

## 2018-10-29 RX ADMIN — Medication 1 MILLIGRAM(S): at 19:01

## 2018-10-29 RX ADMIN — Medication 1 PATCH: at 10:39

## 2018-10-29 RX ADMIN — OLANZAPINE 15 MILLIGRAM(S): 15 TABLET, FILM COATED ORAL at 10:39

## 2018-10-29 RX ADMIN — GABAPENTIN 300 MILLIGRAM(S): 400 CAPSULE ORAL at 21:10

## 2018-10-29 RX ADMIN — Medication 1 PATCH: at 16:51

## 2018-10-29 RX ADMIN — Medication 1 MILLIGRAM(S): at 02:02

## 2018-10-29 RX ADMIN — GABAPENTIN 300 MILLIGRAM(S): 400 CAPSULE ORAL at 16:51

## 2018-10-29 RX ADMIN — Medication 2 MILLIGRAM(S): at 23:35

## 2018-10-29 RX ADMIN — Medication 1 MILLIGRAM(S): at 08:48

## 2018-10-29 RX ADMIN — GABAPENTIN 600 MILLIGRAM(S): 400 CAPSULE ORAL at 02:59

## 2018-10-29 NOTE — ED BEHAVIORAL HEALTH ASSESSMENT NOTE - CURRENT MEDICATION
zyprexa 10mg daily, lamictal 100mg BID, Strattera 50mg daily, and atarax 50mg daily. States he is supposed to take diltiazem for HTN but is not currently.

## 2018-10-29 NOTE — ED BEHAVIORAL HEALTH ASSESSMENT NOTE - HPI (INCLUDE ILLNESS QUALITY, SEVERITY, DURATION, TIMING, CONTEXT, MODIFYING FACTORS, ASSOCIATED SIGNS AND SYMPTOMS)
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 Patient presents today for suicidal ideation in the context of forgetting to take his psych meds this AM and using multiple substances throughout the day.     Patient seen and evaluated. Patient states he is here because he "forgot" to take his psych medications today including zyprexa 10mg daily, lamictal 100mg BID, Strattera 50mg daily, and atarax 50mg daily. He states that in lieu of taking his medications, he used multiple substances including caffeine (four 40 oz coffees, 1 large red bull, 5 caffeine pills- states he usually only has 2 small coffees per day), 2 packs cigarettes (reports usually 7-8 cigs per day), smoked K2, sniffed one bag heroin, took 4 sticks of xanax (8 mg total), took 30mg methadone, and had two 24 oz beers (BAL 78 on arrival). He states that he had large amounts of caffeine today to "get a high like cocaine, because it hits the same receptors". He reports in the context of substance use he has been feeling "depressed, paranoid, and suicidal". Patient states that he has been worried that a stranger has been following him around all day for unknown reasons. He reports chronic AH of "whispers", but states today he started hearing commands such as "look over there, there's a $100 bill". He denies the CAH to be harmful towards himself or anyone else. He denies VH. He denies HI/I/P or aggressive I/I/P. He reports suicidal ideation with thought to overdose on heroin, however states that he only has suicidal thoughts when he does not take his medications. He believes that after receiving his medications tonight, that the suicidal ideation will resolve. He denies intent or plan to follow through on his suicidal thoughts at this time, and instead decided to seek help in the ED tonight. Patient states that he was at [facility] earlier today but left before being seen because "the doctor was taking too long" (later states he was seen by a doctor and given Haldol before leaving- unclear whether pt was discharged or eloped). He reports poor sleep for the past week (on avg 4 hrs per night) with elevated energy level and rapid speech. He states he has not been sleeping much this week because he has been "trying to hit up women and get their phone numbers". He reports that he spent all his money this week on drugs and lottery tickets. He denies change in appetite.     Patient states that usually his mother holds on to his medications and gives him one week supply at a time (lives in [place] but meets patient in the city weekly). He states that he just saw his mother yesterday and she would be a good source of collateral info. Attempt made to contact pt's mother, Arnel Paige (last seen yesterday), at [phone]. VM received and message was left requesting call back.    Psyckes: Diagnoses: of stimulant use, opioid use, other psychostimulant use, cocaine use, alcohol use - along with unspecified depressive/bipolar/psychosis; multiple psych admissions along with ER visits without admissions. 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 Patient presents today for suicidal ideation in the context of forgetting to take his psych meds this AM and using multiple substances throughout the day.     Patient seen and evaluated. Patient states he is here because he "forgot" to take his psych medications today including zyprexa 10mg daily, lamictal 100mg BID, Strattera 50mg daily, and atarax 50mg daily. He states that in lieu of taking his medications, he used multiple substances including caffeine (four 40 oz coffees, 1 large red bull, 5 caffeine pills- states he usually only has 2 small coffees per day), 2 packs cigarettes (reports usually 7-8 cigs per day), smoked K2, sniffed one bag heroin, took 4 sticks of xanax (8 mg total), took 30mg methadone, and had two 24 oz beers (BAL 78 on arrival). He states that he had large amounts of caffeine today to "get a high like cocaine, because it hits the same receptors". He reports in the context of substance use he has been feeling "depressed, paranoid, and suicidal". Patient states that he has been worried that a stranger has been following him around all day for unknown reasons. He reports chronic AH of "whispers", but states today he started hearing commands such as "look over there, there's a $100 bill". He denies the CAH to be harmful towards himself or anyone else. He denies VH. He denies HI/I/P or aggressive I/I/P. He reports suicidal ideation with thought to overdose on heroin, however states that he only has suicidal thoughts when he does not take his medications. He believes that after receiving his medications tonight, that the suicidal ideation will resolve. He denies intent or plan to follow through on his suicidal thoughts at this time, and instead decided to seek help in the ED tonight. Patient states that he was at [facility] earlier today but left before being seen because "the doctor was taking too long" (later states he was seen by a doctor and given Haldol before leaving- unclear whether pt was discharged or eloped). He reports poor sleep for the past week (on avg 4 hrs per night) with elevated energy level and rapid speech. He states he has not been sleeping much this week because he has been "trying to hit up women and get their phone numbers". He reports that he spent all his money this week on drugs and lottery tickets. He denies change in appetite.     Patient states that usually his mother holds on to his medications and gives him one week supply at a time (lives in [place] but meets patient in the city weekly). He states that he just saw his mother yesterday and she would be a good source of collateral info. Attempt made to contact pt's mother, Arnel Paige (last seen yesterday), at [phone]. VM received and message was left requesting call back.    Psyckes: Diagnoses: of stimulant use, opioid use, other psychostimulant use, cocaine use, alcohol use - along with unspecified depressive/bipolar/psychosis; multiple psych admissions along with ER visits without admissions. 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 Patient presents today for suicidal ideation in the context of forgetting to take his psych meds this AM and using multiple substances throughout the day.     Patient seen and evaluated. Patient states he is here because he "forgot" to take his psych medications today including zyprexa 10mg daily, lamictal 100mg BID, Strattera 50mg daily, and atarax 50mg daily. He states that in lieu of taking his medications, he used multiple substances including caffeine (four 40 oz coffees, 1 large red bull, 5 caffeine pills- states he usually only has 2 small coffees per day), 2 packs cigarettes (reports usually 7-8 cigs per day), smoked K2, sniffed one bag heroin, took 4 sticks of xanax (8 mg total), took 30mg methadone, and had two 24 oz beers (BAL 78 on arrival). He states that he had large amounts of caffeine today to "get a high like cocaine, because it hits the same receptors". He reports in the context of substance use he has been feeling "depressed, paranoid, and suicidal". Patient states that he has been worried that a stranger has been following him around all day for unknown reasons. He reports chronic AH of "whispers", but states today he started hearing commands such as "look over there, there's a $100 bill". He denies the CAH to be harmful towards himself or anyone else. He denies visual or tactile hallucinations. He denies HI/I/P or aggressive I/I/P. He reports suicidal ideation with thought to overdose on heroin, however states that he only has suicidal thoughts when he does not take his medications. He believes that after receiving his medications tonight, that the suicidal ideation will resolve. He denies intent or plan to follow through on his suicidal thoughts at this time, and instead decided to seek help in the ED tonight. Patient states that he was at [facility] earlier today but left before being seen because "the doctor was taking too long" (later states he was seen by a doctor and given Haldol before leaving- unclear whether pt was discharged or eloped). He reports poor sleep for the past week (on avg 4 hrs per night) with elevated energy level and rapid speech. He states he has not been sleeping much this week because he has been "trying to hit up women and get their phone numbers". He reports that he spent all his money this week on drugs and lottery tickets. He denies change in appetite.     Patient states that usually his mother holds on to his medications and gives him one week supply at a time (lives in [place] but meets patient in the city weekly). He states that he just saw his mother yesterday and she would be a good source of collateral info. Attempt made to contact pt's mother, Arnel Paige (last seen yesterday), at [phone]. VM received and message was left requesting call back.    Psyckes: Diagnoses: of stimulant use, opioid use, other psychostimulant use, cocaine use, alcohol use - along with unspecified depressive/bipolar/psychosis; multiple psych admissions along with ER visits without admissions.

## 2018-10-29 NOTE — ED BEHAVIORAL HEALTH ASSESSMENT NOTE - DESCRIPTION
Per nurse Quintin: Patient arrived stating he forgot his dose of medications. States he gets suicidal ideation when forgets to take it. Had ID band from [employer] on wrist. States he wants his meds and wants to leave. Cooperative with staff and evaluation process. Currently calm, on 1:1 CO. Received PO meds as below. Did not require involuntary IM PRNs or restraints.     BAL 78 on arrival. Utox + benzo and opiates. Negative for methadone.     Vital Signs Last 24 Hrs  T(C): 37 (28 Oct 2018 22:17), Max: 37 (28 Oct 2018 22:17)  T(F): 98.6 (28 Oct 2018 22:17), Max: 98.6 (28 Oct 2018 22:17)  HR: 124 (28 Oct 2018 23:55) (124 - 139)  BP: 141/91 (28 Oct 2018 23:55) (141/91 - 156/100)  BP(mean): --  RR: 18 (28 Oct 2018 22:17) (18 - 18)  SpO2: 100% (28 Oct 2018 22:17) (100% - 100%) reports history of seizures, HTN see HPI Per RN Kirill: Patient arrived stating he forgot his dose of medications. States he gets suicidal ideation when forgets to take it. Had ID band from [employer] on wrist. States he wants his meds and wants to leave. Cooperative with staff and evaluation process. Currently calm, on 1:1 CO. Received PO meds as below. Did not require involuntary IM PRNs or restraints.     BAL 78 on arrival. Utox + benzo and opiates. Negative for methadone.     Vital Signs Last 24 Hrs  T(C): 37 (28 Oct 2018 22:17), Max: 37 (28 Oct 2018 22:17)  T(F): 98.6 (28 Oct 2018 22:17), Max: 98.6 (28 Oct 2018 22:17)  HR: 124 (28 Oct 2018 23:55) (124 - 139)  BP: 141/91 (28 Oct 2018 23:55) (141/91 - 156/100)  BP(mean): --  RR: 18 (28 Oct 2018 22:17) (18 - 18)  SpO2: 100% (28 Oct 2018 22:17) (100% - 100%) Per RN Kirill: Patient arrived stating he forgot his dose of medications. States he gets suicidal ideation when forgets to take it. Had ID band from [employer] on wrist. States he wants his meds and wants to leave. Cooperative with staff and evaluation process. Currently calm, on 1:1 CO. Received PO meds as below. Did not require involuntary IM PRNs or restraints.     BAL 78 on arrival. Utox + benzo and opiates. Negative for methadone (can take up to 8 hrs after ingestion to turn + in UTox)    Vital Signs Last 24 Hrs  T(C): 37 (28 Oct 2018 22:17), Max: 37 (28 Oct 2018 22:17)  T(F): 98.6 (28 Oct 2018 22:17), Max: 98.6 (28 Oct 2018 22:17)  HR: 124 (28 Oct 2018 23:55) (124 - 139)  BP: 141/91 (28 Oct 2018 23:55) (141/91 - 156/100)  BP(mean): --  RR: 18 (28 Oct 2018 22:17) (18 - 18)  SpO2: 100% (28 Oct 2018 22:17) (100% - 100%)

## 2018-10-29 NOTE — ED BEHAVIORAL HEALTH ASSESSMENT NOTE - MEDICAL ISSUES AND PLAN (INCLUDE STANDING AND PRN MEDICATION)
Per EM attending. Advised to f/u regarding pt's complaint of abdominal rash Per EM attending. Advised to f/u regarding pt's complaint of abdominal rash.

## 2018-10-29 NOTE — ED BEHAVIORAL HEALTH ASSESSMENT NOTE - DETAILS
Reports past attempts. See HPI for most recent attempt. Also states over 2 years ago "took Tylenol" but no medical treatment, reports "heroin overdose" and "trying to hang self" in Jan 2017 Reports past history of DT/seizures (reports medical history of seizures) "restless legs" on Risperdal- had to go to ED for cogentin IM. chest pain, tachycardia rash on abdomen see HPI self referred EM provider aware

## 2018-10-29 NOTE — ED BEHAVIORAL HEALTH ASSESSMENT NOTE - PSYCHIATRIC ISSUES AND PLAN (INCLUDE STANDING AND PRN MEDICATION)
Will give one time dose of zyprexa 10mg and klonopin 1mg PO. Hold lamictal given complaint of abdominal rash.  Ativan 2mg PO/IM q2h PRN severe agitation/sedative withdrawal symptoms. Will re-evaluate in AM.

## 2018-10-29 NOTE — ED BEHAVIORAL HEALTH ASSESSMENT NOTE - SUMMARY
Patient is a 31yo Irish M, single, domiciled at [facility], with reported PPhx of bipolar disorder, ADHD, ETOH abuse (Hx w/d symptoms including seizures), cocaine abuse, opiate abuse, K2 abuse, 20+ prior inpt psych admission most recently [place] last month s/p reported suicide attempt (OD on "20,000mg neurontin and 5,000mg trazodone"), denies hx of violence, denies current legal problems, reports active use of multiple substances, and PMhx of HTN. Patient presents today for suicidal ideation in the context of forgetting to take his psych meds this AM and using multiple substances throughout the day including excessive amounts of caffeine, cigarettes, K2, heroin, 4 sticks of xanax (8 mg total), 30mg methadone, and two 24 oz beers (BAL 78 on arrival). Patient reports symptoms of depressed and anxious mood, paranoia, decreased need for sleep, rapid speech, PMA, CAH, and suicidal ideation with loosely formulated plan and no intent. At this time the cause of pt's symptoms are unclear (jesus vs substance intoxication). Patient requires further evaluation in the AM after metabolizing substances. Patient is a 33yo Kyrgyz M, single, domiciled at [facility], with reported PPhx of bipolar disorder, ADHD, ETOH abuse (Hx w/d symptoms including seizures), cocaine abuse, opiate abuse, K2 abuse, 20+ prior inpt psych admission most recently [place] last month s/p reported suicide attempt (OD on "20,000mg neurontin and 5,000mg trazodone"), denies hx of violence, denies current legal problems, reports active use of multiple substances, and PMhx of HTN. Patient presents today for suicidal ideation in the context of forgetting to take his psych meds this AM and using multiple substances throughout the day including excessive amounts of caffeine, cigarettes, K2, heroin, 4 sticks of xanax (8 mg total), 30mg methadone, and two 24 oz beers (BAL 78 on arrival). Patient reports symptoms of depressed and anxious mood, paranoia, decreased need for sleep, rapid speech, PMA, CAH, and suicidal ideation with loosely formulated plan and no intent. At this time the cause of pt's symptoms are unclear (jesus with psychosis vs substance intoxication). Patient requires further evaluation in the AM after metabolizing substances. Patient is a 32yo Maltese M, single, domiciled at [facility], with reported PPhx of bipolar disorder, ADHD, ETOH abuse (Hx w/d symptoms including seizures), cocaine abuse, opiate abuse, K2 abuse, 20+ prior inpt psych admission most recently [place] last month s/p reported suicide attempt (OD on "20,000mg neurontin and 5,000mg trazodone"), denies hx of violence, denies current legal problems, reports active use of multiple substances, and PMhx of HTN. Patient presents today for suicidal ideation in the context of forgetting to take his psych meds this AM and using multiple substances throughout the day including excessive amounts of caffeine, cigarettes, K2, heroin, 4 sticks of xanax (8 mg total), 30mg methadone, and two 24 oz beers (BAL 78 on arrival). Patient reports symptoms of depressed and anxious mood, paranoia, decreased need for sleep, rapid speech, PMA, CAH, and suicidal ideation with loosely formulated plan and no intent. At this time the cause of pt's symptoms are unclear (jesus with psychosis vs substance intoxication). Patient requires further evaluation in the AM after metabolizing substances.

## 2018-10-29 NOTE — ED ADULT NURSE REASSESSMENT NOTE - NS ED NURSE REASSESS COMMENT FT1
received pt in no acute distress. pt remains on 1:1. pending re-evaluation for psych. safety maintained. will continue to monitor.

## 2018-10-29 NOTE — ED BEHAVIORAL HEALTH ASSESSMENT NOTE - OTHER PAST PSYCHIATRIC HISTORY (INCLUDE DETAILS REGARDING ONSET, COURSE OF ILLNESS, INPATIENT/OUTPATIENT TREATMENT)
see HPI. Patient reports he is currently in treatment with Psych NP Mr. Zapata (cannot remember first name).  States his pharmacy is [facility] in [place], [place] (delivers meds to his mom).

## 2018-10-29 NOTE — ED ADULT NURSE REASSESSMENT NOTE - NS ED NURSE REASSESS COMMENT FT1
pt in no acute distress. asleep in bed. pt remains on 1:1. safety maintained. will continue to monitor.

## 2018-10-29 NOTE — ED BEHAVIORAL HEALTH NOTE - BEHAVIORAL HEALTH NOTE
32 YO M c self-reported bipolar d/o type I and ADHD with polysubstance (prior to admission used etoh (two 24 ouncers-BAL 78 in ED), loads of caffeine boosted products, xanax(4 sticks), heroin, methadone(30 mg), and K2) use d/o (also has a hx of cocaine use), and HTN and came to ED with SI. He was hearing whispers telling him to kill himself but he says he has no plan and no intent. He had not used his psych meds (zyprexa, lamictal, atarax, and strattera) and felt worse as he used more substances. He has been sleeping 4 hours or less, speaking quickly, spending all his money on drugs and lottery tickets. Smoking 2 PPD. +PI with worries that a stranger has been following him. Elevated, increased energry, +PMA. Asking for medication to help him calm down such as zyprexa or neurontin. Agreed to take zyprexa 15 mg PO. Has a history of etoh wdl seizures. Prior to eval at [facility] he went to [place] ED but unclear if he was discharged or possibly eloped. +hypersexual. Has a history of overdose on neurontin and trazodone. Living at [facility] on [address] Spoke to his mother Arnel Paige (limited collateral information obtained due to pt's mother;s poor English., tried to call back with  but could not get through on that occasion) who said he has been suicidal and talking about killing himself in the past week but she does not know of him being bipolar. Pt last got meds from [facility] ([facility] He agrees to admission and signed voluntary papers.     MSE-Disheveled, overly related, good EC. +PMA. Eating voraciously. Speech: rapid Mood: "I need medication." Affect: elevated, inappropriate. TP: circumstantial TC: +SI without plan or intent, +AH (whispers now, no longer command), -VH, +PI, -HI. +hypersexual, poor sleep, overspending. I&J: poor    1)Can give zyprexa 15 mg now for mood dysregulation and feelings of agitation.     2)[facility] protocol for alcohol withdrawal.     3)Admit to [facility] on voluntary status when bed is available.

## 2018-10-29 NOTE — ED ADULT NURSE REASSESSMENT NOTE - GENERAL PATIENT STATE
resting/sleeping/1:1/anxious/family/SO at bedside family/SO at bedside/anxious/comfortable appearance/resting/sleeping/1:1

## 2018-10-29 NOTE — ED BEHAVIORAL HEALTH ASSESSMENT NOTE - RISK ASSESSMENT
Risk factors include current suicidal ideation, substance intoxication, and noncompliance with treatment. Further safety assessment required in the AM after metabolizing substances.

## 2018-10-29 NOTE — ED ADULT NURSE REASSESSMENT NOTE - NS ED NURSE REASSESS COMMENT FT1
pt is up and about, awaiting reassessment by psych in am pt is up and about, awaiting reassessment by psych in am, no further meds that would cause sedation/drowsiness pt is up and about, awaiting reassessment by psych in am, no further meds that would cause sedation/drowsiness, pt aware.

## 2018-10-29 NOTE — ED BEHAVIORAL HEALTH ASSESSMENT NOTE - SUICIDE RISK FACTORS
Substance abuse/dependence/Global insomnia/Access to means (pills, firearms, etc.)/Agitation/severe anxiety/Mood episode

## 2018-10-29 NOTE — ED BEHAVIORAL HEALTH ASSESSMENT NOTE - DESCRIPTION (FIRST USE, LAST USE, QUANTITY, FREQUENCY, DURATION)
see HPI Reports "almost everyday" with last use today. States he used to drink "1L liquor daily" but recently has been drinking 2-3 large beers daily. Per chart hx of use. sniffed bag of heroin today and used methadone 8mg xanax today caffeine, K2

## 2018-10-30 LAB
CHOLEST SERPL-MCNC: 183 MG/DL — SIGNIFICANT CHANGE UP (ref 10–199)
HDLC SERPL-MCNC: 76 MG/DL — SIGNIFICANT CHANGE UP
LIPID PNL WITH DIRECT LDL SERPL: 73 MG/DL — SIGNIFICANT CHANGE UP
TOTAL CHOLESTEROL/HDL RATIO MEASUREMENT: 2.4 RATIO — LOW (ref 3.4–9.6)
TRIGL SERPL-MCNC: 172 MG/DL — HIGH (ref 10–149)

## 2018-10-30 PROCEDURE — 70450 CT HEAD/BRAIN W/O DYE: CPT | Mod: 26

## 2018-10-30 PROCEDURE — 99223 1ST HOSP IP/OBS HIGH 75: CPT | Mod: GC

## 2018-10-30 PROCEDURE — 99233 SBSQ HOSP IP/OBS HIGH 50: CPT

## 2018-10-30 PROCEDURE — 12345: CPT | Mod: NC

## 2018-10-30 RX ORDER — OLANZAPINE 15 MG/1
7.5 TABLET, FILM COATED ORAL AT BEDTIME
Qty: 0 | Refills: 0 | Status: DISCONTINUED | OUTPATIENT
Start: 2018-10-30 | End: 2018-11-01

## 2018-10-30 RX ORDER — TRAZODONE HCL 50 MG
50 TABLET ORAL AT BEDTIME
Qty: 0 | Refills: 0 | Status: DISCONTINUED | OUTPATIENT
Start: 2018-10-30 | End: 2018-10-31

## 2018-10-30 RX ADMIN — Medication 1 PATCH: at 10:04

## 2018-10-30 RX ADMIN — GABAPENTIN 300 MILLIGRAM(S): 400 CAPSULE ORAL at 05:52

## 2018-10-30 RX ADMIN — GABAPENTIN 300 MILLIGRAM(S): 400 CAPSULE ORAL at 21:04

## 2018-10-30 RX ADMIN — Medication 75 MILLIGRAM(S): at 16:48

## 2018-10-30 RX ADMIN — OLANZAPINE 7.5 MILLIGRAM(S): 15 TABLET, FILM COATED ORAL at 21:04

## 2018-10-30 RX ADMIN — Medication 2 MILLIGRAM(S): at 13:14

## 2018-10-30 RX ADMIN — GABAPENTIN 300 MILLIGRAM(S): 400 CAPSULE ORAL at 13:13

## 2018-10-30 RX ADMIN — OLANZAPINE 15 MILLIGRAM(S): 15 TABLET, FILM COATED ORAL at 11:49

## 2018-10-30 RX ADMIN — Medication 1 PATCH: at 10:01

## 2018-10-30 RX ADMIN — Medication 75 MILLIGRAM(S): at 01:36

## 2018-10-30 RX ADMIN — Medication 0.1 MILLIGRAM(S): at 16:48

## 2018-10-30 RX ADMIN — Medication 50 MILLIGRAM(S): at 22:45

## 2018-10-30 RX ADMIN — Medication 0.1 MILLIGRAM(S): at 10:01

## 2018-10-30 RX ADMIN — Medication 2 MILLIGRAM(S): at 21:04

## 2018-10-30 RX ADMIN — Medication 75 MILLIGRAM(S): at 10:01

## 2018-10-30 RX ADMIN — Medication 0.1 MILLIGRAM(S): at 01:58

## 2018-10-30 NOTE — BEHAVIORAL HEALTH ASSESSMENT NOTE - DETAILS
Reports past attempts. See HPI for most recent attempt. Also states over 2 years ago "took Tylenol" but no medical treatment, reports "heroin overdose" and "trying to hang self" in Jan 2017 Reports past history of DT/seizures (reports medical history of seizures) "restless legs" on Risperdal- had to go to ED for cogentin IM. chest pain, tachycardia rash on abdomen see HPI

## 2018-10-30 NOTE — BEHAVIORAL HEALTH ASSESSMENT NOTE - HPI (INCLUDE ILLNESS QUALITY, SEVERITY, DURATION, TIMING, CONTEXT, MODIFYING FACTORS, ASSOCIATED SIGNS AND SYMPTOMS)
Patient is a 34yo German M, single, noncaregiver, domiciled at 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 Patient presents today for suicidal ideation in the context of forgetting to take his psych meds this AM and using multiple substances throughout the day.     Patient seen and evaluated. Patient states he is here because he "forgot" to take his psych medications today including zyprexa 10mg daily, lamictal 100mg BID, Strattera 50mg daily, and atarax 50mg daily. He states that in lieu of taking his medications, he used multiple substances including caffeine (four 40 oz coffees, 1 large red bull, 5 caffeine pills- states he usually only has 2 small coffees per day), 2 packs cigarettes (reports usually 7-8 cigs per day), smoked K2, sniffed one bag heroin, took 4 sticks of xanax (8 mg total), took 30mg methadone, and had two 24 oz beers (BAL 78 on arrival). He states that he had large amounts of caffeine today to "get a high like cocaine, because it hits the same receptors". He reports in the context of substance use he has been feeling "depressed, paranoid, and suicidal". Patient states that he has been worried that a stranger has been following him around all day for unknown reasons. He reports chronic AH of "whispers", but states today he started hearing commands such as "look over there, there's a $100 bill". He denies the CAH to be harmful towards himself or anyone else. He denies visual or tactile hallucinations. He denies HI/I/P or aggressive I/I/P. He reports suicidal ideation with thought to overdose on heroin, however states that he only has suicidal thoughts when he does not take his medications. He believes that after receiving his medications tonight, that the suicidal ideation will resolve. He denies intent or plan to follow through on his suicidal thoughts at this time, and instead decided to seek help in the ED tonight. Patient states that he was at [place] earlier today but left before being seen because "the doctor was taking too long" (later states he was seen by a doctor and given Haldol before leaving- unclear whether pt was discharged or eloped). He reports poor sleep for the past week (on avg 4 hrs per night) with elevated energy level and rapid speech. He states he has not been sleeping much this week because he has been "trying to hit up women and get their phone numbers". He reports that he spent all his money this week on drugs and lottery tickets. He denies change in appetite.     Patient states that usually his mother holds on to his medications and gives him one week supply at a time (lives in [place] but meets patient in the city weekly). He states that he just saw his mother yesterday and she would be a good source of collateral info. Attempt made to contact pt's mother, Arnel Paige (last seen yesterday), at [phone]. VM received and message was left requesting call back.    Psyckes: Diagnoses: of stimulant use, opioid use, other psychostimulant use, cocaine use, alcohol use - along with unspecified depressive/bipolar/psychosis; multiple psych admissions along with ER visits without admissions.

## 2018-10-30 NOTE — BEHAVIORAL HEALTH ASSESSMENT NOTE - NSBHADMITIPSTRENGTH_PSY_A_CORE
Able to set and pursue goals/Able to exercise self-direction/Knowledge of medications/Awareness of substance use issues/Cooperative with treatment/Motivated and ready for change

## 2018-10-30 NOTE — BEHAVIORAL HEALTH ASSESSMENT NOTE - PROBLEM SELECTOR PLAN 2
Librium taper; Neurontin ; Cloniidine ; see interval and summary data for updates.  Refer for rehab.

## 2018-10-30 NOTE — BEHAVIORAL HEALTH ASSESSMENT NOTE - SUMMARY
Patient is a 32yo Icelandic M, single, domiciled at [facility], with reported PPhx of bipolar disorder, ADHD, ETOH abuse (Hx w/d symptoms including seizures), cocaine abuse, opiate abuse, K2 abuse, 20+ prior inpt psych admission most recently [place] last month s/p reported suicide attempt (OD on "20,000mg neurontin and 5,000mg trazodone"), denies hx of violence, denies current legal problems, reports active use of multiple substances, and PMhx of HTN. Patient presents today for suicidal ideation in the context of forgetting to take his psych meds this AM and using multiple substances throughout the day including excessive amounts of caffeine, cigarettes, K2, heroin, 4 sticks of xanax (8 mg total), 30mg methadone, and two 24 oz beers (BAL 78 on arrival). Patient reports symptoms of depressed and anxious mood, paranoia, decreased need for sleep, rapid speech, PMA, CAH, and suicidal ideation with loosely formulated plan and no intent. At this time the cause of pt's symptoms are unclear (jesus with psychosis vs substance intoxication). Patient requires further evaluation in the AM after metabolizing substances.

## 2018-10-31 DIAGNOSIS — F31.60 BIPOLAR DISORDER, CURRENT EPISODE MIXED, UNSPECIFIED: ICD-10-CM

## 2018-10-31 DIAGNOSIS — F19.10 OTHER PSYCHOACTIVE SUBSTANCE ABUSE, UNCOMPLICATED: ICD-10-CM

## 2018-10-31 PROCEDURE — 99232 SBSQ HOSP IP/OBS MODERATE 35: CPT

## 2018-10-31 PROCEDURE — 99233 SBSQ HOSP IP/OBS HIGH 50: CPT | Mod: GC

## 2018-10-31 RX ORDER — FOLIC ACID 0.8 MG
1 TABLET ORAL DAILY
Qty: 0 | Refills: 0 | Status: DISCONTINUED | OUTPATIENT
Start: 2018-10-31 | End: 2018-11-08

## 2018-10-31 RX ORDER — THIAMINE MONONITRATE (VIT B1) 100 MG
100 TABLET ORAL DAILY
Qty: 0 | Refills: 0 | Status: DISCONTINUED | OUTPATIENT
Start: 2018-10-31 | End: 2018-11-08

## 2018-10-31 RX ADMIN — Medication 1 MILLIGRAM(S): at 11:41

## 2018-10-31 RX ADMIN — GABAPENTIN 300 MILLIGRAM(S): 400 CAPSULE ORAL at 06:21

## 2018-10-31 RX ADMIN — Medication 2 MILLIGRAM(S): at 09:59

## 2018-10-31 RX ADMIN — Medication 100 MILLIGRAM(S): at 11:41

## 2018-10-31 RX ADMIN — Medication 2 MILLIGRAM(S): at 19:00

## 2018-10-31 RX ADMIN — GABAPENTIN 300 MILLIGRAM(S): 400 CAPSULE ORAL at 14:09

## 2018-10-31 RX ADMIN — Medication 100 MILLIGRAM(S): at 21:31

## 2018-10-31 RX ADMIN — Medication 2 MILLIGRAM(S): at 06:22

## 2018-10-31 RX ADMIN — Medication 1 PATCH: at 10:01

## 2018-10-31 RX ADMIN — Medication 0.1 MILLIGRAM(S): at 07:58

## 2018-10-31 RX ADMIN — OLANZAPINE 7.5 MILLIGRAM(S): 15 TABLET, FILM COATED ORAL at 21:31

## 2018-10-31 RX ADMIN — Medication 1 TABLET(S): at 13:11

## 2018-10-31 RX ADMIN — Medication 2 MILLIGRAM(S): at 23:41

## 2018-10-31 RX ADMIN — Medication 0.1 MILLIGRAM(S): at 17:47

## 2018-10-31 RX ADMIN — Medication 1 PATCH: at 11:35

## 2018-10-31 RX ADMIN — Medication 25 MILLIGRAM(S): at 11:41

## 2018-10-31 RX ADMIN — Medication 2 MILLIGRAM(S): at 15:45

## 2018-10-31 RX ADMIN — Medication 100 MILLIGRAM(S): at 14:08

## 2018-10-31 RX ADMIN — Medication 75 MILLIGRAM(S): at 07:58

## 2018-10-31 RX ADMIN — GABAPENTIN 300 MILLIGRAM(S): 400 CAPSULE ORAL at 21:31

## 2018-10-31 NOTE — PROGRESS NOTE BEHAVIORAL HEALTH - NSBHFUPINTERVALCCFT_PSY_A_CORE
very med focused; wants to continue Librium and no mention of any sleep appetite or pain issues.  Later fixated on when he is taking Zyprexa (at night).  states that had difficulty falling asleep; reports good appetite; wants a sleep aide; wants treatment in the hospital; no complaints of pain.

## 2018-10-31 NOTE — PROGRESS NOTE BEHAVIORAL HEALTH - NSBHFUPINTERVALHXFT_PSY_A_CORE
staff notes tendency to exert self prior to bp measurements.  MSE: comes to groups; fair eye contact; appears disheveled.  clean.  spends time in the hallway.  alert, oriented, cognition intact; oriented x3;fund of knowlege intact; registers 3/3 ; recalls 3/3;; EOMS full; tongue protrusion wnl; identifies 2 fingers (tested on 10/30) ; denies AVH or s/h i/i/p; speech normal volume, spontaneous, clear; normal gait.  no evidence of tremor or rigidity.   complains of anxiety;anxious; affect generally thought congruent; tc/tp no peculiarities of language use; rather evasive and somewhat guarded; medication seeking; i&j: fair to poor.  accepts treatment; however not reflective on sxs or situation. staff notes tendency to exert self prior to bp measurements.  MSE: comes to groups; fair eye contact; appears disheveled.  clean.  spends time in the hallway.  alert, oriented, cognition intact; oriented x3;fund of knowledge intact; registers 3/3 ; recalls 3/3;; EOMS full; tongue protrusion wnl; identifies 2 fingers (tested on 10/30) ; denies AVH or s/h i/i/p; speech normal volume, spontaneous, clear; normal gait.  no evidence of tremor or rigidity.   complains of anxiety; anxious; affect generally thought congruent; tc/tp no peculiarities of language use; rather evasive and somewhat guarded; medication seeking; i&j: fair to poor.  accepts treatment; however not reflective on sxs or situation.
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
## 2018-11-01 DIAGNOSIS — I10 ESSENTIAL (PRIMARY) HYPERTENSION: ICD-10-CM

## 2018-11-01 PROCEDURE — 99232 SBSQ HOSP IP/OBS MODERATE 35: CPT

## 2018-11-01 PROCEDURE — 99233 SBSQ HOSP IP/OBS HIGH 50: CPT | Mod: GC

## 2018-11-01 RX ORDER — HYDROXYZINE HCL 10 MG
50 TABLET ORAL EVERY 6 HOURS
Qty: 0 | Refills: 0 | Status: DISCONTINUED | OUTPATIENT
Start: 2018-11-01 | End: 2018-11-04

## 2018-11-01 RX ORDER — NICOTINE POLACRILEX 2 MG
2 GUM BUCCAL
Qty: 0 | Refills: 0 | Status: DISCONTINUED | OUTPATIENT
Start: 2018-11-01 | End: 2018-11-08

## 2018-11-01 RX ORDER — OLANZAPINE 15 MG/1
10 TABLET, FILM COATED ORAL AT BEDTIME
Qty: 0 | Refills: 0 | Status: DISCONTINUED | OUTPATIENT
Start: 2018-11-01 | End: 2018-11-08

## 2018-11-01 RX ADMIN — Medication 2 MILLIGRAM(S): at 18:20

## 2018-11-01 RX ADMIN — Medication 1 PATCH: at 18:15

## 2018-11-01 RX ADMIN — Medication 2 MILLIGRAM(S): at 15:04

## 2018-11-01 RX ADMIN — Medication 2 MILLIGRAM(S): at 21:43

## 2018-11-01 RX ADMIN — Medication 100 MILLIGRAM(S): at 21:42

## 2018-11-01 RX ADMIN — Medication 2 MILLIGRAM(S): at 01:41

## 2018-11-01 RX ADMIN — Medication 1 PATCH: at 11:07

## 2018-11-01 RX ADMIN — Medication 0.1 MILLIGRAM(S): at 18:20

## 2018-11-01 RX ADMIN — Medication 50 MILLIGRAM(S): at 22:58

## 2018-11-01 RX ADMIN — Medication 50 MILLIGRAM(S): at 15:28

## 2018-11-01 RX ADMIN — Medication 0.1 MILLIGRAM(S): at 10:33

## 2018-11-01 RX ADMIN — Medication 1 PATCH: at 11:06

## 2018-11-01 RX ADMIN — GABAPENTIN 300 MILLIGRAM(S): 400 CAPSULE ORAL at 06:15

## 2018-11-01 RX ADMIN — GABAPENTIN 300 MILLIGRAM(S): 400 CAPSULE ORAL at 13:13

## 2018-11-01 RX ADMIN — OLANZAPINE 10 MILLIGRAM(S): 15 TABLET, FILM COATED ORAL at 21:43

## 2018-11-01 RX ADMIN — Medication 1 TABLET(S): at 10:33

## 2018-11-01 RX ADMIN — Medication 1 MILLIGRAM(S): at 10:33

## 2018-11-01 RX ADMIN — Medication 100 MILLIGRAM(S): at 13:13

## 2018-11-01 RX ADMIN — GABAPENTIN 300 MILLIGRAM(S): 400 CAPSULE ORAL at 21:42

## 2018-11-01 RX ADMIN — Medication 100 MILLIGRAM(S): at 10:33

## 2018-11-01 NOTE — PROGRESS NOTE BEHAVIORAL HEALTH - NSBHFUPINTERVALCCFT_PSY_A_CORE
discussed medication strategy;  will allow Atarax for anxiety; taper down on Librium to 50mg at 1pm and 100mg at 9pm with Clonidine 0.1mg at 9am and 5pm; will raise Zyprexa to 10mg at night; raise Neurontin at 200mg po TID;  patient will go to groups and  focus on rehab issues.  appetite good. no pain complaints.

## 2018-11-01 NOTE — PROGRESS NOTE BEHAVIORAL HEALTH - NSBHFUPINTERVALHXFT_PSY_A_CORE
staff notes tendency to exert self prior to bp measurements.  MSE: comes to groups; fair eye contact; appears disheveled.  clean.  spends time in the hallway.  alert, oriented, cognition intact; oriented ; no   AVH or s/h i/i/p; speech normal volume, spontaneous, clear; normal gait.  no evidence of tremor or rigidity.   medication focused and seeking;  somewhat  anxious; affect  thought congruent; tc/tp no peculiarities of language use; rather evasive and somewhat guarded; medication seeking; i&j: fair to poor.  accepts treatment; however not reflective on sxs or situation. staff notes tendency to exert self prior to bp measurements.  MSE: comes to groups; fair eye contact; appears disheveled.  clean.  spends time in the hallway.  alert, oriented, cognition intact; oriented ; no   AVH or s/h i/i/p; speech normal volume, spontaneous, clear; normal gait.  no evidence of tremor or rigidity.   medication focused and seeking;  somewhat  anxious; affect  thought congruent; tc/tp no peculiarities of language use; rather evasive and somewhat guarded; medication seeking; i&j: fair to poor.  accepts treatment; however not reflective on sxs or situation.  Started in NRT gum 2mg q 2h prn as requested by patient
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
## 2018-11-01 NOTE — PROGRESS NOTE BEHAVIORAL HEALTH - NSBHCHARTREVIEWVS_PSY_A_CORE FT
Vital Signs Last 24 Hrs  T(C): 36.1 (01 Nov 2018 11:00), Max: 36.8 (31 Oct 2018 15:30)  T(F): 97 (01 Nov 2018 11:00), Max: 98.3 (31 Oct 2018 15:30)  HR: 114 (01 Nov 2018 11:00) (93 - 153)  BP: 136/89 (01 Nov 2018 11:00) (114/74 - 152/96)  BP(mean): --  RR: 18 (01 Nov 2018 11:00) (17 - 20)  SpO2: 98% (31 Oct 2018 23:38) (98% - 98%)

## 2018-11-02 PROCEDURE — 99232 SBSQ HOSP IP/OBS MODERATE 35: CPT

## 2018-11-02 PROCEDURE — 99233 SBSQ HOSP IP/OBS HIGH 50: CPT | Mod: GC

## 2018-11-02 RX ORDER — IBUPROFEN 200 MG
400 TABLET ORAL EVERY 6 HOURS
Qty: 0 | Refills: 0 | Status: DISCONTINUED | OUTPATIENT
Start: 2018-11-02 | End: 2018-11-08

## 2018-11-02 RX ADMIN — Medication 2 MILLIGRAM(S): at 05:51

## 2018-11-02 RX ADMIN — Medication 50 MILLIGRAM(S): at 18:00

## 2018-11-02 RX ADMIN — Medication 1 PATCH: at 11:30

## 2018-11-02 RX ADMIN — Medication 2 MILLIGRAM(S): at 17:57

## 2018-11-02 RX ADMIN — Medication 1 PATCH: at 19:28

## 2018-11-02 RX ADMIN — Medication 0.1 MILLIGRAM(S): at 05:51

## 2018-11-02 RX ADMIN — Medication 1 TABLET(S): at 09:46

## 2018-11-02 RX ADMIN — Medication 1 MILLIGRAM(S): at 09:46

## 2018-11-02 RX ADMIN — GABAPENTIN 300 MILLIGRAM(S): 400 CAPSULE ORAL at 21:26

## 2018-11-02 RX ADMIN — Medication 2 MILLIGRAM(S): at 12:17

## 2018-11-02 RX ADMIN — Medication 2 MILLIGRAM(S): at 21:27

## 2018-11-02 RX ADMIN — Medication 2 MILLIGRAM(S): at 09:46

## 2018-11-02 RX ADMIN — Medication 1 PATCH: at 11:29

## 2018-11-02 RX ADMIN — GABAPENTIN 300 MILLIGRAM(S): 400 CAPSULE ORAL at 05:51

## 2018-11-02 RX ADMIN — OLANZAPINE 10 MILLIGRAM(S): 15 TABLET, FILM COATED ORAL at 21:26

## 2018-11-02 RX ADMIN — Medication 75 MILLIGRAM(S): at 13:20

## 2018-11-02 RX ADMIN — Medication 75 MILLIGRAM(S): at 21:26

## 2018-11-02 RX ADMIN — Medication 2 MILLIGRAM(S): at 22:42

## 2018-11-02 RX ADMIN — Medication 0.1 MILLIGRAM(S): at 16:56

## 2018-11-02 RX ADMIN — GABAPENTIN 300 MILLIGRAM(S): 400 CAPSULE ORAL at 14:46

## 2018-11-02 RX ADMIN — Medication 50 MILLIGRAM(S): at 09:46

## 2018-11-02 RX ADMIN — Medication 2 MILLIGRAM(S): at 11:25

## 2018-11-02 RX ADMIN — Medication 2 MILLIGRAM(S): at 14:49

## 2018-11-02 RX ADMIN — Medication 1 PATCH: at 11:25

## 2018-11-02 RX ADMIN — Medication 100 MILLIGRAM(S): at 09:46

## 2018-11-02 NOTE — PROGRESS NOTE BEHAVIORAL HEALTH - NSBHFUPINTERVALHXFT_PSY_A_CORE
Generally intrusive with staff and med seeking.   MSE:  fair eye contact; appears disheveled.  clean.  spends time in the hallway.  alert, oriented, cognition intact; oriented ; no   AVH or s/h i/i/p; speech normal volume, spontaneous, clear; normal gait.  no evidence of tremor or rigidity.   medication focused and seeking;  somewhat  anxious; affect  thought congruent; tc/tp no peculiarities of language use; rather evasive and somewhat guarded; medication seeking; i&j: fair to poor.  accepts treatment; however not reflective on sxs or situation.    Tachycardia discussed with Dr. Castanon of medicine and Librium will be tapered very gradually with 75mg

## 2018-11-02 NOTE — PROGRESS NOTE BEHAVIORAL HEALTH - NSBHCHARTREVIEWVS_PSY_A_CORE FT
Vital Signs Last 24 Hrs  T(C): 36.7 (02 Nov 2018 05:56), Max: 37 (01 Nov 2018 16:12)  T(F): 98 (02 Nov 2018 05:56), Max: 98.6 (01 Nov 2018 16:12)  HR: 115 (02 Nov 2018 05:56) (89 - 134)  BP: 149/90 (02 Nov 2018 05:56) (129/80 - 157/100)  BP(mean): --  RR: 18 (02 Nov 2018 05:56) (18 - 18)  SpO2: 93% (02 Nov 2018 05:56) (93% - 93%)

## 2018-11-02 NOTE — PROGRESS NOTE BEHAVIORAL HEALTH - NSBHFUPINTERVALCCFT_PSY_A_CORE
"I can't wait until 1pm for my Librium"  ; agreeable about Zyprexa to writer .   No appetite or pain complaints.
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
## 2018-11-03 RX ORDER — OLANZAPINE 15 MG/1
10 TABLET, FILM COATED ORAL ONCE
Qty: 0 | Refills: 0 | Status: COMPLETED | OUTPATIENT
Start: 2018-11-03 | End: 2018-11-03

## 2018-11-03 RX ADMIN — Medication 1 PATCH: at 08:08

## 2018-11-03 RX ADMIN — Medication 400 MILLIGRAM(S): at 13:15

## 2018-11-03 RX ADMIN — Medication 1 TABLET(S): at 10:33

## 2018-11-03 RX ADMIN — Medication 0.1 MILLIGRAM(S): at 17:30

## 2018-11-03 RX ADMIN — Medication 400 MILLIGRAM(S): at 12:25

## 2018-11-03 RX ADMIN — Medication 1 PATCH: at 12:41

## 2018-11-03 RX ADMIN — OLANZAPINE 10 MILLIGRAM(S): 15 TABLET, FILM COATED ORAL at 21:10

## 2018-11-03 RX ADMIN — GABAPENTIN 300 MILLIGRAM(S): 400 CAPSULE ORAL at 21:10

## 2018-11-03 RX ADMIN — Medication 75 MILLIGRAM(S): at 21:10

## 2018-11-03 RX ADMIN — Medication 2 MILLIGRAM(S): at 21:12

## 2018-11-03 RX ADMIN — Medication 50 MILLIGRAM(S): at 01:30

## 2018-11-03 RX ADMIN — OLANZAPINE 10 MILLIGRAM(S): 15 TABLET, FILM COATED ORAL at 13:02

## 2018-11-03 RX ADMIN — Medication 400 MILLIGRAM(S): at 21:11

## 2018-11-03 RX ADMIN — GABAPENTIN 300 MILLIGRAM(S): 400 CAPSULE ORAL at 12:25

## 2018-11-03 RX ADMIN — Medication 50 MILLIGRAM(S): at 16:02

## 2018-11-03 RX ADMIN — Medication 2 MILLIGRAM(S): at 12:25

## 2018-11-03 RX ADMIN — Medication 75 MILLIGRAM(S): at 10:33

## 2018-11-03 RX ADMIN — Medication 0.1 MILLIGRAM(S): at 12:25

## 2018-11-03 RX ADMIN — Medication 1 MILLIGRAM(S): at 10:33

## 2018-11-03 RX ADMIN — Medication 50 MILLIGRAM(S): at 21:12

## 2018-11-03 RX ADMIN — Medication 50 MILLIGRAM(S): at 10:34

## 2018-11-03 RX ADMIN — Medication 1 PATCH: at 12:20

## 2018-11-03 RX ADMIN — Medication 1 PATCH: at 18:29

## 2018-11-03 RX ADMIN — GABAPENTIN 300 MILLIGRAM(S): 400 CAPSULE ORAL at 10:33

## 2018-11-03 RX ADMIN — Medication 100 MILLIGRAM(S): at 10:33

## 2018-11-04 RX ORDER — HYDROXYZINE HCL 10 MG
75 TABLET ORAL EVERY 6 HOURS
Qty: 0 | Refills: 0 | Status: DISCONTINUED | OUTPATIENT
Start: 2018-11-04 | End: 2018-11-08

## 2018-11-04 RX ORDER — TRAZODONE HCL 50 MG
50 TABLET ORAL AT BEDTIME
Qty: 0 | Refills: 0 | Status: DISCONTINUED | OUTPATIENT
Start: 2018-11-04 | End: 2018-11-08

## 2018-11-04 RX ORDER — GABAPENTIN 400 MG/1
800 CAPSULE ORAL THREE TIMES A DAY
Qty: 0 | Refills: 0 | Status: DISCONTINUED | OUTPATIENT
Start: 2018-11-04 | End: 2018-11-05

## 2018-11-04 RX ADMIN — Medication 1 PATCH: at 18:15

## 2018-11-04 RX ADMIN — Medication 1 PATCH: at 11:33

## 2018-11-04 RX ADMIN — GABAPENTIN 300 MILLIGRAM(S): 400 CAPSULE ORAL at 09:27

## 2018-11-04 RX ADMIN — Medication 50 MILLIGRAM(S): at 22:47

## 2018-11-04 RX ADMIN — Medication 75 MILLIGRAM(S): at 09:27

## 2018-11-04 RX ADMIN — Medication 2 MILLIGRAM(S): at 17:53

## 2018-11-04 RX ADMIN — Medication 2 MILLIGRAM(S): at 09:29

## 2018-11-04 RX ADMIN — Medication 0.1 MILLIGRAM(S): at 19:11

## 2018-11-04 RX ADMIN — Medication 75 MILLIGRAM(S): at 15:31

## 2018-11-04 RX ADMIN — Medication 400 MILLIGRAM(S): at 09:51

## 2018-11-04 RX ADMIN — Medication 2 MILLIGRAM(S): at 15:32

## 2018-11-04 RX ADMIN — GABAPENTIN 800 MILLIGRAM(S): 400 CAPSULE ORAL at 21:03

## 2018-11-04 RX ADMIN — GABAPENTIN 300 MILLIGRAM(S): 400 CAPSULE ORAL at 13:46

## 2018-11-04 RX ADMIN — Medication 400 MILLIGRAM(S): at 19:10

## 2018-11-04 RX ADMIN — Medication 2 MILLIGRAM(S): at 21:03

## 2018-11-04 RX ADMIN — Medication 0.1 MILLIGRAM(S): at 12:55

## 2018-11-04 RX ADMIN — Medication 1 MILLIGRAM(S): at 09:27

## 2018-11-04 RX ADMIN — Medication 100 MILLIGRAM(S): at 09:29

## 2018-11-04 RX ADMIN — Medication 400 MILLIGRAM(S): at 09:27

## 2018-11-04 RX ADMIN — Medication 75 MILLIGRAM(S): at 21:03

## 2018-11-04 RX ADMIN — Medication 400 MILLIGRAM(S): at 17:56

## 2018-11-04 RX ADMIN — Medication 2 MILLIGRAM(S): at 11:33

## 2018-11-04 RX ADMIN — Medication 1 TABLET(S): at 09:27

## 2018-11-04 RX ADMIN — OLANZAPINE 10 MILLIGRAM(S): 15 TABLET, FILM COATED ORAL at 21:03

## 2018-11-04 RX ADMIN — Medication 400 MILLIGRAM(S): at 00:40

## 2018-11-05 PROCEDURE — 99233 SBSQ HOSP IP/OBS HIGH 50: CPT | Mod: GC

## 2018-11-05 PROCEDURE — 90853 GROUP PSYCHOTHERAPY: CPT

## 2018-11-05 PROCEDURE — 99232 SBSQ HOSP IP/OBS MODERATE 35: CPT

## 2018-11-05 PROCEDURE — 93010 ELECTROCARDIOGRAM REPORT: CPT

## 2018-11-05 RX ORDER — GABAPENTIN 400 MG/1
600 CAPSULE ORAL THREE TIMES A DAY
Qty: 0 | Refills: 0 | Status: DISCONTINUED | OUTPATIENT
Start: 2018-11-05 | End: 2018-11-08

## 2018-11-05 RX ADMIN — Medication 0.1 MILLIGRAM(S): at 15:13

## 2018-11-05 RX ADMIN — GABAPENTIN 800 MILLIGRAM(S): 400 CAPSULE ORAL at 10:29

## 2018-11-05 RX ADMIN — GABAPENTIN 800 MILLIGRAM(S): 400 CAPSULE ORAL at 21:31

## 2018-11-05 RX ADMIN — Medication 1 PATCH: at 10:28

## 2018-11-05 RX ADMIN — OLANZAPINE 10 MILLIGRAM(S): 15 TABLET, FILM COATED ORAL at 21:31

## 2018-11-05 RX ADMIN — GABAPENTIN 800 MILLIGRAM(S): 400 CAPSULE ORAL at 15:13

## 2018-11-05 RX ADMIN — Medication 2 MILLIGRAM(S): at 15:13

## 2018-11-05 RX ADMIN — Medication 400 MILLIGRAM(S): at 12:31

## 2018-11-05 RX ADMIN — Medication 1 MILLIGRAM(S): at 10:30

## 2018-11-05 RX ADMIN — Medication 2 MILLIGRAM(S): at 12:31

## 2018-11-05 RX ADMIN — Medication 75 MILLIGRAM(S): at 10:29

## 2018-11-05 RX ADMIN — Medication 0.1 MILLIGRAM(S): at 19:00

## 2018-11-05 RX ADMIN — Medication 2 MILLIGRAM(S): at 17:33

## 2018-11-05 RX ADMIN — Medication 400 MILLIGRAM(S): at 21:31

## 2018-11-05 RX ADMIN — Medication 1 PATCH: at 10:33

## 2018-11-05 RX ADMIN — Medication 100 MILLIGRAM(S): at 10:29

## 2018-11-05 RX ADMIN — Medication 75 MILLIGRAM(S): at 10:26

## 2018-11-05 RX ADMIN — Medication 2 MILLIGRAM(S): at 10:33

## 2018-11-05 RX ADMIN — Medication 50 MILLIGRAM(S): at 21:31

## 2018-11-05 RX ADMIN — Medication 75 MILLIGRAM(S): at 17:33

## 2018-11-05 RX ADMIN — Medication 2 MILLIGRAM(S): at 21:32

## 2018-11-05 RX ADMIN — Medication 400 MILLIGRAM(S): at 22:21

## 2018-11-05 RX ADMIN — Medication 1 TABLET(S): at 10:29
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
## 2018-11-05 NOTE — PROGRESS NOTE BEHAVIORAL HEALTH - NSBHFUPINTERVALCCFT_PSY_A_CORE
Continues med focused; discussed tapering off of Librium and continuing on Zyprexa for mood;  vague about comittment to rehab.

## 2018-11-05 NOTE — PROGRESS NOTE BEHAVIORAL HEALTH - NSBHCHARTREVIEWVS_PSY_A_CORE FT
Vital Signs Last 24 Hrs  T(C): 36.4 (05 Nov 2018 06:00), Max: 36.9 (04 Nov 2018 16:32)  T(F): 97.5 (05 Nov 2018 06:00), Max: 98.4 (04 Nov 2018 16:32)  HR: 89 (05 Nov 2018 06:00) (89 - 137)  BP: 114/73 (05 Nov 2018 06:00) (114/73 - 153/98)  BP(mean): --  RR: 18 (05 Nov 2018 06:00) (18 - 18)  SpO2: --
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
## 2018-11-06 PROCEDURE — 99233 SBSQ HOSP IP/OBS HIGH 50: CPT | Mod: GC

## 2018-11-06 PROCEDURE — 99232 SBSQ HOSP IP/OBS MODERATE 35: CPT

## 2018-11-06 RX ADMIN — Medication 1 TABLET(S): at 09:53

## 2018-11-06 RX ADMIN — Medication 25 MILLIGRAM(S): at 21:16

## 2018-11-06 RX ADMIN — Medication 1 PATCH: at 11:57

## 2018-11-06 RX ADMIN — Medication 0.1 MILLIGRAM(S): at 12:44

## 2018-11-06 RX ADMIN — Medication 75 MILLIGRAM(S): at 04:04

## 2018-11-06 RX ADMIN — Medication 100 MILLIGRAM(S): at 09:53

## 2018-11-06 RX ADMIN — Medication 50 MILLIGRAM(S): at 07:51

## 2018-11-06 RX ADMIN — GABAPENTIN 600 MILLIGRAM(S): 400 CAPSULE ORAL at 12:44

## 2018-11-06 RX ADMIN — Medication 2 MILLIGRAM(S): at 17:42

## 2018-11-06 RX ADMIN — Medication 400 MILLIGRAM(S): at 11:59

## 2018-11-06 RX ADMIN — GABAPENTIN 600 MILLIGRAM(S): 400 CAPSULE ORAL at 21:16

## 2018-11-06 RX ADMIN — Medication 2 MILLIGRAM(S): at 15:37

## 2018-11-06 RX ADMIN — Medication 1 PATCH: at 09:57

## 2018-11-06 RX ADMIN — Medication 2 MILLIGRAM(S): at 09:53

## 2018-11-06 RX ADMIN — Medication 50 MILLIGRAM(S): at 21:16

## 2018-11-06 RX ADMIN — GABAPENTIN 600 MILLIGRAM(S): 400 CAPSULE ORAL at 09:53

## 2018-11-06 RX ADMIN — OLANZAPINE 10 MILLIGRAM(S): 15 TABLET, FILM COATED ORAL at 21:16

## 2018-11-06 RX ADMIN — Medication 2 MILLIGRAM(S): at 12:44

## 2018-11-06 RX ADMIN — Medication 1 MILLIGRAM(S): at 12:11

## 2018-11-06 RX ADMIN — Medication 400 MILLIGRAM(S): at 04:04

## 2018-11-06 RX ADMIN — Medication 75 MILLIGRAM(S): at 11:59

## 2018-11-06 RX ADMIN — Medication 400 MILLIGRAM(S): at 05:04

## 2018-11-06 RX ADMIN — Medication 400 MILLIGRAM(S): at 14:27

## 2018-11-06 NOTE — PROGRESS NOTE BEHAVIORAL HEALTH - NSBHCHARTREVIEWVS_PSY_A_CORE FT
Vital Signs Last 24 Hrs  T(C): 37.1 (06 Nov 2018 09:30), Max: 37.2 (05 Nov 2018 16:02)  T(F): 98.8 (06 Nov 2018 09:30), Max: 98.9 (05 Nov 2018 16:02)  HR: 120 (06 Nov 2018 09:30) (93 - 128)  BP: 127/87 (06 Nov 2018 09:30) (109/71 - 150/96)  BP(mean): --  RR: 18 (06 Nov 2018 09:30) (18 - 19)  SpO2: --

## 2018-11-06 NOTE — PROGRESS NOTE BEHAVIORAL HEALTH - NSBHFUPINTERVALHXFT_PSY_A_CORE
Now compliant with oral Abilify 25mg daily and Remeron 7.5mg at night.  In good control .    MSE: Mood improved;    No s/h i/i/p or avh; good    eye contact.  appears disheveled but clean ;  cognition is intact.  speech normal volume, spontaneous, clear.   demonstrates normal gait.   no tremor or rigidity; no longer appears blocked  or  intermally preoccupied; mood more cheeful ; affect  anxious not as labile and   not irritable;  ; affect  thought congruent; no strangeness of language use; rather evasive and somewhat guarded; i&j: fair to poor; accepts treatment; however minimally  reflective on sxs or situation.   Focused on leaving "Am I leaving before noon?" Now compliant with oral Abilify 25mg daily and Remeron 7.5mg at night.  In good control .    MSE: Mood improved;    No s/h i/i/p or avh; good    eye contact.  appears disheveled but clean ;  cognition is intact.  speech normal volume, spontaneous, clear.   demonstrates normal gait.   no tremor or rigidity; no longer appears blocked  or  internally preoccupied; mood more cheerful ; affect  anxious not as labile and   not irritable;  ; affect  thought congruent; no strangeness of language use; rather evasive and somewhat guarded; i&j: fair to poor; accepts treatment; however minimally  reflective on sxs or situation.   Focused on leaving "Am I leaving before noon?"
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
## 2018-11-07 LAB
ALBUMIN SERPL ELPH-MCNC: 3.9 G/DL — SIGNIFICANT CHANGE UP (ref 3.3–5)
ALP SERPL-CCNC: 44 U/L — SIGNIFICANT CHANGE UP (ref 40–120)
ALT FLD-CCNC: 50 U/L — HIGH (ref 10–45)
ANION GAP SERPL CALC-SCNC: 16 MMOL/L — SIGNIFICANT CHANGE UP (ref 5–17)
AST SERPL-CCNC: 20 U/L — SIGNIFICANT CHANGE UP (ref 10–40)
BILIRUB SERPL-MCNC: 0.2 MG/DL — SIGNIFICANT CHANGE UP (ref 0.2–1.2)
BUN SERPL-MCNC: 15 MG/DL — SIGNIFICANT CHANGE UP (ref 7–23)
CALCIUM SERPL-MCNC: 9 MG/DL — SIGNIFICANT CHANGE UP (ref 8.4–10.5)
CHLORIDE SERPL-SCNC: 100 MMOL/L — SIGNIFICANT CHANGE UP (ref 96–108)
CO2 SERPL-SCNC: 25 MMOL/L — SIGNIFICANT CHANGE UP (ref 22–31)
CREAT SERPL-MCNC: 0.77 MG/DL — SIGNIFICANT CHANGE UP (ref 0.5–1.3)
GLUCOSE SERPL-MCNC: 97 MG/DL — SIGNIFICANT CHANGE UP (ref 70–99)
HCT VFR BLD CALC: 40.6 % — SIGNIFICANT CHANGE UP (ref 39–50)
HGB BLD-MCNC: 13.6 G/DL — SIGNIFICANT CHANGE UP (ref 13–17)
MAGNESIUM SERPL-MCNC: 2.2 MG/DL — SIGNIFICANT CHANGE UP (ref 1.6–2.6)
MCHC RBC-ENTMCNC: 29.8 PG — SIGNIFICANT CHANGE UP (ref 27–34)
MCHC RBC-ENTMCNC: 33.5 G/DL — SIGNIFICANT CHANGE UP (ref 32–36)
MCV RBC AUTO: 89 FL — SIGNIFICANT CHANGE UP (ref 80–100)
PLATELET # BLD AUTO: 387 K/UL — SIGNIFICANT CHANGE UP (ref 150–400)
POTASSIUM SERPL-MCNC: 4.1 MMOL/L — SIGNIFICANT CHANGE UP (ref 3.5–5.3)
POTASSIUM SERPL-SCNC: 4.1 MMOL/L — SIGNIFICANT CHANGE UP (ref 3.5–5.3)
PROT SERPL-MCNC: 6.3 G/DL — SIGNIFICANT CHANGE UP (ref 6–8.3)
RBC # BLD: 4.56 M/UL — SIGNIFICANT CHANGE UP (ref 4.2–5.8)
RBC # FLD: 14.4 % — SIGNIFICANT CHANGE UP (ref 10.3–16.9)
SODIUM SERPL-SCNC: 141 MMOL/L — SIGNIFICANT CHANGE UP (ref 135–145)
TSH SERPL-MCNC: 0.55 UIU/ML — SIGNIFICANT CHANGE UP (ref 0.35–4.94)
WBC # BLD: 9.8 K/UL — SIGNIFICANT CHANGE UP (ref 3.8–10.5)
WBC # FLD AUTO: 9.8 K/UL — SIGNIFICANT CHANGE UP (ref 3.8–10.5)

## 2018-11-07 PROCEDURE — 99233 SBSQ HOSP IP/OBS HIGH 50: CPT | Mod: GC

## 2018-11-07 PROCEDURE — 99232 SBSQ HOSP IP/OBS MODERATE 35: CPT

## 2018-11-07 RX ADMIN — Medication 400 MILLIGRAM(S): at 10:42

## 2018-11-07 RX ADMIN — GABAPENTIN 600 MILLIGRAM(S): 400 CAPSULE ORAL at 21:43

## 2018-11-07 RX ADMIN — Medication 25 MILLIGRAM(S): at 06:59

## 2018-11-07 RX ADMIN — Medication 0.1 MILLIGRAM(S): at 13:19

## 2018-11-07 RX ADMIN — GABAPENTIN 600 MILLIGRAM(S): 400 CAPSULE ORAL at 13:19

## 2018-11-07 RX ADMIN — Medication 1 PATCH: at 09:27

## 2018-11-07 RX ADMIN — Medication 1 PATCH: at 11:38

## 2018-11-07 RX ADMIN — Medication 2 MILLIGRAM(S): at 13:19

## 2018-11-07 RX ADMIN — Medication 100 MILLIGRAM(S): at 09:20

## 2018-11-07 RX ADMIN — Medication 2 MILLIGRAM(S): at 16:20

## 2018-11-07 RX ADMIN — Medication 1 MILLIGRAM(S): at 09:20

## 2018-11-07 RX ADMIN — Medication 75 MILLIGRAM(S): at 09:20

## 2018-11-07 RX ADMIN — Medication 1 PATCH: at 11:40

## 2018-11-07 RX ADMIN — OLANZAPINE 10 MILLIGRAM(S): 15 TABLET, FILM COATED ORAL at 21:43

## 2018-11-07 RX ADMIN — Medication 400 MILLIGRAM(S): at 09:20

## 2018-11-07 RX ADMIN — Medication 25 MILLIGRAM(S): at 21:43

## 2018-11-07 RX ADMIN — Medication 1 TABLET(S): at 09:20

## 2018-11-07 RX ADMIN — Medication 2 MILLIGRAM(S): at 09:20

## 2018-11-07 RX ADMIN — GABAPENTIN 600 MILLIGRAM(S): 400 CAPSULE ORAL at 09:20

## 2018-11-07 NOTE — PROGRESS NOTE BEHAVIORAL HEALTH - NSBHCHARTREVIEWVS_PSY_A_CORE FT
Vital Signs Last 24 Hrs  T(C): 36.6 (07 Nov 2018 09:41), Max: 36.8 (06 Nov 2018 17:35)  T(F): 97.9 (07 Nov 2018 09:41), Max: 98.2 (06 Nov 2018 17:35)  HR: 116 (07 Nov 2018 09:41) (79 - 120)  BP: 160/96 (07 Nov 2018 09:41) (119/84 - 160/96)  BP(mean): --  RR: 18 (07 Nov 2018 06:00) (18 - 18)  SpO2: --

## 2018-11-07 NOTE — PROGRESS NOTE BEHAVIORAL HEALTH - NSBHFUPINTERVALHXFT_PSY_A_CORE
Patient somewhat oppositional on unit  speaking about obtaining drugs rather than avoiding them  when speaking to other patients.  Counselled by staff.  Med focused  and preoccupied but now focused on leaving.      MSE:  fair eye contact; appears disheveled.  clean.  spends time in the hallway.  alert, oriented, cognition intact; oriented ; no   AVH or s/h i/i/p; speech normal volume, spontaneous, clear; normal gait.  no evidence of tremor or rigidity.   medication focused and seeking;  somewhat  anxious; affect  thought congruent; tc/tp no peculiarities of language use; rather evasive and somewhat guarded; medication seeking; i&j: fair to poor.  accepts treatment; however not reflective on sxs or situation.
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
## 2018-11-07 NOTE — PROGRESS NOTE BEHAVIORAL HEALTH - NSBHFUPINTERVALCCFT_PSY_A_CORE
discussed at length medication management with plan for d/c in am.  Because bp continues somewhat elevated will continue Clonidine 0.1mg once a day;  patient will continue with Zyrexa 15mg total daily dose and Neurontin.  Sleep appetite ok. no pain issues. discussed at length medication management with plan for d/c in am.  Because bp continues somewhat elevated will continue Clonidine 0.1mg once a day;  patient will continue with Zyprexa 15mg total daily dose and Neurontin.  Sleep appetite ok. no pain issues.

## 2018-11-08 VITALS
RESPIRATION RATE: 18 BRPM | DIASTOLIC BLOOD PRESSURE: 90 MMHG | HEART RATE: 98 BPM | SYSTOLIC BLOOD PRESSURE: 141 MMHG | TEMPERATURE: 97 F

## 2018-11-08 PROCEDURE — 99233 SBSQ HOSP IP/OBS HIGH 50: CPT | Mod: GC

## 2018-11-08 PROCEDURE — 99238 HOSP IP/OBS DSCHRG MGMT 30/<: CPT

## 2018-11-08 RX ORDER — VENLAFAXINE HCL 75 MG
0 CAPSULE, EXT RELEASE 24 HR ORAL
Qty: 0 | Refills: 0 | COMMUNITY

## 2018-11-08 RX ORDER — GABAPENTIN 400 MG/1
1 CAPSULE ORAL
Qty: 45 | Refills: 0 | OUTPATIENT
Start: 2018-11-08 | End: 2018-11-22

## 2018-11-08 RX ORDER — OLANZAPINE 15 MG/1
1 TABLET, FILM COATED ORAL
Qty: 15 | Refills: 0 | OUTPATIENT
Start: 2018-11-08 | End: 2018-11-22

## 2018-11-08 RX ORDER — NICOTINE POLACRILEX 2 MG
2 GUM BUCCAL
Qty: 720 | Refills: 0 | OUTPATIENT
Start: 2018-11-08 | End: 2018-12-07

## 2018-11-08 RX ADMIN — Medication 1 PATCH: at 11:43

## 2018-11-08 RX ADMIN — Medication 100 MILLIGRAM(S): at 09:55

## 2018-11-08 RX ADMIN — GABAPENTIN 600 MILLIGRAM(S): 400 CAPSULE ORAL at 09:55

## 2018-11-08 RX ADMIN — Medication 1 TABLET(S): at 09:55

## 2018-11-08 RX ADMIN — Medication 1 PATCH: at 11:37

## 2018-11-08 RX ADMIN — Medication 25 MILLIGRAM(S): at 05:57

## 2018-11-08 RX ADMIN — Medication 2 MILLIGRAM(S): at 09:55

## 2018-11-08 RX ADMIN — Medication 1 MILLIGRAM(S): at 09:55

## 2018-11-08 RX ADMIN — Medication 75 MILLIGRAM(S): at 05:58

## 2018-11-08 NOTE — PROGRESS NOTE BEHAVIORAL HEALTH - THOUGHT CONTENT
Suicidality/Delusions
Delusions/Suicidality
Suicidality/Delusions
Suicidality/Delusions

## 2018-11-08 NOTE — PROGRESS NOTE BEHAVIORAL HEALTH - RISK ASSESSMENT
static: substance abuse; mood instability;    modifiable: address ETOH abuse/dependence; stabilize mood; counselling regarding coping with stressors; to be addressed by treatment plan  protective: positive life orientation; does not wish to die; can work with treaters. social skills  see interval and summary data for updates

## 2018-11-08 NOTE — PROGRESS NOTE BEHAVIORAL HEALTH - NSBHADMITIPOBSFT_PSY_A_CORE
makes needs known; visible .

## 2018-11-08 NOTE — PROGRESS NOTE BEHAVIORAL HEALTH - NSBHCHARTREVIEWVS_PSY_A_CORE FT
Vital Signs Last 24 Hrs  T(C): 36.2 (08 Nov 2018 09:00), Max: 36.9 (07 Nov 2018 17:00)  T(F): 97.2 (08 Nov 2018 09:00), Max: 98.4 (07 Nov 2018 17:00)  HR: 98 (08 Nov 2018 09:00) (79 - 103)  BP: 141/90 (08 Nov 2018 09:00) (136/80 - 145/90)  BP(mean): --  RR: 18 (08 Nov 2018 09:00) (18 - 20)  SpO2: --

## 2018-11-08 NOTE — PROGRESS NOTE BEHAVIORAL HEALTH - PROBLEM SELECTOR PROBLEM 1
Bipolar affective disorder, current episode mixed, current episode severity unspecified

## 2018-11-08 NOTE — PROGRESS NOTE BEHAVIORAL HEALTH - AXIS III
HTN, seizure hx

## 2018-11-08 NOTE — PROGRESS NOTE BEHAVIORAL HEALTH - NSBHADMITIPBHPROVFT_PSY_A_CORE
past 8 [facility] admission; reviewed with staff

## 2018-11-08 NOTE — PROGRESS NOTE BEHAVIORAL HEALTH - PROBLEM SELECTOR PLAN 3
detox; then counselling and possibly Naltrexone.  see summary and interval data for updates.

## 2018-11-08 NOTE — PROGRESS NOTE BEHAVIORAL HEALTH - PRIMARY DX
Psychosis, unspecified psychosis type

## 2018-11-08 NOTE — PROGRESS NOTE BEHAVIORAL HEALTH - SECONDARY DX3
Synthetic cannabinoid abuse

## 2018-11-08 NOTE — PROGRESS NOTE BEHAVIORAL HEALTH - NSBHPTASSESSDT_PSY_A_CORE
01-Nov-2018 08:44
05-Nov-2018 08:48
07-Nov-2018 08:59
08-Nov-2018 08:41
06-Nov-2018 08:50
02-Nov-2018 08:24
31-Oct-2018 08:29

## 2018-11-08 NOTE — PROGRESS NOTE BEHAVIORAL HEALTH - PROBLEM SELECTOR PROBLEM 2
Polysubstance abuse

## 2018-11-08 NOTE — PROGRESS NOTE BEHAVIORAL HEALTH - SECONDARY DX1
Caffeine intoxication with complication
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
## 2018-11-08 NOTE — PROGRESS NOTE BEHAVIORAL HEALTH - AFFECT QUALITY
Elevated/Anxious
Anxious/Elevated
Elevated/Anxious

## 2018-11-08 NOTE — PROGRESS NOTE BEHAVIORAL HEALTH - NSBHFUPINTERVALHXFT_PSY_A_CORE
Very interactive with other patients wandering the hallway;  cheerful but also anxious.  .   MSE:  fair eye contact; appears disheveled.  clean.  spends time in the hallway.  alert, oriented, cognition intact; oriented ; no   AVH or s/h i/i/p; speech normal volume, spontaneous, clear; normal gait.  no evidence of tremor or rigidity.   medication focused and seeking;  somewhat  anxious; affect  thought congruent; tc/tp no peculiarities of language use; rather evasive and somewhat guarded; medication seeking; i&j: fair to poor.  accepts treatment; however not reflective on sxs or situation.

## 2018-11-08 NOTE — PROGRESS NOTE BEHAVIORAL HEALTH - NSBHFUPINTERVALCCFT_PSY_A_CORE
sleep appettie ok.  says he only needs Neurontin.  However reminded of need for Zyprexa for mood and Clonidine for bp.  no pain issues anticipating leaving today.

## 2018-11-08 NOTE — PROGRESS NOTE BEHAVIORAL HEALTH - PERCEPTIONS
Auditory hallucinations

## 2018-11-08 NOTE — PROGRESS NOTE BEHAVIORAL HEALTH - PROBLEM SELECTOR PLAN 2
counselling; rehab; Started in NRT gum 2mg q 2h prn as requested by patient
counselling; rehab
counselling; rehab; Started in NRT gum 2mg q 2h prn as requested by patient see summary and interval data for updates.
counselling; rehab; Started in NRT gum 2mg q 2h prn as requested by patient
counselling; rehab; Started in NRT gum 2mg q 2h prn as requested by patient
counselling; rehab; Started in NRT gum 2mg q 2h prn as requested by patient see summary and interval data for updates.
counselling; rehab; Started in NRT gum 2mg q 2h prn as requested by patient

## 2018-11-14 DIAGNOSIS — F31.61 BIPOLAR DISORDER, CURRENT EPISODE MIXED, MILD: ICD-10-CM

## 2018-11-14 DIAGNOSIS — R56.9 UNSPECIFIED CONVULSIONS: ICD-10-CM

## 2018-11-14 DIAGNOSIS — F11.90 OPIOID USE, UNSPECIFIED, UNCOMPLICATED: ICD-10-CM

## 2018-11-14 DIAGNOSIS — F19.10 OTHER PSYCHOACTIVE SUBSTANCE ABUSE, UNCOMPLICATED: ICD-10-CM
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
## 2018-11-14 DIAGNOSIS — Z91.14 PATIENT'S OTHER NONCOMPLIANCE WITH MEDICATION REGIMEN: ICD-10-CM

## 2018-11-14 DIAGNOSIS — I10 ESSENTIAL (PRIMARY) HYPERTENSION: ICD-10-CM

## 2018-11-14 DIAGNOSIS — F98.8 OTHER SPECIFIED BEHAVIORAL AND EMOTIONAL DISORDERS WITH ONSET USUALLY OCCURRING IN CHILDHOOD AND ADOLESCENCE: ICD-10-CM

## 2018-11-14 DIAGNOSIS — F17.210 NICOTINE DEPENDENCE, CIGARETTES, UNCOMPLICATED: ICD-10-CM

## 2018-11-14 DIAGNOSIS — F15.929 OTHER STIMULANT USE, UNSPECIFIED WITH INTOXICATION, UNSPECIFIED: ICD-10-CM

## 2018-11-14 DIAGNOSIS — F10.20 ALCOHOL DEPENDENCE, UNCOMPLICATED: ICD-10-CM

## 2018-12-26 ENCOUNTER — INPATIENT (INPATIENT)
Facility: HOSPITAL | Age: 33
LOS: 0 days | Discharge: HOME | DRG: 897 | End: 2018-12-27
Attending: STUDENT IN AN ORGANIZED HEALTH CARE EDUCATION/TRAINING PROGRAM | Admitting: STUDENT IN AN ORGANIZED HEALTH CARE EDUCATION/TRAINING PROGRAM
Payer: COMMERCIAL

## 2018-12-26 VITALS
SYSTOLIC BLOOD PRESSURE: 127 MMHG | TEMPERATURE: 98 F | OXYGEN SATURATION: 97 % | HEART RATE: 130 BPM | DIASTOLIC BLOOD PRESSURE: 73 MMHG | RESPIRATION RATE: 18 BRPM

## 2018-12-26 LAB
ALBUMIN SERPL ELPH-MCNC: 4.5 G/DL — SIGNIFICANT CHANGE UP (ref 3.3–5)
ALP SERPL-CCNC: 54 U/L — SIGNIFICANT CHANGE UP (ref 40–120)
ALT FLD-CCNC: 37 U/L — SIGNIFICANT CHANGE UP (ref 10–45)
ANION GAP SERPL CALC-SCNC: 17 MMOL/L — SIGNIFICANT CHANGE UP (ref 5–17)
APAP SERPL-MCNC: <5 UG/ML — LOW (ref 10–30)
APPEARANCE UR: CLEAR — SIGNIFICANT CHANGE UP
AST SERPL-CCNC: 22 U/L — SIGNIFICANT CHANGE UP (ref 10–40)
BASOPHILS NFR BLD AUTO: 0.4 % — SIGNIFICANT CHANGE UP (ref 0–2)
BILIRUB SERPL-MCNC: 0.2 MG/DL — SIGNIFICANT CHANGE UP (ref 0.2–1.2)
BILIRUB UR-MCNC: NEGATIVE — SIGNIFICANT CHANGE UP
BUN SERPL-MCNC: 11 MG/DL — SIGNIFICANT CHANGE UP (ref 7–23)
CALCIUM SERPL-MCNC: 9.2 MG/DL — SIGNIFICANT CHANGE UP (ref 8.4–10.5)
CHLORIDE SERPL-SCNC: 97 MMOL/L — SIGNIFICANT CHANGE UP (ref 96–108)
CO2 SERPL-SCNC: 24 MMOL/L — SIGNIFICANT CHANGE UP (ref 22–31)
COLOR SPEC: YELLOW — SIGNIFICANT CHANGE UP
CREAT SERPL-MCNC: 0.68 MG/DL — SIGNIFICANT CHANGE UP (ref 0.5–1.3)
DIFF PNL FLD: NEGATIVE — SIGNIFICANT CHANGE UP
EOSINOPHIL NFR BLD AUTO: 1.8 % — SIGNIFICANT CHANGE UP (ref 0–6)
ETHANOL SERPL-MCNC: 68 MG/DL — HIGH (ref 0–10)
GLUCOSE SERPL-MCNC: 94 MG/DL — SIGNIFICANT CHANGE UP (ref 70–99)
GLUCOSE UR QL: NEGATIVE — SIGNIFICANT CHANGE UP
HCT VFR BLD CALC: 42.6 % — SIGNIFICANT CHANGE UP (ref 39–50)
HGB BLD-MCNC: 14.4 G/DL — SIGNIFICANT CHANGE UP (ref 13–17)
KETONES UR-MCNC: NEGATIVE — SIGNIFICANT CHANGE UP
LEUKOCYTE ESTERASE UR-ACNC: NEGATIVE — SIGNIFICANT CHANGE UP
LYMPHOCYTES # BLD AUTO: 23.7 % — SIGNIFICANT CHANGE UP (ref 13–44)
MCHC RBC-ENTMCNC: 28.9 PG — SIGNIFICANT CHANGE UP (ref 27–34)
MCHC RBC-ENTMCNC: 33.8 G/DL — SIGNIFICANT CHANGE UP (ref 32–36)
MCV RBC AUTO: 85.5 FL — SIGNIFICANT CHANGE UP (ref 80–100)
MONOCYTES NFR BLD AUTO: 6.9 % — SIGNIFICANT CHANGE UP (ref 2–14)
NEUTROPHILS NFR BLD AUTO: 67.2 % — SIGNIFICANT CHANGE UP (ref 43–77)
NITRITE UR-MCNC: NEGATIVE — SIGNIFICANT CHANGE UP
PCP SPEC-MCNC: SIGNIFICANT CHANGE UP
PH UR: 6 — SIGNIFICANT CHANGE UP (ref 5–8)
PLATELET # BLD AUTO: 333 K/UL — SIGNIFICANT CHANGE UP (ref 150–400)
POTASSIUM SERPL-MCNC: 4.2 MMOL/L — SIGNIFICANT CHANGE UP (ref 3.5–5.3)
POTASSIUM SERPL-SCNC: 4.2 MMOL/L — SIGNIFICANT CHANGE UP (ref 3.5–5.3)
PROT SERPL-MCNC: 7.4 G/DL — SIGNIFICANT CHANGE UP (ref 6–8.3)
PROT UR-MCNC: NEGATIVE MG/DL — SIGNIFICANT CHANGE UP
RBC # BLD: 4.98 M/UL — SIGNIFICANT CHANGE UP (ref 4.2–5.8)
RBC # FLD: 13.4 % — SIGNIFICANT CHANGE UP (ref 10.3–16.9)
SALICYLATES SERPL-MCNC: <0.3 MG/DL — LOW (ref 2.8–20)
SODIUM SERPL-SCNC: 138 MMOL/L — SIGNIFICANT CHANGE UP (ref 135–145)
SP GR SPEC: 1.01 — SIGNIFICANT CHANGE UP (ref 1–1.03)
TSH SERPL-MCNC: 0.64 UIU/ML — SIGNIFICANT CHANGE UP (ref 0.35–4.94)
UROBILINOGEN FLD QL: 0.2 E.U./DL — SIGNIFICANT CHANGE UP
WBC # BLD: 9.9 K/UL — SIGNIFICANT CHANGE UP (ref 3.8–10.5)
WBC # FLD AUTO: 9.9 K/UL — SIGNIFICANT CHANGE UP (ref 3.8–10.5)

## 2018-12-26 PROCEDURE — 85025 COMPLETE CBC W/AUTO DIFF WBC: CPT

## 2018-12-26 PROCEDURE — 84484 ASSAY OF TROPONIN QUANT: CPT

## 2018-12-26 PROCEDURE — 83036 HEMOGLOBIN GLYCOSYLATED A1C: CPT

## 2018-12-26 PROCEDURE — 93010 ELECTROCARDIOGRAM REPORT: CPT

## 2018-12-26 PROCEDURE — 71046 X-RAY EXAM CHEST 2 VIEWS: CPT | Mod: 26

## 2018-12-26 PROCEDURE — 84443 ASSAY THYROID STIM HORMONE: CPT

## 2018-12-26 PROCEDURE — 80307 DRUG TEST PRSMV CHEM ANLYZR: CPT

## 2018-12-26 PROCEDURE — 70450 CT HEAD/BRAIN W/O DYE: CPT

## 2018-12-26 PROCEDURE — 81003 URINALYSIS AUTO W/O SCOPE: CPT

## 2018-12-26 PROCEDURE — 99285 EMERGENCY DEPT VISIT HI MDM: CPT | Mod: 25

## 2018-12-26 PROCEDURE — 36415 COLL VENOUS BLD VENIPUNCTURE: CPT

## 2018-12-26 PROCEDURE — 83735 ASSAY OF MAGNESIUM: CPT

## 2018-12-26 PROCEDURE — 90792 PSYCH DIAG EVAL W/MED SRVCS: CPT | Mod: GT

## 2018-12-26 PROCEDURE — 85027 COMPLETE CBC AUTOMATED: CPT

## 2018-12-26 PROCEDURE — 99223 1ST HOSP IP/OBS HIGH 75: CPT | Mod: GC

## 2018-12-26 PROCEDURE — 80061 LIPID PANEL: CPT

## 2018-12-26 PROCEDURE — 99231 SBSQ HOSP IP/OBS SF/LOW 25: CPT

## 2018-12-26 PROCEDURE — 80053 COMPREHEN METABOLIC PANEL: CPT

## 2018-12-26 PROCEDURE — 93005 ELECTROCARDIOGRAM TRACING: CPT

## 2018-12-26 RX ORDER — SODIUM CHLORIDE 9 MG/ML
2000 INJECTION INTRAMUSCULAR; INTRAVENOUS; SUBCUTANEOUS ONCE
Qty: 0 | Refills: 0 | Status: COMPLETED | OUTPATIENT
Start: 2018-12-26 | End: 2018-12-26

## 2018-12-26 RX ORDER — GABAPENTIN 400 MG/1
600 CAPSULE ORAL ONCE
Qty: 0 | Refills: 0 | Status: COMPLETED | OUTPATIENT
Start: 2018-12-26 | End: 2018-12-26

## 2018-12-26 RX ADMIN — Medication 50 MILLIGRAM(S): at 23:00

## 2018-12-26 RX ADMIN — GABAPENTIN 600 MILLIGRAM(S): 400 CAPSULE ORAL at 22:46

## 2018-12-26 RX ADMIN — Medication 4 MILLIGRAM(S): at 23:29

## 2018-12-26 RX ADMIN — Medication 2 MILLIGRAM(S): at 22:42

## 2018-12-26 RX ADMIN — Medication 4 MILLIGRAM(S): at 23:45

## 2018-12-26 RX ADMIN — SODIUM CHLORIDE 2000 MILLILITER(S): 9 INJECTION INTRAMUSCULAR; INTRAVENOUS; SUBCUTANEOUS at 21:54

## 2018-12-26 RX ADMIN — Medication 2 MILLIGRAM(S): at 21:53

## 2018-12-26 NOTE — CONSULT NOTE ADULT - ASSESSMENT
32 y/o M with PMHx of polysubstance abuse (heroin, methadone, K2, cocaine), bipolar disorder type 1, ADHD, seizures, presents to the ED for panic attacks, auditory/visual hallucinations, and suicidal ideation. Mr. Wayne is an inconsistent historian, changing his story several times during this interview. He states that for the last three weeks, he has been having worsening panic attacks and hallucinations of both the auditory and visual variety. MICU consulted for     1. Suicidal ideation. Patient endorsing anxiety and suicidal ideation for the last three weeks.  - Recommend psychiatry consult. Patient is requesting inpatient psychiatric admission and to restart his medications.  - Continue 1:1 for suicidal ideation.    2. Monitoring for EtOH withdrawal. EtOH level positive on admission, last drink on 12/25. CIWA 15, but some points may be attributed to psychiatric illness.  - Continue to monitor for signs of EtOH withdrawal with serial CIWA scores.  - Consider starting on librium and ativan 2mg q4h prn for CIWA scores >8.  - Start on thiamine, folate, and multivitamin.    Case discussed with Dr. Morel (CCM attending) and ED team.  Dispo: does not need telemetry monitoring or ICU at this time.    Thank you for consulting [facility].     Henry Chiang, PGY-3

## 2018-12-26 NOTE — CONSULT NOTE ADULT - SUBJECTIVE AND OBJECTIVE BOX
**************************  CRITICAL CARE CONSULT NOTE    32 y/o M with PMHx of polysubstance abuse (heroin, methadone, K2, cocaine), bipolar disorder type 1, ADHD, seizures, presents to the ED for.     Of note, he has had psychiatric admissions in the past at [facility] for suicidal ideation with command hallucinations to kill himself. He had not used his psych meds (zyprexa, lamictal, atarax, and strattera) and felt worse as he used more substances.     In the ED, VS T 98, , bp 127/73, RR 18, O2 97 on RA. CBC and CMP unremarkable. BAL 68 and UTox positive for THC. Was given ativan 2mg IVP x 2, gabapentin 600mg x 1, and 1L NS bolus x 2.    PMHx:  Surg hx:  Meds:   All:  Soc hx:  Fhx:    VITAL SIGNS:  ICU Vital Signs Last 24 Hrs  T(C): 36.7 (26 Dec 2018 20:48), Max: 36.7 (26 Dec 2018 20:48)  T(F): 98 (26 Dec 2018 20:48), Max: 98 (26 Dec 2018 20:48)  HR: 119 (26 Dec 2018 21:21) (119 - 130)  BP: 150/85 (26 Dec 2018 21:21) (127/73 - 150/85)  RR: 18 (26 Dec 2018 20:48) (18 - 18)  SpO2: 97% (26 Dec 2018 20:48) (97% - 97%)    POCT Blood Glucose.: 102 mg/dL (26 Dec 2018 21:01)    PHYSICAL EXAM   General: NAD  HEENT: NCAT, PERRL, clear conjunctiva, no scleral icterus  Neck: supple, no JVD  Respiratory: CTA b/l, no wheezing, rhonchi, rales  Cardiovascular: RRR, normal S1S2, no M/R/G  Abdomen: soft, NT/ND, bowel sounds normoactive throughout, no palpable masses  Extremities: WWP, no clubbing, cyanosis, or edema  Neuro: AOx3    LABS                        14.4   9.9   )-----------( 333      ( 26 Dec 2018 21:53 )             42.6     12-26    138  |  97  |  11  ----------------------------<  94  4.2   |  24  |  0.68    Ca    9.2      26 Dec 2018 21:53    TPro  7.4  /  Alb  4.5  /  TBili  0.2  /  DBili  x   /  AST  22  /  ALT  37  /  AlkPhos  54  12-26    Urinalysis Basic - ( 26 Dec 2018 21:53 )    Color: Yellow / Appearance: Clear / S.015 / pH: x  Gluc: x / Ketone: NEGATIVE  / Bili: Negative / Urobili: 0.2 E.U./dL   Blood: x / Protein: NEGATIVE mg/dL / Nitrite: NEGATIVE   Leuk Esterase: NEGATIVE / RBC: x / WBC x   Sq Epi: x / Non Sq Epi: x / Bacteria: x    IMAGING  CXR -   EKG - **************************  CRITICAL CARE CONSULT NOTE    32 y/o M with PMHx of polysubstance abuse (heroin, methadone, K2, cocaine), bipolar disorder type 1, ADHD, seizures, presents to the ED for panic attacks, auditory/visual hallucinations, and suicidal ideation. Mr. Wayne is an inconsistent historian, changing his story several times during this interview. He states that for the last three weeks, he has been having worsening panic attacks and hallucinations of both the auditory and visual variety. Today he saw a unicorn on the sidewalk, but when he went up to pet it, it wasn't really there. He also heard a voice today, telling him to jump in front of a car. That is when he came into the ER. He otherwise denies fever, chills, lightheadedness, CP, palpitations, SOB, cough, URI symptoms, N/V/D/C, abdominal pain, dysuria, hematuria, changes in bowel habits, LE edema. He says he ran out of his medications recently. He takes zyprexa 5 in the morning, 10 at night, wellbutrin, ativan, and klonopin. He drinks one beer every several days. Last drink was yesterday ().     Of note, he has had psychiatric admissions in the past at [facility] for suicidal ideation with command hallucinations to kill himself. He had not used his psych meds (zyprexa, lamictal, atarax, and strattera) and felt worse as he used more substances. Was also recently at a long term rehab facility [facility].    In the ED, VS T 98, , bp 127/73, RR 18, O2 97 on RA. CBC and CMP unremarkable. BAL 68 and UTox positive for THC. Was given ativan 2mg IVP x 2, gabapentin 600mg x 1, and 1L NS bolus x 2. No CIWA was done initially.    PMHx: as above.  Surg hx: as above.  Meds: zyprexa, lamictal, atarax, strattera  All: NKA  Soc hx: denies tobacco, EtOH 1 beer every 2-3 days, denies drug use  Fhx: non-contributory    VITAL SIGNS:  ICU Vital Signs Last 24 Hrs  T(C): 36.7 (26 Dec 2018 20:48), Max: 36.7 (26 Dec 2018 20:48)  T(F): 98 (26 Dec 2018 20:48), Max: 98 (26 Dec 2018 20:48)  HR: 119 (26 Dec 2018 21:21) (119 - 130)  BP: 150/85 (26 Dec 2018 21:21) (127/73 - 150/85)  RR: 18 (26 Dec 2018 20:48) (18 - 18)  SpO2: 97% (26 Dec 2018 20:48) (97% - 97%)    POCT Blood Glucose.: 102 mg/dL (26 Dec 2018 21:01)    PHYSICAL EXAM   General: NAD, eating a sandwich, speaking in full sentences, no diaphoresis  HEENT: NCAT, PERRL, clear conjunctiva, no scleral icterus, no tongue fasciculations  Neck: supple, no JVD  Respiratory: CTA b/l, no wheezing, rhonchi, rales  Cardiovascular: tachycardic, regular rhythm, normal S1S2, no M/R/G  Abdomen: soft, NT/ND, bowel sounds normoactive throughout, no palpable masses  Extremities: WWP, no clubbing, cyanosis, or edema; b/l UE tremors that stop intermittently  Neuro: AOx3, no motor or sensory deficits    LABS                        14.4   9.9   )-----------( 333      ( 26 Dec 2018 21:53 )             42.6         138  |  97  |  11  ----------------------------<  94  4.2   |  24  |  0.68    Ca    9.2      26 Dec 2018 21:53    TPro  7.4  /  Alb  4.5  /  TBili  0.2  /  DBili  x   /  AST  22  /  ALT  37  /  AlkPhos  54      Urinalysis Basic - ( 26 Dec 2018 21:53 )    Color: Yellow / Appearance: Clear / S.015 / pH: x  Gluc: x / Ketone: NEGATIVE  / Bili: Negative / Urobili: 0.2 E.U./dL   Blood: x / Protein: NEGATIVE mg/dL / Nitrite: NEGATIVE   Leuk Esterase: NEGATIVE / RBC: x / WBC x   Sq Epi: x / Non Sq Epi: x / Bacteria: x

## 2018-12-26 NOTE — ED PROVIDER NOTE - MEDICAL DECISION MAKING DETAILS
33M w/ endorsed hx of bipolar, polysubstance abuse, off meds for unclear duration 4d to 4wks, drinking daily for past week of at least 2x24oz beer, stopped today. Now w/ worsening SI w/ plan, as well as AVH. no hi. Tachycardic, tremulous, anxious on arrival. Improved after multiple doses ativan & IVF. EKG not ischemic. DIscussed w/ ICU felt stable for floor + psych consult. Admitted medicine for etoh withdrawal as well as SI+plan, AVH. No fever or chills or neck pain/photophobia to suggest meningitis.

## 2018-12-26 NOTE — ED ADULT TRIAGE NOTE - ARRIVAL INFO ADDITIONAL COMMENTS
pt states he was incarcerated for 2 weeks and now has visual hallucinations, feels suicidal.  calm and cooperative in triage.  states his plan is to jump into traffic.  states he has not taken his bipolar meds in 4 days.

## 2018-12-26 NOTE — ED PROVIDER NOTE - DIAGNOSTIC INTERPRETATION
Chest x-ray interpreted by ER Physician Dr. Golden  Findings: heart size normal, no infiltrates, lungs fully expanded, soft tissues appear normal. no sig change compared w/ prior

## 2018-12-26 NOTE — ED PROVIDER NOTE - NS ED MD TWO NIGHTS YN
Blood pressures are high.  Will increase lisinopril-HCTZ to 20-12 0.5 mg twice a day.  Continue to monitor blood pressure correctly at home.  We will start another blood pressure medicine in addition to lisinopril-HCTZ if blood pressures remain elevated.  Fasting blood sugar is mildly elevated hemoglobin A1c is stable at 6.0.  Serum creatinine is unchanged.  Urine MA is normal.  Thyroid still running hormone level and prostate-specific antigen are normal.  Continue diet and exercise.  
Yes

## 2018-12-26 NOTE — ED PROVIDER NOTE - CARE PLAN
Principal Discharge DX:	Suicidal ideation Principal Discharge DX:	Alcohol withdrawal syndrome with perceptual disturbance

## 2018-12-26 NOTE — ED PROVIDER NOTE - PHYSICAL EXAMINATION
VITAL SIGNS: I have reviewed nursing notes and confirm.  CONSTITUTIONAL: Well-developed; well-nourished; in moderate distress.  SKIN: Skin is warm, no acute rash, minimal sweat  HEAD: Normocephalic; atraumatic.  EYES:  EOM intact; conjunctiva and sclera clear.  ENT: No nasal discharge; airway clear.  NECK: Supple; Voluntary FROM  CARD: No rubs appreciated, tachycardic rate and rhythm.  RESP: No wheezes, no rales. No respiratory distress  ABD: Soft; non-distended; non-tender; no rebound or guarding  EXT: Normal ROM. No cyanosis or edema.  NEURO: Alert, oriented. Grossly unremarkable except fro mild tremulousness.   PSYCH: Cooperative, +SI, +AVH. Moderately anxious but otherwise appropriate

## 2018-12-26 NOTE — ED PROVIDER NOTE - OBJECTIVE STATEMENT
33M w/ endorsed hx of bipolar, per chart: reported PPhx of bipolar disorder, ADHD, ETOH abuse (Hx w/d symptoms including seizures), cocaine abuse, opiate abuse, K2 abuse.    Today:  off meds for 4d to 4wks (pt story changing) p/w worsening SI for past few days, as well as visual and auditory hallucinations, palpitations and cp. Unclear why stopped taking meds. Endorses concern not taking them makes his symptoms worse.   Normally drinks daily, last etoh yesterday 2 x 24oz beers. Today no etoh, and states has had progressively worsening palpitations, anxiety, generalized chest discomfort, and visual hallucinations of a unicorn in front of him that he cannot touch/reach, as well as nonspec auditory hallucinations.   Endorses SI w/ plan to jump in traffic. Denies HI.    No n/v/diarrhea, no black or bloody stools, no abd pain, no cough, no runny nose.

## 2018-12-26 NOTE — ED PROVIDER NOTE - PROGRESS NOTE DETAILS
Discussed w/ ICU, agreed tele not required. agreed medicine w/ psych consult. pt feeling mild improvement w/ 2 ativan 2L IVF, still tachycardic, add'l ativan given pt now received 8 ativan, improved overall but still tachycardic and mild anxiety. Discussed w/ ICU, agreed tele not required, recs medicine floor/regional w/ psych consult. 12 total ativan IV, 50 librium po. CIWA improved from ~21 at arrival to 13 now. Discussed w/ psych, agreed to ED hanna to assist in inpt mgmt.   Pt coated himself in moisturizer. Pt somnolent but arousable w/ stimuli. Psych attempted, but was unable to complete assessment. recs rpt eval in am, no restarting of antipsychotics until eval completed. Recs treating recurrence of agitation as etoh withdrawal Discussed w/ ICU, agreed tele not required, recs medicine floor/regional w/ psych consult.  Pt still ambulating through dept, pleasant and conversant. Discussed w/ ICU Dr. Morel, agreed tele not required, recs medicine floor/regional w/ psych consult.  Pt still ambulating through dept, pleasant and conversant. Pt somnolent but arousable w/ stimuli. Psych Dr. Ventura attempted eval, but was unable to complete assessment. recs rpt eval in am, no restarting of antipsychotics until eval completed. Recs treating recurrence of agitation as etoh withdrawal

## 2018-12-26 NOTE — ED ADULT NURSE REASSESSMENT NOTE - NS ED NURSE REASSESS COMMENT FT1
pt continues to complain of anxiety despite Ativan. MD Breed aware awaiting orders and ICU eval will monitor and reassess. pt remains on 1:1 CO for safety NS bolus #2 infusing

## 2018-12-26 NOTE — CONSULT NOTE ADULT - ATTENDING COMMENTS
polysubstance abuse and alcohol withdrawal in context of chronic psychiatric illness. he is cooperative and neurologically intact (just returned from walking across ED to bathroom). agree with po and iv benzo for alcohol withdrawal,.IVf hydration. would consult psychiatry for management of his psychiatric meds. cont 1: 1 escort. no indication for tele/icu at this time.

## 2018-12-26 NOTE — ED ADULT NURSE NOTE - NSIMPLEMENTINTERV_GEN_ALL_ED
Implemented All Universal Safety Interventions:  [place] to call system. Call bell, personal items and telephone within reach. Instruct patient to call for assistance. Room bathroom lighting operational. Non-slip footwear when patient is off stretcher. Physically safe environment: no spills, clutter or unnecessary equipment. Stretcher in lowest position, wheels locked, appropriate side rails in place.

## 2018-12-27 ENCOUNTER — TRANSCRIPTION ENCOUNTER (OUTPATIENT)
Age: 33
End: 2018-12-27

## 2018-12-27 VITALS — WEIGHT: 169.98 LBS

## 2018-12-27 DIAGNOSIS — F12.10 CANNABIS ABUSE, UNCOMPLICATED: ICD-10-CM

## 2018-12-27 DIAGNOSIS — F10.231 ALCOHOL DEPENDENCE WITH WITHDRAWAL DELIRIUM: ICD-10-CM

## 2018-12-27 DIAGNOSIS — Z91.89 OTHER SPECIFIED PERSONAL RISK FACTORS, NOT ELSEWHERE CLASSIFIED: ICD-10-CM

## 2018-12-27 DIAGNOSIS — Z29.9 ENCOUNTER FOR PROPHYLACTIC MEASURES, UNSPECIFIED: ICD-10-CM

## 2018-12-27 DIAGNOSIS — F31.9 BIPOLAR DISORDER, UNSPECIFIED: ICD-10-CM

## 2018-12-27 DIAGNOSIS — F19.94 OTHER PSYCHOACTIVE SUBSTANCE USE, UNSPECIFIED WITH PSYCHOACTIVE SUBSTANCE-INDUCED MOOD DISORDER: ICD-10-CM

## 2018-12-27 DIAGNOSIS — R09.89 OTHER SPECIFIED SYMPTOMS AND SIGNS INVOLVING THE CIRCULATORY AND RESPIRATORY SYSTEMS: ICD-10-CM

## 2018-12-27 DIAGNOSIS — F14.20 COCAINE DEPENDENCE, UNCOMPLICATED: ICD-10-CM

## 2018-12-27 DIAGNOSIS — F10.239 ALCOHOL DEPENDENCE WITH WITHDRAWAL, UNSPECIFIED: ICD-10-CM

## 2018-12-27 DIAGNOSIS — F19.10 OTHER PSYCHOACTIVE SUBSTANCE ABUSE, UNCOMPLICATED: ICD-10-CM

## 2018-12-27 DIAGNOSIS — R63.8 OTHER SYMPTOMS AND SIGNS CONCERNING FOOD AND FLUID INTAKE: ICD-10-CM

## 2018-12-27 DIAGNOSIS — R45.851 SUICIDAL IDEATIONS: ICD-10-CM

## 2018-12-27 DIAGNOSIS — R00.0 TACHYCARDIA, UNSPECIFIED: ICD-10-CM

## 2018-12-27 PROCEDURE — 99233 SBSQ HOSP IP/OBS HIGH 50: CPT

## 2018-12-27 RX ORDER — OLANZAPINE 15 MG/1
0 TABLET, FILM COATED ORAL
Qty: 0 | Refills: 0 | COMMUNITY

## 2018-12-27 RX ORDER — SODIUM CHLORIDE 9 MG/ML
1000 INJECTION, SOLUTION INTRAVENOUS
Qty: 0 | Refills: 0 | Status: DISCONTINUED | OUTPATIENT
Start: 2018-12-27 | End: 2018-12-27

## 2018-12-27 RX ADMIN — Medication 2 MILLIGRAM(S): at 05:13

## 2018-12-27 RX ADMIN — SODIUM CHLORIDE 125 MILLILITER(S): 9 INJECTION, SOLUTION INTRAVENOUS at 03:20

## 2018-12-27 NOTE — PROGRESS NOTE BEHAVIORAL HEALTH - SUMMARY
34 YO M c malingering, purported bipolar disorder and polysubstance (etoh/cocaine/opioid/cannabis/caffeine) use d/o, and HTN came to ED c/o SI & AH c plan to jump in front of train. Pt does not appear at all suicidal today or even depressed. No manic or psychotic sx. Saying he is hearing voices but does not seem internally preoccupied or disturbed by voices. Also reporting VH (says he saw a unicorn yesterday but it disappeared). He is without psychiatric sx at this time and wants discharge. Says he will go to his mom's house near [place], [place] but then doesn't want team to call mother. Admitted to medicine because he had elevated pulse and SBP last night. No signs of wdl at this time but did voluntarily switch his hands as he asked me for another librium for the road.    1)Pt safe for discharge as he is not a danger to self or others. Has retracted SI and wants to leave but requesting metro cards and perhaps help with getting to [place]. Refusing to give team permission to call mother.     2)Given referrals to [place] outpatient clinics and especially recommended [facility] at [address] [place], [place] [address] [phone].     3)No meds given as his adherence is terrible and I think he may not need meds as substance induced mood d/o seems most likely diagnosis. If he wants medication, he needs to go to outpatient clinic and build a relationship with an outpatient doctor.

## 2018-12-27 NOTE — H&P ADULT - PROBLEM SELECTOR PLAN 1
Per report, pt presenting with tachycardia, +ETOH level, and last drink on 12/25.   Pt given 12 total ativan IV, 50 librium po. CIWA improved from ~21 at arrival to 15 upon ICU consult, but some points thought to be attributed to psychiatric illness.  -Pt seen by ICU due to increasing need for Ativan.   -C/w Librium 50mg q8   -C/w Ativan 2mg q4h prn for CIWA scores >8.  -CIWA protocol   -banana bag ordered  -Start PO MVI, folic acid, B12 once banana bag completed Per report, pt presenting with tachycardia, +ETOH level, and last drink on 12/25.   Pt given 12 total ativan IV, 50 librium po. CIWA improved from ~21 at arrival to 15 upon ICU consult, but some points thought to be attributed to psychiatric illness.  -Pt seen by ICU due to increasing need for Ativan.   -Will hold off on standing Librium at this time, as CIWA currently 0, and unclear history of alcoholism/withdrawal, likely difficult to differentiate from underlying bipolar disorder and anxiety.   -C/w Ativan 2mg q4h prn for CIWA scores >8.  -CIWA protocol   -Start banana bag at 125cc/hr   -Start PO MVI, folic acid, B12 once banana bag completed

## 2018-12-27 NOTE — H&P ADULT - NSHPPHYSICALEXAM_GEN_ALL_CORE
.  VITAL SIGNS:  T(C): 37.2 (12-26-18 @ 23:28), Max: 37.2 (12-26-18 @ 23:28)  T(F): 99 (12-26-18 @ 23:28), Max: 99 (12-26-18 @ 23:28)  HR: 123 (12-26-18 @ 23:28) (119 - 130)  BP: 132/80 (12-26-18 @ 23:28) (127/73 - 150/85)  BP(mean): --  RR: 18 (12-26-18 @ 23:28) (18 - 18)  SpO2: 98% (12-26-18 @ 23:28) (97% - 98%)  Wt(kg): --    PHYSICAL EXAM:    Constitutional: sedated, but easily arousable to name and minor shaking.  Head: NC/AT  Eyes: PERRL, EOMI, clear conjunctiva  ENT: no nasal discharge; uvula midline, no oropharyngeal erythema or exudates; MMM  Neck: supple; no JVD or thyromegaly  Respiratory: CTA B/L; no W/R/R, no retractions  Cardiac: +S1/S2; RRR; no M/R/G; PMI non-displaced  Gastrointestinal: soft, NT/ND; no rebound or guarding; +BSx4  Genitourinary: normal external genitalia  Back: spine midline, no bony tenderness or step-offs; no CVAT B/L  Extremities: WWP, no clubbing or cyanosis; no peripheral edema  Musculoskeletal: NROM x4; no joint swelling, tenderness or erythema  Vascular: 2+ radial, femoral, DP/PT pulses B/L  Dermatologic: skin warm, dry and intact; no rashes, wounds, or scars  Neurologic: AAOx3; CNII-XII grossly intact; no focal deficits  Psychiatric: affect and characteristics of appearance, verbalizations, behaviors are appropriate .  VITAL SIGNS:  T(C): 37.2 (12-26-18 @ 23:28), Max: 37.2 (12-26-18 @ 23:28)  T(F): 99 (12-26-18 @ 23:28), Max: 99 (12-26-18 @ 23:28)  HR: 123 (12-26-18 @ 23:28) (119 - 130)  BP: 132/80 (12-26-18 @ 23:28) (127/73 - 150/85)  BP(mean): --  RR: 18 (12-26-18 @ 23:28) (18 - 18)  SpO2: 98% (12-26-18 @ 23:28) (97% - 98%)  Wt(kg): --    PHYSICAL EXAM:    Constitutional: sedated, but easily arousable to name and minor shaking.  Head: NC/AT  Eyes: PERRL, EOMI, clear conjunctiva  ENT: no nasal discharge; uvula midline, no oropharyngeal erythema or exudates; MMM  Neck: supple; no JVD or thyromegaly  Respiratory: CTA B/L; no W/R/R, no retractions  Cardiac: +S1/S2; RRR; no M/R/G; PMI non-displaced  Gastrointestinal: soft, NT/ND; no rebound or guarding; +BSx4  Genitourinary: normal external genitalia  Back: spine midline, no bony tenderness or step-offs; no CVAT B/L  Extremities: WWP, no clubbing or cyanosis; no peripheral edema, +nicotine patch L shoulder   Musculoskeletal: NROM x4; no joint swelling, tenderness or erythema  Vascular: 2+ radial, femoral, DP/PT pulses B/L  Dermatologic: skin warm, dry and intact; no rashes, wounds, or scars  Neurologic: AAOx3; CNII-XII grossly intact; no focal deficits  Psychiatric: affect and characteristics of appearance, verbalizations, behaviors are appropriate

## 2018-12-27 NOTE — ED BEHAVIORAL HEALTH ASSESSMENT NOTE - SUICIDE RISK FACTORS
Access to means (pills, firearms, etc.)/Mood episode/Substance abuse/dependence/Agitation/severe anxiety/Global insomnia

## 2018-12-27 NOTE — ED BEHAVIORAL HEALTH ASSESSMENT NOTE - CURRENT MEDICATION
per chart from Oct 2018: "zyprexa 10mg daily, lamictal 100mg BID, Strattera 50mg daily, and atarax 50mg daily. States he is supposed to take diltiazem for HTN but is not currently."    per EM patient reported he was supposed to be taking olanzapine, clonidine, gabapentin but stopped either 4 days or 4 weeks ago.

## 2018-12-27 NOTE — DISCHARGE NOTE ADULT - PROVIDER TOKENS
FREE:[LAST:[[facility]],PHONE:[phone]],FAX:[phone]   [phone]    -],ADDRESS:[[place] [address]]] FREE:[LAST:[[facility]],PHONE:[phone]],FAX:[phone]   [phone]    -],ADDRESS:[[place] [address]]],TOKEN:'[id number]'

## 2018-12-27 NOTE — H&P ADULT - PROBLEM SELECTOR PLAN 4
Low improve score, no AC Pt was discharged from [facility] on 11/8 on Zyprexa. Unclear if he has been taking medication as he reports he ran out.   -Will hold off on restarting until psychiatry eval in AM

## 2018-12-27 NOTE — PROGRESS NOTE BEHAVIORAL HEALTH - NSBHCONSORIP_PSY_A_CORE
Airway patent, Nasal mucosa clear. Mouth with normal mucosa. Throat has no vesicles, no oropharyngeal exudates and uvula is midline. Consult...

## 2018-12-27 NOTE — DIETITIAN INITIAL EVALUATION ADULT. - PROBLEM SELECTOR PLAN 3
Pt was discharged from  on 11/8 on NRT gum 2mg q 2h prn, they were considering starting Naltrexone for ETOH abuse, however unclear if it was prescribed.  -C/w Nicotine patch   -Obtain collateral from Camilla consult in AM

## 2018-12-27 NOTE — PROGRESS NOTE BEHAVIORAL HEALTH - NSBHFUPSUICINTERVALFT_PSY_A_CORE
Came with plan to kill self by jumping in front of train but does not seem suicidal or even depressed. Seems to be feigning symptoms for admission for a place to stay. Abundant future-oriented thinking. Pretty much retracting all SI as interview progressed

## 2018-12-27 NOTE — ED BEHAVIORAL HEALTH ASSESSMENT NOTE - OTHER PAST PSYCHIATRIC HISTORY (INCLUDE DETAILS REGARDING ONSET, COURSE OF ILLNESS, INPATIENT/OUTPATIENT TREATMENT)
per chart from Oct 2018:. Patient reports he is currently in treatment with Psych NP Mr. Zapata (cannot remember first name).  States his pharmacy is [facility] in [place], [place] (delivers meds to his mom).

## 2018-12-27 NOTE — ED BEHAVIORAL HEALTH ASSESSMENT NOTE - OTHER
peers none. patient somnolent. tele patient somnolent. none reported to MD. per report stated depressed to EM. patient somnolent. sleeping reported AVH to EM by report. no report to this MD unable to comment on intelligence of sleeping patient

## 2018-12-27 NOTE — DIETITIAN INITIAL EVALUATION ADULT. - OTHER INFO
Nutrition consult for decreased PO intake >5 days PTA by RN appreciated. 33 y.o M from ADD, Bipolar Disorder, HTN, Polysubstance and ETOH abuse, Seizures. Pt p/w panic attacks, auditory/visual hallucinations, suicidal ideation. Pt eats regular diet, buy ready-prepared foods regularly, denies any problems acquiring food, good appetite, NKFA. Pt is currently on 1:1 observation for suicidal ideation. Pt is currently tolerating Regular diet well with good >75% PO intake of meals, endorses good appetite. Denies N/V/D/C or pain. +BM 12/27. Skin intact. No acute nutrition problems identified at this time.

## 2018-12-27 NOTE — H&P ADULT - ATTENDING COMMENTS
patient seen and examined; pt poor historian, stated that he last drank 1 week ago though +ETOH level ; pt reports hx of ETOH withdrawl in past but denies hallucinations, SI  though reported hallucinations/ SI to other providers ; pt at end of exam requested medication for anxiety, stated that he needed medication to help him sleep; when informed pt that he was already somnolent, pt stated that he needed medication for anxiety to keep awake and watch TV.     reviewed data including:  labs, available radiological reports/ studies, ekg, previous admission chart notes and/or imaging      agree w/ PE findings as above w/ additions/ exceptions/ pertinent findings: asleep but awakes to sternal rub, answers questions but falls back asleep; perrla, mmm, no tongue fasciculations; +mild b/l hand tremors ; somnolent but more awake at end of exam, strange affect     1. possible ETOH withdrawl: monitor CIWA, given librium and ativan in ED; given excess somnolence, and low CIWA, held librium and will use ativan prn for CIWA >8 for now;   2. psych evaluation in AM; transfer to [facility] once medically stable      rest of plan as above

## 2018-12-27 NOTE — PROGRESS NOTE BEHAVIORAL HEALTH - NSBHFUPINTERVALCCFT_PSY_A_CORE
32 YO M c malingering, purported bipolar disorder and polysubstance (etoh/cocaine/opioid/cannabis/caffeine) use d/o, and HTN came to ED c/o SI & AH c plan to jump in front of train. Pt does not appear at all suicidal today or even depressed. No manic or psychotic sx. Saying he is hearing voices but does not seem internally preoccupied or disturbed by voices. Also reporting VH (says he saw a unicorn yesterday but it disappeared). He is without psychiatric sx at this time and wants discharge. Says he will go to his mom's house near [place], [place] but then doesn't want team to call mother. Admitted to medicine because he had elevated pulse and SBP last night. No signs of wdl at this time but did voluntarily switch his hands as he asked me for another librium for the road.

## 2018-12-27 NOTE — ED BEHAVIORAL HEALTH ASSESSMENT NOTE - RISK ASSESSMENT
Risk factors include current suicidal ideation, substance intoxication, and noncompliance with treatment. Further safety assessment required

## 2018-12-27 NOTE — PROGRESS NOTE ADULT - PROBLEM SELECTOR PLAN 5
Regular diet  Replete electrolytes  s/p 1L IVF Pt was discharged from [facility] on 11/8 on Zyprexa.  Patient states he takes Zyprexa 5mg in the am and 10mg in the pm as well as wellbutrin 300mg  -Will hold off on restarting until psychiatry eval

## 2018-12-27 NOTE — PROGRESS NOTE BEHAVIORAL HEALTH - NSBHADMITCOUNSEL_PSY_A_CORE
importance of adherence to chosen treatment/client/family/caregiver education/diagnostic results/impressions and/or recommended studies/risks and benefits of treatment options/risk factor reduction

## 2018-12-27 NOTE — ED BEHAVIORAL HEALTH ASSESSMENT NOTE - DESCRIPTION (FIRST USE, LAST USE, QUANTITY, FREQUENCY, DURATION)
see HPI chart notes pattern of heavy use. patient unable to provide any report to this MD. Per chart hx of use per chart hx of heroin insufflation, methadone abuse hx bzd abuse hx of K2 abuse, caffeine abuse (liters of coffee plus red bulls as cocaine substitute per chart)

## 2018-12-27 NOTE — H&P ADULT - ASSESSMENT
33M w/PMHx of polysubstance abuse (heroin, methadone, K2, cocaine), bipolar disorder type 1, ADHD, seizures, presents to the ED for panic attacks, auditory/visual hallucinations, and suicidal ideation, admitted to monitor for ETOH withdrawal and SI, currently on 1:1.

## 2018-12-27 NOTE — DIETITIAN INITIAL EVALUATION ADULT. - PATIENT MEETING ESTIMATED NUTRIENT NEEDS
Refer To    ORTHOPEDICS REFER TO:  [facility], DR. POWERS  [address], [phone]  [address], [address], [place] Office [phone]   [facility]; [address], [address], [place] Office [phone] fax [phone]   Referral for treatment   Reason for Referral: Left Knee Pain/OA   # of Visits: 3     Signatures   Electronically signed by : JEAN CHENG MD; Aug 28 2018 10:41AM CST    
Statement Selected

## 2018-12-27 NOTE — PROGRESS NOTE ADULT - PROBLEM SELECTOR PLAN 3
Pt was discharged from  on 11/8 on NRT gum 2mg q 2h prn, they were considering starting Naltrexone for ETOH abuse, however unclear if it was prescribed.  -C/w Nicotine patch   -pending Camilla ferreira

## 2018-12-27 NOTE — ED BEHAVIORAL HEALTH ASSESSMENT NOTE - HPI (INCLUDE ILLNESS QUALITY, SEVERITY, DURATION, TIMING, CONTEXT, MODIFYING FACTORS, ASSOCIATED SIGNS AND SYMPTOMS)
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 Patient presented today for suicidal ideation and anxiety after stopping ETOH and stopping psych meds.    Patient had VS abnormalities on arrival, per ED high suspicion for ETOH withdrawal and was treated for same with BZDs prior to psych consult.     Patient visualized via telepsych. He presents as sedated, does not arouse to voice or to 1:1 staff attempting to rouse him.      Chart reviewed. Of note this assessment draws heavily on charted hx as patient does not provide any information to this MD.       Attempt made to contact pt's mother, Arnel Paige  at [phone]. VM  was left requesting call back.    Psyckes: Diagnoses: of stimulant use, opioid use, other psychostimulant use, cocaine use, alcohol use - along with unspecified depressive/bipolar/psychosis; multiple psych admissions along with ER visits without admissions.

## 2018-12-27 NOTE — H&P ADULT - PROBLEM SELECTOR PLAN 2
Pt seen by tele psych, however unable to complete evaluation due to sedation.  Per note, patient cannot be psychiatrically cleared at this time. he should remain on 1:1 for reported suicidal ideation, and C/L psychiatry consultation should be placed on medical floor.   -Consult psychiatry in AM, likely will need inpt admission to [facility]  -C/w 1:1 on RMF  -Hold off on restarting any medications for now

## 2018-12-27 NOTE — H&P ADULT - HISTORY OF PRESENT ILLNESS
History obtained from ICU consult and ED Behavioral assessment note as pt sedated, but easily arousable to physical and verbal stimuli, knows he is hat [facility] and can answer only basic questions.   33M, Vietnamese, single, domiciled at 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 He presented to SI and anxiety today after stopping ETOH and psych meds. He states that for the last three weeks, he has been having worsening panic attacks and hallucinations of both the auditory and visual variety. Today he saw a unicorn on the sidewalk, but when he went up to pet it, it wasn't really there. He also heard a voice today, telling him to jump in front of a car. That is when he came into the ER. He otherwise denies fever, chills, lightheadedness, CP, palpitations, SOB, cough, URI symptoms, N/V/D/C, abdominal pain, dysuria, hematuria, changes in bowel habits, LE edema. He says he ran out of his medications recently. He takes zyprexa 5 in the morning, 10 at night, wellbutrin, ativan, and klonopin. He drinks one beer every several days. Last drink was yesterday (12/25).       In the ED, VS T 98, , bp 127/73, RR 18, O2 97 on RA. Labs significant for BAL 68 and UTox positive for THC. Was given Ativan 2mg IVP x 2, gabapentin 600mg x 1, and 1L NS bolus x 2, Ativan 4mg IVP x2. No CIWA was done initially History obtained from ICU consult and ED Behavioral assessment note as pt sedated, but easily arousable to physical and verbal stimuli, knows he is hat [facility] and can answer only basic questions.   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 He presented to SI and anxiety today after stopping ETOH and psych meds. He states that for the last three weeks, he has been having worsening panic attacks and hallucinations of both the auditory and visual variety. Today he saw a unicorn on the sidewalk, but when he went up to pet it, it wasn't really there. He also heard a voice today, telling him to jump in front of a car. That is when he came into the ER. He otherwise denies fever, chills, lightheadedness, CP, palpitations, SOB, cough, URI symptoms, N/V/D/C, abdominal pain, dysuria, hematuria, changes in bowel habits, LE edema. He says he ran out of his medications recently. He takes zyprexa 5 in the morning, 10 at night, wellbutrin, ativan, and klonopin. He drinks one beer every several days. Last drink was yesterday (12/25). Normally drinks daily, last etoh yesterday 2 x 24oz beers.      In the ED, VS T 98, , bp 127/73, RR 18, O2 97 on RA. Labs significant for BAL 68 and UTox positive for THC. Was given Ativan 2mg IVP x 2, gabapentin 600mg x 1, and 1L NS bolus x 2, Ativan 4mg IVP x2. No CIWA was done initially History obtained from ICU consult and ED Behavioral assessment note as pt sedated, but easily arousable to physical and verbal stimuli, knows he is hat [facility] and can answer only basic questions. However, during questioning, he reports he is at [facility] to detox from alcohol. He reports he is homeless, but in touch with his parents who live in [place]. He reports being diagnosed with MDD and was on Zyprexa and Wellbutrin up until recently.     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 last month w/suicidal ideation,  recent hx of active use of multiple substances, and   PMhx of HTN. He presented to SI and anxiety today after stopping ETOH and psych meds. He states that for the last three weeks, he has been having worsening panic attacks and hallucinations of both the auditory and visual variety. Today he saw a unicorn on the sidewalk, but when he went up to pet it, it wasn't really there. He also heard a voice today, telling him to jump in front of a car. That is when he came into the ER. He otherwise denies fever, chills, lightheadedness, CP, palpitations, SOB, cough, URI symptoms, N/V/D/C, abdominal pain, dysuria, hematuria, changes in bowel habits, LE edema. He says he ran out of his medications recently. He takes zyprexa 5 in the morning, 10 at night, wellbutrin, ativan, and klonopin. He drinks one beer every several days. Last drink was yesterday (12/25). Normally drinks daily, last etoh yesterday 2 x 24oz beers.    Of note, he has had psychiatric admissions in the past at [facility] for SI with command hallucinations to kill himself, recent discharge on 11/8. He had not used his psych meds (zyprexa, lamictal, atarax, and strattera) and felt worse as he used more substances. Was also recently at a long term rehab facility [facility].    In the ED, VS T 98, , bp 127/73, RR 18, O2 97 on RA. Labs significant for BAL 68 and UTox positive for THC. Was given Ativan 2mg IVP x 2, gabapentin 600mg x 1, and 1L NS bolus x 2, Ativan 4mg IVP x2. MICU consulted in ED for high CIWA of 21 on arrival and increasing requirements for Ativan.

## 2018-12-27 NOTE — DISCHARGE NOTE ADULT - CARE PROVIDER_API CALL
[facility],   [facility]  [address] [place] [address]  Phone: [phone]  Fax: (       - [facility],   [facility]  [address] [place] [address]  Phone: [phone]  Fax: (   )    -    Oleg Logan), Internal Medicine  [address] [place] [address]  Phone: [phone]  Fax: [phone]

## 2018-12-27 NOTE — H&P ADULT - PROBLEM SELECTOR PLAN 7
1) PCP Contacted on Admission: (N) --> Name & Phone #: NO PCP  2) Date of Contact with PCP:   3) PCP Contacted at Discharge: (Y/N)  4) Summary of Handoff Given to PCP:   5) Post-Discharge Appointment Date and Location:

## 2018-12-27 NOTE — PROGRESS NOTE ADULT - PROBLEM SELECTOR PLAN 1
Per report, pt presenting with tachycardia, +ETOH level, and last drink on 12/25.   Pt given 12 total ativan IV, 50 librium po. CIWA improved from ~21 at arrival to 15 upon ICU consult, but some points thought to be attributed to psychiatric illness.  -Pt seen by ICU due to increasing need for Ativan.   -Will hold off on standing Librium at this time, as CIWA currently 0, and unclear history of alcoholism/withdrawal, likely difficult to differentiate from underlying bipolar disorder and anxiety.   -C/w Ativan 2mg q4h prn for CIWA scores >8.  -CIWA protocol   -Start banana bag at 125cc/hr   -Start PO MVI, folic acid, B12 once banana bag completed

## 2018-12-27 NOTE — ED BEHAVIORAL HEALTH ASSESSMENT NOTE - DESCRIPTION
per EM report patient was calm and conversational prior to consultation. he was tachycardic so bzds were administered. 1:1 reports that patient has just fallen asleep when MD calls in via tele. per chart from Oct 2018: history of seizures, HTN see HPI

## 2018-12-27 NOTE — ED BEHAVIORAL HEALTH ASSESSMENT NOTE - PSYCHIATRIC ISSUES AND PLAN (INCLUDE STANDING AND PRN MEDICATION)
hold zyprexa for now as last dose date is unknown, sx report cannot be obtained at this time, and patient is already sedated, and AVH in setting of ETOH withdrawal should be managed as such (ie with CIWA protocol/BZDs)

## 2018-12-27 NOTE — DISCHARGE NOTE ADULT - PLAN OF CARE
Continued abstinence Please continue to abstain from alcohol ingestion as it can lead to life-threatening withdrawal. Please see your primary care doctor at your earliest convenience and follow-up at the [facility] which is listed on this document for further psychiatric care. Control of symptoms Please see your primary care doctor at your earliest convenience and follow-up at the [facility] which is listed on this document for further psychiatric care. Please see your primary care doctor at your earliest convenience and follow-up at the [facility] which is listed on this document for further psychiatric care. Please continue to abstain from alcohol ingestion and drug use as it can be dangerous and lead to life-threatening withdrawal. Please see your primary care doctor at your earliest convenience and follow-up at the [facility] which is listed on this document for further psychiatric care. Your heart rate has been fast during multiple hospitalizations, however, initial screening does not show any sign of heart disease. Please see your primary care doctor at your earliest convenience and ensure your heart rate and blood pressure are monitored over time and you can be referred to a cardiologist if needed.

## 2018-12-27 NOTE — PROGRESS NOTE ADULT - ATTENDING COMMENTS
pt seen at 1130am    he is feeling well  feels slightly anxious  denies SI/HI  denies headaches, diaphoresis, n/v, hallucinations, cough  states he started drinking yesterday (had 2 bottles of  beer) after being sober for 2 wks prior.   denies palpitations /leg pain or swelling or chest pain  or dyspnea. ambulating w/o difficulty.   on exam , he had voluntary twitching of hands, which disappeared when distracted.     a/  sinus tachycardia (chronic) - he does NOT appear to be in etoh w/drawal. ciwas are (+) for anxiety. he denies other sxs. upon review of old records , pt is persistently tachycardic (sinus). he has no pulmonary or cardiac complaints. doubt PE. no e/o of infection.    etoh abuse  polysubstance abuse  bipolar d/o  suicidal ideations    p/  based on hx and exam,  pt is not in etoh w/drawal. dc benzodiazepines  cleared for dc by psych  needs outpt psych and pcp follow up   abstinence counseling given for drug and alcohol use  sw referral
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
## 2018-12-27 NOTE — PROGRESS NOTE BEHAVIORAL HEALTH - NSBHCONSULTRECOMMENDOTHER_PSY_A_CORE FT
1)Pt safe for discharge as he is not a danger to self or others. Has retracted SI and wants to leave but requesting metro cards and perhaps help with getting to [place]. Refusing to give team permission to call mother.     2)Given referrals to [facility] and especially recommended [facility] at [address] [place] [address] [phone].

## 2018-12-27 NOTE — DISCHARGE NOTE ADULT - ADDITIONAL INSTRUCTIONS
Please follow-up at the [facility] at [address] [place] [address], [place] [address] at your earliest convenience. Please follow-up at the [facility] at [address] [place] [phone] at your earliest convenience.    The [facility] will reach out to you to schedule an appointment at your convenience.

## 2018-12-27 NOTE — PROGRESS NOTE BEHAVIORAL HEALTH - NS ED BHA MED ROS CARDIOVASCULAR
PEDRO/STACIE and Tdap today and she wants her c/section for 17 will send request    Recommending surgery be scheduled between 17- w who ever is on call for US :  - Procedure(s): [facility],  - [id number]  - Facility:  [facility]  - Length of Procedure:  1 hour  - Assistant: Available partner/ or in house  - Type of Admit:  inpatient  - Preoperative Visit: No  - Medical Clearance from primary medical doctor: No  - Bowel Prep Needed?  no  - Special Equipment: No  - Antibiotics: Cefazolin 2 gm IV  - Orders on Admission:   type & screen  sequential compression device  Thank U   No complaints

## 2018-12-27 NOTE — ED BEHAVIORAL HEALTH ASSESSMENT NOTE - SUBSTANCE ISSUES AND PLAN (INCLUDE STANDING AND PRN MEDICATION)
mgmt of ETOH withdrawal as per EM mgmt of ETOH withdrawal as per medicine. no acute intervention indicated for positive THC or for suspected recent K2 use.

## 2018-12-27 NOTE — PROGRESS NOTE BEHAVIORAL HEALTH - NSBHFUPINTERVALHXFT_PSY_A_CORE
Last time I saw him in ED he had more manic symptoms (pressured speech, circumstantial thought process) and he was admitted to [facility] where he stayed for about 10 days. He was given zyprexa for "mood", neurontin for anxiety, and clonidine for HTN. Patient very nonadherent to meds and outpatient treatment and I also believe he may not need psychiatric meds. Substance induced mood d/o may be more accurate. Discussed this with patient and gave him referrals to outpatient clinics and detoxes in [place], singling out [facility] in [place]. Pt says he just completed 14 day rehab at [facility], but he seems to be lying constantly and changing details of his story. He told me at one point he spent Xmas in [facility] then said he was discharged 3 wks ago. Pt seems to be making up parts of his story as he tells it.

## 2018-12-27 NOTE — ED BEHAVIORAL HEALTH ASSESSMENT NOTE - SUMMARY
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 Patient presented today for suicidal ideation and anxiety after stopping ETOH and stopping psych meds.    He is not able to participate in full psych eval.     Per EM patient to be admitted to medicine for ETOH withdrawal.     Given suicidal ideation and AVH reported to EM but patient cannot be fully evaluated and no collateral is available, patient cannot be psychiatrically cleared at this time. he should remain on 1:1 for reported suicidal ideation, and C/L psychiatry consultation should be placed on medical floor.     Would not restart zyprexa at this moment as patient is sedated, and reported AVH/SI cannot be assessed at this time. They can be assumed to be primarily due to ETOH withdrawal at this time and should be managed as such.

## 2018-12-27 NOTE — H&P ADULT - NSHPLABSRESULTS_GEN_ALL_CORE
.  LABS:                         14.4   9.9   )-----------( 333      ( 26 Dec 2018 21:53 )             42.6         138  |  97  |  11  ----------------------------<  94  4.2   |  24  |  0.68    Ca    9.2      26 Dec 2018 21:53  Phos  3.7       Mg     2.1         TPro  7.4  /  Alb  4.5  /  TBili  0.2  /  DBili  x   /  AST  22  /  ALT  37  /  AlkPhos  54        Urinalysis Basic - ( 26 Dec 2018 21:53 )    Color: Yellow / Appearance: Clear / S.015 / pH: x  Gluc: x / Ketone: NEGATIVE  / Bili: Negative / Urobili: 0.2 E.U./dL   Blood: x / Protein: NEGATIVE mg/dL / Nitrite: NEGATIVE   Leuk Esterase: NEGATIVE / RBC: x / WBC x   Sq Epi: x / Non Sq Epi: x / Bacteria: x      CARDIAC MARKERS ( 26 Dec 2018 21:53 )  x     / <0.01 ng/mL / x     / x     / x                RADIOLOGY, EKG & ADDITIONAL TESTS: Reviewed.  EKG: sinus tachycardia, no signs of ischemia

## 2018-12-27 NOTE — PROGRESS NOTE ADULT - PROBLEM SELECTOR PLAN 2
Pt seen by tele psych, however unable to complete evaluation due to sedation.  Per note, patient cannot be psychiatrically cleared at this time. he should remain on 1:1 for reported suicidal ideation, and C/L psychiatry consultation should be placed on medical floor.   -Psych recs appreciated. likely will need inpt admission to [facility]  -C/w 1:1 on RMF  -Hold off on restarting any medications for now

## 2018-12-27 NOTE — PROGRESS NOTE ADULT - PROBLEM SELECTOR PLAN 4
Pt was discharged from [facility] on 11/8 on Zyprexa.  Patient states he takes Zyprexa 5mg in the am and 10mg in the pm as well as wellbutrin 300mg  -Will hold off on restarting until psychiatry eval Patient complaining of fast heart rate and chest pain described as "the same kind of pain you get after smoking a lot".   -EKG shows sinus tach  -review of records show persistent hx of sinus tach may be 2/2 to anxiety.

## 2018-12-27 NOTE — ED BEHAVIORAL HEALTH ASSESSMENT NOTE - DETAILS
per chart from Oct 2018: Reports past attempts. See HPI for most recent attempt. Also states over 2 years ago "took Tylenol" but no medical treatment, reports "heroin overdose" and "trying to hang self" in Jan 2017 charted history of DT/seizures "restless legs" on Risperdal- had to go to ED for cogentin IM. patient unable to provide ROS in AM s/o unknown

## 2018-12-27 NOTE — DIETITIAN INITIAL EVALUATION ADULT. - ENERGY NEEDS
Height: 5'3" Weight: 179 lbs,  lbs,  lbs+/-10%, %%, BMI 30,  IBW used to calculate EER as per pt's current body weight >120% of IBW  Estimated nutrient needs based on [facility] SOC for maintenance in adults adjusted for obesity

## 2018-12-27 NOTE — PROGRESS NOTE BEHAVIORAL HEALTH - OTHER
not assessed reported AVH but not internally preoccupied or at all disturbed by them, likely malingering

## 2018-12-27 NOTE — DIETITIAN INITIAL EVALUATION ADULT. - PROBLEM SELECTOR PLAN 4
Pt was discharged from [facility] on 11/8 on Zyprexa. Unclear if he has been taking medication as he reports he ran out.   -Will hold off on restarting until psychiatry eval in AM

## 2018-12-27 NOTE — DISCHARGE NOTE ADULT - PATIENT PORTAL LINK FT
You can access the [facility] Patient Portal, offered by [facility], by registering with the following website: [email or web address]

## 2018-12-27 NOTE — DISCHARGE NOTE ADULT - MEDICATION SUMMARY - MEDICATIONS TO STOP TAKING
I will STOP taking the medications listed below when I get home from the hospital:    cloNIDine 0.1 mg oral tablet  -- 1 tab(s) by mouth once a day    gabapentin 600 mg oral tablet  -- 1 tab(s) by mouth 3 times a day    OLANZapine 10 mg oral tablet  -- 1 tab(s) by mouth once a day (at bedtime)    Nicorette 2 mg oral transmucosal gum  -- 2 gum chewed every 2 hours   -- Do not take this drug if you are pregnant.  It is very important that you take or use this exactly as directed.  Do not skip doses or discontinue unless directed by your doctor.    OLANZapine 5 mg oral tablet  -- 1 tab(s) by mouth once a day   -- It is very important that you take or use this exactly as directed.  Do not skip doses or discontinue unless directed by your doctor.  May cause drowsiness.  Alcohol may intensify this effect.  Use care when operating dangerous machinery.  Obtain medical advice before taking any non-prescription drugs as some may affect the action of this medication.    ZyPREXA

## 2018-12-27 NOTE — H&P ADULT - PROBLEM SELECTOR PLAN 3
NPO for now until pt less sedated  Replete electrolytes  s/p 1L IVF Pt was discharged from  on 11/8 on NRT gum 2mg q 2h prn, they were considering starting Naltrexone for ETOH abuse, however unclear if it was prescribed.  -C/w Nicotine patch   -Obtain collateral from [facility] consult in AM

## 2018-12-27 NOTE — DISCHARGE NOTE ADULT - HOSPITAL COURSE
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 Presented with anxiety after stopping ETOH and psych meds. Patient initially treated for EtOH withdrawal, but after serial assessments was found to have a maximum CIWA of 4 confounded by patient's baseline status. Patient was evaluated by psychiatry who recommended discharge without initiation of new medications. Patient deemed safe for discharge home on 12/27/18 with psychiatric follow-up.

## 2018-12-30 DIAGNOSIS — F19.10 OTHER PSYCHOACTIVE SUBSTANCE ABUSE, UNCOMPLICATED: ICD-10-CM

## 2018-12-30 DIAGNOSIS — F98.8 OTHER SPECIFIED BEHAVIORAL AND EMOTIONAL DISORDERS WITH ONSET USUALLY OCCURRING IN CHILDHOOD AND ADOLESCENCE: ICD-10-CM

## 2018-12-30 DIAGNOSIS — F17.210 NICOTINE DEPENDENCE, CIGARETTES, UNCOMPLICATED: ICD-10-CM

## 2018-12-30 DIAGNOSIS — F31.9 BIPOLAR DISORDER, UNSPECIFIED: ICD-10-CM

## 2018-12-30 DIAGNOSIS — F41.0 PANIC DISORDER [EPISODIC PAROXYSMAL ANXIETY]: ICD-10-CM

## 2018-12-30 DIAGNOSIS — R00.0 TACHYCARDIA, UNSPECIFIED: ICD-10-CM

## 2018-12-30 DIAGNOSIS — R56.9 UNSPECIFIED CONVULSIONS: ICD-10-CM

## 2018-12-30 DIAGNOSIS — F10.232 ALCOHOL DEPENDENCE WITH WITHDRAWAL WITH PERCEPTUAL DISTURBANCE: ICD-10-CM

## 2018-12-30 DIAGNOSIS — R45.851 SUICIDAL IDEATIONS: ICD-10-CM

## 2018-12-30 DIAGNOSIS — F12.10 CANNABIS ABUSE, UNCOMPLICATED: ICD-10-CM

## 2019-01-07 ENCOUNTER — APPOINTMENT (OUTPATIENT)
Dept: INTERNAL MEDICINE | Facility: CLINIC | Age: 34
End: 2019-01-07

## 2019-01-09 PROCEDURE — 96376 TX/PRO/DX INJ SAME DRUG ADON: CPT

## 2019-01-09 PROCEDURE — 82962 GLUCOSE BLOOD TEST: CPT

## 2019-01-09 PROCEDURE — 99285 EMERGENCY DEPT VISIT HI MDM: CPT | Mod: 25

## 2019-01-09 PROCEDURE — 93005 ELECTROCARDIOGRAM TRACING: CPT

## 2019-01-09 PROCEDURE — 71046 X-RAY EXAM CHEST 2 VIEWS: CPT

## 2019-01-09 PROCEDURE — 80053 COMPREHEN METABOLIC PANEL: CPT

## 2019-01-09 PROCEDURE — 83735 ASSAY OF MAGNESIUM: CPT

## 2019-01-09 PROCEDURE — 80307 DRUG TEST PRSMV CHEM ANLYZR: CPT

## 2019-01-09 PROCEDURE — 96374 THER/PROPH/DIAG INJ IV PUSH: CPT

## 2019-01-09 PROCEDURE — 81003 URINALYSIS AUTO W/O SCOPE: CPT

## 2019-01-09 PROCEDURE — 84443 ASSAY THYROID STIM HORMONE: CPT

## 2019-01-09 PROCEDURE — 84100 ASSAY OF PHOSPHORUS: CPT

## 2019-01-09 PROCEDURE — 36415 COLL VENOUS BLD VENIPUNCTURE: CPT

## 2019-01-09 PROCEDURE — 84484 ASSAY OF TROPONIN QUANT: CPT

## 2019-01-09 PROCEDURE — 85025 COMPLETE CBC W/AUTO DIFF WBC: CPT

## 2019-03-22 ENCOUNTER — EMERGENCY (EMERGENCY)
Facility: HOSPITAL | Age: 34
LOS: 1 days | Discharge: LEFT BEFORE DISCHARGE | End: 2019-03-22
Admitting: EMERGENCY MEDICINE

## 2019-03-22 VITALS
DIASTOLIC BLOOD PRESSURE: 135 MMHG | RESPIRATION RATE: 18 BRPM | SYSTOLIC BLOOD PRESSURE: 148 MMHG | TEMPERATURE: 99 F | OXYGEN SATURATION: 98 % | HEART RATE: 134 BPM

## 2019-03-22 NOTE — ED ADULT TRIAGE NOTE - CHIEF COMPLAINT QUOTE
Pt brought in by EMS from [facility] with "hyperactivity & elevated HR.. pt pacing back & forth & diaphoretic." Unknown substance/drug taken - states, "I'm just anxious because I haven't taken my usual medications." EMS states per [place] he usually take "xanax." Pt with pinpoint pupils. States pain all over body & palp. Pt fidgeting in triage. Blue pill found on pt - possibly xanax.

## 2019-03-23 VITALS
OXYGEN SATURATION: 95 % | RESPIRATION RATE: 16 BRPM | DIASTOLIC BLOOD PRESSURE: 107 MMHG | SYSTOLIC BLOOD PRESSURE: 157 MMHG | HEART RATE: 136 BPM | TEMPERATURE: 98 F

## 2019-03-30 ENCOUNTER — EMERGENCY (EMERGENCY)
Facility: HOSPITAL | Age: 34
LOS: 1 days | Discharge: HOME | End: 2019-03-30
Attending: EMERGENCY MEDICINE | Admitting: EMERGENCY MEDICINE
Payer: COMMERCIAL

## 2019-03-30 VITALS
HEART RATE: 124 BPM | WEIGHT: 177.91 LBS | SYSTOLIC BLOOD PRESSURE: 157 MMHG | TEMPERATURE: 99 F | DIASTOLIC BLOOD PRESSURE: 108 MMHG | RESPIRATION RATE: 20 BRPM | OXYGEN SATURATION: 95 %

## 2019-03-30 VITALS
RESPIRATION RATE: 18 BRPM | SYSTOLIC BLOOD PRESSURE: 139 MMHG | HEART RATE: 98 BPM | OXYGEN SATURATION: 97 % | DIASTOLIC BLOOD PRESSURE: 90 MMHG

## 2019-03-30 DIAGNOSIS — F19.10 OTHER PSYCHOACTIVE SUBSTANCE ABUSE, UNCOMPLICATED: ICD-10-CM

## 2019-03-30 DIAGNOSIS — I10 ESSENTIAL (PRIMARY) HYPERTENSION: ICD-10-CM

## 2019-03-30 DIAGNOSIS — R00.2 PALPITATIONS: ICD-10-CM

## 2019-03-30 DIAGNOSIS — F11.10 OPIOID ABUSE, UNCOMPLICATED: ICD-10-CM

## 2019-03-30 DIAGNOSIS — F15.10 OTHER STIMULANT ABUSE, UNCOMPLICATED: ICD-10-CM

## 2019-03-30 LAB
ALBUMIN SERPL ELPH-MCNC: 3.8 G/DL — SIGNIFICANT CHANGE UP (ref 3.3–5)
ALBUMIN SERPL ELPH-MCNC: 4.3 G/DL — SIGNIFICANT CHANGE UP (ref 3.3–5)
ALP SERPL-CCNC: 43 U/L — SIGNIFICANT CHANGE UP (ref 40–120)
ALP SERPL-CCNC: SIGNIFICANT CHANGE UP U/L (ref 40–120)
ALT FLD-CCNC: 18 U/L — SIGNIFICANT CHANGE UP (ref 10–45)
ALT FLD-CCNC: SIGNIFICANT CHANGE UP U/L (ref 10–45)
ANION GAP SERPL CALC-SCNC: 10 MMOL/L — SIGNIFICANT CHANGE UP (ref 5–17)
ANION GAP SERPL CALC-SCNC: 12 MMOL/L — SIGNIFICANT CHANGE UP (ref 5–17)
APPEARANCE UR: CLEAR — SIGNIFICANT CHANGE UP
AST SERPL-CCNC: 15 U/L — SIGNIFICANT CHANGE UP (ref 10–40)
AST SERPL-CCNC: SIGNIFICANT CHANGE UP U/L (ref 10–40)
BASOPHILS # BLD AUTO: 0.05 K/UL — SIGNIFICANT CHANGE UP (ref 0–0.2)
BASOPHILS NFR BLD AUTO: 0.4 % — SIGNIFICANT CHANGE UP (ref 0–2)
BILIRUB SERPL-MCNC: 0.2 MG/DL — SIGNIFICANT CHANGE UP (ref 0.2–1.2)
BILIRUB SERPL-MCNC: 0.3 MG/DL — SIGNIFICANT CHANGE UP (ref 0.2–1.2)
BILIRUB UR-MCNC: NEGATIVE — SIGNIFICANT CHANGE UP
BUN SERPL-MCNC: 14 MG/DL — SIGNIFICANT CHANGE UP (ref 7–23)
BUN SERPL-MCNC: 18 MG/DL — SIGNIFICANT CHANGE UP (ref 7–23)
CALCIUM SERPL-MCNC: 8.1 MG/DL — LOW (ref 8.4–10.5)
CALCIUM SERPL-MCNC: 9.4 MG/DL — SIGNIFICANT CHANGE UP (ref 8.4–10.5)
CHLORIDE SERPL-SCNC: 102 MMOL/L — SIGNIFICANT CHANGE UP (ref 96–108)
CHLORIDE SERPL-SCNC: 110 MMOL/L — HIGH (ref 96–108)
CO2 SERPL-SCNC: 23 MMOL/L — SIGNIFICANT CHANGE UP (ref 22–31)
CO2 SERPL-SCNC: 24 MMOL/L — SIGNIFICANT CHANGE UP (ref 22–31)
COLOR SPEC: YELLOW — SIGNIFICANT CHANGE UP
CREAT SERPL-MCNC: 0.59 MG/DL — SIGNIFICANT CHANGE UP (ref 0.5–1.3)
CREAT SERPL-MCNC: 0.65 MG/DL — SIGNIFICANT CHANGE UP (ref 0.5–1.3)
DIFF PNL FLD: NEGATIVE — SIGNIFICANT CHANGE UP
EOSINOPHIL # BLD AUTO: 0.16 K/UL — SIGNIFICANT CHANGE UP (ref 0–0.5)
EOSINOPHIL NFR BLD AUTO: 1.3 % — SIGNIFICANT CHANGE UP (ref 0–6)
ETHANOL SERPL-MCNC: <10 MG/DL — SIGNIFICANT CHANGE UP (ref 0–10)
GLUCOSE SERPL-MCNC: 101 MG/DL — HIGH (ref 70–99)
GLUCOSE SERPL-MCNC: 141 MG/DL — HIGH (ref 70–99)
GLUCOSE UR QL: NEGATIVE — SIGNIFICANT CHANGE UP
HCT VFR BLD CALC: 42.2 % — SIGNIFICANT CHANGE UP (ref 39–50)
HGB BLD-MCNC: 14.1 G/DL — SIGNIFICANT CHANGE UP (ref 13–17)
IMM GRANULOCYTES NFR BLD AUTO: 0.5 % — SIGNIFICANT CHANGE UP (ref 0–1.5)
KETONES UR-MCNC: 15 MG/DL
LEUKOCYTE ESTERASE UR-ACNC: NEGATIVE — SIGNIFICANT CHANGE UP
LYMPHOCYTES # BLD AUTO: 18 % — SIGNIFICANT CHANGE UP (ref 13–44)
LYMPHOCYTES # BLD AUTO: 2.27 K/UL — SIGNIFICANT CHANGE UP (ref 1–3.3)
MAGNESIUM SERPL-MCNC: 2.1 MG/DL — SIGNIFICANT CHANGE UP (ref 1.6–2.6)
MCHC RBC-ENTMCNC: 29.3 PG — SIGNIFICANT CHANGE UP (ref 27–34)
MCHC RBC-ENTMCNC: 33.4 GM/DL — SIGNIFICANT CHANGE UP (ref 32–36)
MCV RBC AUTO: 87.7 FL — SIGNIFICANT CHANGE UP (ref 80–100)
MONOCYTES # BLD AUTO: 1.1 K/UL — HIGH (ref 0–0.9)
MONOCYTES NFR BLD AUTO: 8.7 % — SIGNIFICANT CHANGE UP (ref 2–14)
NEUTROPHILS # BLD AUTO: 9 K/UL — HIGH (ref 1.8–7.4)
NEUTROPHILS NFR BLD AUTO: 71.1 % — SIGNIFICANT CHANGE UP (ref 43–77)
NITRITE UR-MCNC: NEGATIVE — SIGNIFICANT CHANGE UP
NRBC # BLD: 0 /100 WBCS — SIGNIFICANT CHANGE UP (ref 0–0)
PCP SPEC-MCNC: SIGNIFICANT CHANGE UP
PH UR: 6 — SIGNIFICANT CHANGE UP (ref 5–8)
PLATELET # BLD AUTO: 420 K/UL — HIGH (ref 150–400)
POTASSIUM SERPL-MCNC: 3.7 MMOL/L — SIGNIFICANT CHANGE UP (ref 3.5–5.3)
POTASSIUM SERPL-MCNC: SIGNIFICANT CHANGE UP MMOL/L (ref 3.5–5.3)
POTASSIUM SERPL-SCNC: 3.7 MMOL/L — SIGNIFICANT CHANGE UP (ref 3.5–5.3)
POTASSIUM SERPL-SCNC: SIGNIFICANT CHANGE UP MMOL/L (ref 3.5–5.3)
PROT SERPL-MCNC: 6.4 G/DL — SIGNIFICANT CHANGE UP (ref 6–8.3)
PROT SERPL-MCNC: 8.1 G/DL — SIGNIFICANT CHANGE UP (ref 6–8.3)
PROT UR-MCNC: ABNORMAL MG/DL
RBC # BLD: 4.81 M/UL — SIGNIFICANT CHANGE UP (ref 4.2–5.8)
RBC # FLD: 13.4 % — SIGNIFICANT CHANGE UP (ref 10.3–14.5)
SODIUM SERPL-SCNC: 138 MMOL/L — SIGNIFICANT CHANGE UP (ref 135–145)
SODIUM SERPL-SCNC: 143 MMOL/L — SIGNIFICANT CHANGE UP (ref 135–145)
SP GR SPEC: 1.02 — SIGNIFICANT CHANGE UP (ref 1–1.03)
UROBILINOGEN FLD QL: 0.2 E.U./DL — SIGNIFICANT CHANGE UP
WBC # BLD: 12.64 K/UL — HIGH (ref 3.8–10.5)
WBC # FLD AUTO: 12.64 K/UL — HIGH (ref 3.8–10.5)

## 2019-03-30 PROCEDURE — 36415 COLL VENOUS BLD VENIPUNCTURE: CPT

## 2019-03-30 PROCEDURE — 99284 EMERGENCY DEPT VISIT MOD MDM: CPT | Mod: 25

## 2019-03-30 PROCEDURE — 96374 THER/PROPH/DIAG INJ IV PUSH: CPT

## 2019-03-30 PROCEDURE — 80053 COMPREHEN METABOLIC PANEL: CPT

## 2019-03-30 PROCEDURE — 81001 URINALYSIS AUTO W/SCOPE: CPT

## 2019-03-30 PROCEDURE — 96376 TX/PRO/DX INJ SAME DRUG ADON: CPT

## 2019-03-30 PROCEDURE — 85025 COMPLETE CBC W/AUTO DIFF WBC: CPT

## 2019-03-30 PROCEDURE — 83735 ASSAY OF MAGNESIUM: CPT

## 2019-03-30 PROCEDURE — 80307 DRUG TEST PRSMV CHEM ANLYZR: CPT

## 2019-03-30 PROCEDURE — 96361 HYDRATE IV INFUSION ADD-ON: CPT

## 2019-03-30 RX ORDER — SODIUM CHLORIDE 9 MG/ML
1000 INJECTION INTRAMUSCULAR; INTRAVENOUS; SUBCUTANEOUS ONCE
Qty: 0 | Refills: 0 | Status: COMPLETED | OUTPATIENT
Start: 2019-03-30 | End: 2019-03-30

## 2019-03-30 RX ADMIN — SODIUM CHLORIDE 1000 MILLILITER(S): 9 INJECTION INTRAMUSCULAR; INTRAVENOUS; SUBCUTANEOUS at 04:31

## 2019-03-30 RX ADMIN — Medication 1 MILLIGRAM(S): at 04:05

## 2019-03-30 RX ADMIN — Medication 1 MILLIGRAM(S): at 03:33

## 2019-03-30 RX ADMIN — SODIUM CHLORIDE 1000 MILLILITER(S): 9 INJECTION INTRAMUSCULAR; INTRAVENOUS; SUBCUTANEOUS at 03:28

## 2019-03-30 NOTE — ED ADULT TRIAGE NOTE - CHIEF COMPLAINT QUOTE
pt BIBA per EMS from [address] reportedly snorted unknown substance now complaining of palpitations denies ETOH use reports heroin and methadone use tonight pt A&Ox3

## 2019-03-30 NOTE — ED PROVIDER NOTE - OBJECTIVE STATEMENT
33M hx htn, bipolar, polysubstance abuse, c/o feeling unwell tonight. pt states he took barbiturates, adderall, flexeril and suboxone tonight.  +palpitations.  no chest pain. no SOB. no vomiting. pt feeling anxious and shaky.  no HA.  no SI/HI.

## 2019-03-30 NOTE — ED ADULT TRIAGE NOTE - TEMPERATURE IN FAHRENHEIT (DEGREES F)
Patient calls today stating she has significant swelling to feet and ankles and wanted to know if she should come here or the ER.   I had patient check for pitting edema, which was negative.   + for headaches, but that was addressed by Dr Rowell on last visit with magnesium and Tylenol  Blood pressure at last visit was 102/74.  Advised patient to do a recheck of BP and let me know if not WNL- continue with her magnesium/tylenol, Stay off her feet and elevate them to above level of heart. Also advised to over hydrate and to avoid sodium as much as possible.   Patient also states baby is not as active as usual.  Explained that at 22 weeks, she may not always be able to detect fetal movement, but when resting to eat something with a high sugary content and some juice to see if she is able to detect movement.     98.6

## 2019-03-30 NOTE — ED ADULT NURSE NOTE - CAS TRG GEN SKIN COLOR
Normal for race Patient is a 79y old  Male who presents with a chief complaint of L2-S1 Laminectomy and Fusion (29 Mar 2018 13:31)      INTERVAL HPI/OVERNIGHT EVENTS:  NONE. Patient s/p laminectomy today.  He reports that he is still very tired, but that his pain is mostly controlled, except when he moves his legs.      T(C): 36.4 (03-29-18 @ 16:00), Max: 36.7 (03-29-18 @ 11:34)  HR: 83 (03-29-18 @ 17:00) (68 - 88)  BP: 126/62 (03-29-18 @ 17:00) (88/46 - 140/80)  RR: 10 (03-29-18 @ 17:00) (10 - 21)  SpO2: 96% (03-29-18 @ 17:00) (93% - 100%)  I&O's Summary    29 Mar 2018 07:01  -  29 Mar 2018 17:50  --------------------------------------------------------  IN: 375 mL / OUT: 305 mL / NET: 70 mL        PAST MEDICAL & SURGICAL HISTORY:  Skin cancer  Sciatica  HTN (hypertension)  Numbness  Back pain  Arthritis  [place] (hard of hearing): right hearing aid  Spinal stenosis in cervical region  BPH (benign prostatic hypertrophy)  Elective surgery: Mohs  neck 2018  Elective surgery: cataract extraction 2018  Elective surgery: cervical fusion 2013  H/O left knee surgery: 1994 - repair of torn meniscus  Inguinal hernia: h/o repair - right      SOCIAL HISTORY  Alcohol: yes, 1-2 beers a night  Tobacco: former smoker quit 1975  Illicit substance use: denies      FAMILY HISTORY:  no pertinent family history    Home Medications:  amLODIPine 2.5 mg oral tablet: 1 tab(s) orally once a day (29 Mar 2018 06:36)  finasteride 5 mg oral tablet: 1 tab(s) orally once a day (29 Mar 2018 06:36)  METOPROLOL SUCC ER 25 MG TAB:  (29 Mar 2018 06:36)  tamsulosin 0.4 mg oral capsule: 1 cap(s) orally once a day (29 Mar 2018 06:36)        LABS:                        13.6   x     )-----------( x        ( 29 Mar 2018 17:24 )             39.8     03-29    141  |  110<H>  |  21  ----------------------------<  153<H>  4.2   |  23  |  1.30    Ca    7.9<L>      29 Mar 2018 17:24          CAPILLARY BLOOD GLUCOSE                MEDICATIONS  (STANDING):  amLODIPine   Tablet 2.5 milliGRAM(s) Oral daily  calcium carbonate 1250 mG + Vitamin D (OsCal 500 + D) 1 Tablet(s) Oral three times a day  ceFAZolin   IVPB 2000 milliGRAM(s) IV Intermittent every 8 hours  ceFAZolin   IVPB 2000 milliGRAM(s) IV Intermittent every 8 hours  dexamethasone  IVPB 10 milliGRAM(s) IV Intermittent every 8 hours  docusate sodium 100 milliGRAM(s) Oral three times a day  finasteride 5 milliGRAM(s) Oral daily  folic acid 1 milliGRAM(s) Oral daily  lactated ringers. 1000 milliLiter(s) (75 mL/Hr) IV Continuous <Continuous>  metoprolol succinate ER 25 milliGRAM(s) Oral daily  multivitamin 1 Tablet(s) Oral daily  tamsulosin 0.4 milliGRAM(s) Oral daily  thiamine 100 milliGRAM(s) Oral daily    MEDICATIONS  (PRN):  acetaminophen   Tablet 650 milliGRAM(s) Oral every 6 hours PRN For Temp greater than 38 C (100.4 F)  acetaminophen   Tablet. 650 milliGRAM(s) Oral every 6 hours PRN Headache  aluminum hydroxide/magnesium hydroxide/simethicone Suspension 30 milliLiter(s) Oral every 12 hours PRN Indigestion  benzocaine 15 mG/menthol 3.6 mG Lozenge 1 Lozenge Oral every 2 hours PRN Sore Throat  diazepam    Tablet 5 milliGRAM(s) Oral every 8 hours PRN Muscle Spasms  famotidine    Tablet 20 milliGRAM(s) Oral every 24 hours PRN Dyspepsia  HYDROmorphone  Injectable 1 milliGRAM(s) IV Push every 4 hours PRN Severe Pain (7 - 10)  magnesium hydroxide Suspension 30 milliLiter(s) Oral every 12 hours PRN Constipation  ondansetron Injectable 4 milliGRAM(s) IV Push every 6 hours PRN Nausea  oxyCODONE    IR 5 milliGRAM(s) Oral every 4 hours PRN Mild Pain (1 - 3)  oxyCODONE    IR 10 milliGRAM(s) Oral every 4 hours PRN Moderate Pain (4 - 6)      REVIEW OF SYSTEMS:  CONSTITUTIONAL: No fever, weight loss, or fatigue  EYES: No eye pain, visual disturbances, or discharge  ENMT:  No difficulty hearing, tinnitus, vertigo; No sinus or throat pain  NECK: No pain or stiffness  RESPIRATORY: No cough, wheezing, chills or hemoptysis; No shortness of breath  CARDIOVASCULAR: No chest pain, palpitations, dizziness, or leg swelling  GASTROINTESTINAL: No abdominal or epigastric pain. No nausea, vomiting, or hematemesis; No diarrhea or constipation. No melena or hematochezia.  GENITOURINARY: No dysuria, frequency, hematuria, or incontinence  NEUROLOGICAL: No headaches, memory loss, loss of strength, numbness, or tremors  SKIN: No itching, burning, rashes, or lesions   LYMPH NODES: No enlarged glands  ENDOCRINE: No heat or cold intolerance; No hair loss  MUSCULOSKELETAL: Back pain worse w/ movement; pain mostly controlled.  PSYCHIATRIC: No depression, anxiety, mood swings, or difficulty sleeping  HEME/LYMPH: No easy bruising, or bleeding gums  ALLERY AND IMMUNOLOGIC: No hives or eczema    RADIOLOGY & ADDITIONAL TESTS:    Imaging Personally Reviewed:  [x] YES  [ ] NO    Consultant(s) Notes Reviewed:  [x] YES  [ ] NO    PHYSICAL EXAM:  GENERAL: NAD, well-groomed, well-developed, sedated s/p anethesia  HEAD:  Atraumatic, Normocephalic  EYES: EOMI, PERRLA, conjunctiva and sclera clear  ENMT: No tonsillar erythema, exudates, or enlargement; Moist mucous membranes, Good dentition, No lesions  NECK: Supple, No JVD, Normal thyroid  NERVOUS SYSTEM:  Alert & Oriented X3, Good concentration; no focal deficits  CHEST/LUNG: Clear to percussion bilaterally; No rales, rhonchi, wheezing, or rubs  HEART: Regular rate and rhythm; No murmurs, rubs, or gallops  ABDOMEN: Soft, Nontender, Nondistended; Bowel sounds present  EXTREMITIES:  2+ Peripheral Pulses, No clubbing, cyanosis, or edema  LYMPH: No lymphadenopathy noted  SKIN: No rashes or lesions    Care Discussed with Consultants/Other Providers [x] YES  [ ] NO

## 2019-03-30 NOTE — ED PROVIDER NOTE - NSFOLLOWUPINSTRUCTIONS_ED_ALL_ED_FT
Polysubstance Abuse    WHAT YOU NEED TO KNOW:    Polysubstance abuse is the abuse of 2 or more drugs that cause impairment or distress. Examples include alcohol, nicotine, marijuana, cocaine, heroin, methamphetamine, hallucinogens such as mushrooms, or inhalants such as paint thinner. Prescribed medicines, such as opioids for pain or benzodiazepines for anxiety, are also commonly abused.    DISCHARGE INSTRUCTIONS:    Call 911 for any of the following:     You feel you might harm yourself or others.         Return to the emergency department if:     You have a seizure.       You have chest pain and your heart is beating faster than usual.       You have new shortness of breath.       You are dizzy and lightheaded.     Contact your healthcare provider or therapist if:     You are using drugs and think you are pregnant.       You have withdrawal symptoms and want to start using drugs again.       You have questions or concerns about your condition or care.     Risks of polysubstance abuse:     Drug dependence is when you continue to use drugs, even when you know the risks. Polysubstance abuse can damage your heart, brain, lungs, liver, and gastrointestinal tract. You continue even when it causes problems with work, school, or relationships. You may have difficulty finding or keeping a job because of your drug dependence.       Drug tolerance is when you need to use more drugs, or use them more often, to get the effects you want. You may not be able to stop using the drugs. When you try to stop, you may have withdrawal symptoms and strong cravings for the drugs.      Drug overdose can occur when you take more drugs than your body can handle. This may be a small amount or a large amount. You can lose consciousness or have a seizure or stroke. Your heart can stop beating, or you can stop breathing. You may die from a drug overdose.     Medicines:     Withdrawal medicines may be given according to the types of drugs you are abusing. Withdrawal from drugs can cause serious, life-threatening side effects. Certain medicines can help decrease your withdrawal symptoms and your desire for the drug. Ask for more information about the withdrawal medicines you may need.       Mood stabilizers may be given to help prevent or treat depression or anxiety caused by drug abuse and withdrawal.       Take your medicine as directed. Contact your healthcare provider if you think your medicine is not helping or if you have side effects. Tell him or her if you are allergic to any medicine. Keep a list of the medicines, vitamins, and herbs you take. Include the amounts, and when and why you take them. Bring the list or the pill bottles to follow-up visits. Carry your medicine list with you in case of an emergency.    Follow up with your healthcare provider as directed: You may be referred to a specialist to treat health conditions caused by your drug use. This includes mental health, heart, or lung specialists. Write down your questions so you remember to ask them during your visits.     Therapy: You may need therapy and support to stop using drugs:     Cognitive and behavioral therapy helps you change your thinking and behavior. It can help you develop plans to avoid the situations that make you want to use drugs. It also helps you cope with the feelings of wanting to use drugs. You may have individual or group therapy.       Contingency management helps you set drug-free goals with a therapist. You will decide ways to celebrate your success when you reach a goal.       Family therapy and support groups allow you and your family members to talk to and be encouraged by other people affected by drug abuse. You and your family members may attend together or separately. Ask your healthcare provider for information about programs in your area.     How polysubstance abuse affects unborn or  babies:     If you are pregnant or get pregnant while using drugs, you may have a miscarriage or give birth early. Your baby may be born addicted to the drugs.      Do not breastfeed your baby if you use drugs. Drugs pass from your bloodstream into your breast milk and affect your baby's health. Talk with your healthcare provider if you are using drugs and breastfeeding.    For support and more information:     Alcoholics Anonymous  Web Address: http://www.aa.org      National Clearinghouse on Drug and Alcohol Information  Phone: 7-014-4484453  Web Address: www.health.org      National [place] on Alcoholism and Drug Dependence  [address] [place], [place] [id number]  Phone: 1-484.651.2583  Phone: 1-288.139.4386  Web Address: http://www.ncadd.org

## 2019-03-30 NOTE — ED PROVIDER NOTE - PROGRESS NOTE DETAILS
HR 87, alerted to dangers of illicit drug use. pt states going to program on monday  I have discussed the discharge plan with the patient. The patient agrees with the plan, as discussed.  The patient understands Emergency Department diagnosis is a preliminary diagnosis often based on limited information and that the patient must adhere to the follow-up plan as discussed.  The patient understands that if the symptoms worsen  the patient may return to the Emergency Department at any time for further evaluation and treatment.

## 2019-03-30 NOTE — ED ADULT NURSE NOTE - OBJECTIVE STATEMENT
pt states he has been using drugs (suboxone, flexeril, heroin) all day as per his usual and then he snorted a drug he did not know and now he feels anxious and thinks he should be checked.  admits to having intermittent palps earlier.  speech rapid.  +psychomotor agitation.  oriented x3 and answers all questions appropriately.

## 2019-04-07 ENCOUNTER — EMERGENCY (EMERGENCY)
Facility: HOSPITAL | Age: 34
LOS: 1 days | Discharge: HOME | End: 2019-04-07
Admitting: EMERGENCY MEDICINE
Payer: MEDICAID

## 2019-04-07 VITALS
HEART RATE: 94 BPM | OXYGEN SATURATION: 98 % | RESPIRATION RATE: 16 BRPM | TEMPERATURE: 98 F | SYSTOLIC BLOOD PRESSURE: 133 MMHG | DIASTOLIC BLOOD PRESSURE: 76 MMHG

## 2019-04-07 VITALS
HEART RATE: 102 BPM | RESPIRATION RATE: 18 BRPM | SYSTOLIC BLOOD PRESSURE: 128 MMHG | OXYGEN SATURATION: 96 % | DIASTOLIC BLOOD PRESSURE: 79 MMHG | TEMPERATURE: 98 F

## 2019-04-07 PROBLEM — F19.10 OTHER PSYCHOACTIVE SUBSTANCE ABUSE, UNCOMPLICATED: Chronic | Status: ACTIVE | Noted: 2019-03-30

## 2019-04-07 PROBLEM — F31.9 BIPOLAR DISORDER, UNSPECIFIED: Chronic | Status: ACTIVE | Noted: 2019-03-30

## 2019-04-07 LAB — GLUCOSE BLDC GLUCOMTR-MCNC: 105 MG/DL — HIGH (ref 70–99)

## 2019-04-07 PROCEDURE — 99283 EMERGENCY DEPT VISIT LOW MDM: CPT | Mod: 25

## 2019-04-07 NOTE — ED ADULT NURSE REASSESSMENT NOTE - NS ED NURSE REASSESS COMMENT FT1
Pt received from JANET Costello. Pt is sleeping in bed with no s.s of acute distress. Safety maintained. Will continue to monitor.

## 2019-04-07 NOTE — ED PROVIDER NOTE - CLINICAL SUMMARY MEDICAL DECISION MAKING FREE TEXT BOX
Patient BIB EMS for AMS admits to drinking and taking K2, no sign of trauma. will continue to observe until clinical sobriety

## 2019-04-07 NOTE — ED ADULT TRIAGE NOTE - CHIEF COMPLAINT QUOTE
patient found on [address]. steady gait. patient awake to verbal stimuli,. states he drank and did "speed", patient told EMS he did K2.

## 2019-04-07 NOTE — ED PROVIDER NOTE - PMH
ADD (attention deficit disorder)    Bipolar disorder    Bipolar disorder    Chronic drug abuse    HTN (hypertension)    HTN (hypertension)    Polysubstance abuse    Seizures

## 2019-04-07 NOTE — ED PROVIDER NOTE - OBJECTIVE STATEMENT
PMHX Polysubstance abuse, homeless BIB EMS for AMS states that he took K2 and 10 drinks. denies fall.

## 2019-04-11 ENCOUNTER — EMERGENCY (EMERGENCY)
Facility: HOSPITAL | Age: 34
LOS: 1 days | Discharge: HOME | End: 2019-04-11
Attending: EMERGENCY MEDICINE | Admitting: EMERGENCY MEDICINE
Payer: MEDICAID

## 2019-04-11 VITALS
HEART RATE: 98 BPM | DIASTOLIC BLOOD PRESSURE: 72 MMHG | RESPIRATION RATE: 20 BRPM | OXYGEN SATURATION: 98 % | SYSTOLIC BLOOD PRESSURE: 123 MMHG

## 2019-04-11 VITALS
RESPIRATION RATE: 18 BRPM | SYSTOLIC BLOOD PRESSURE: 149 MMHG | TEMPERATURE: 98 F | DIASTOLIC BLOOD PRESSURE: 86 MMHG | HEIGHT: 63 IN | WEIGHT: 169.98 LBS | HEART RATE: 112 BPM | OXYGEN SATURATION: 97 %

## 2019-04-11 DIAGNOSIS — I10 ESSENTIAL (PRIMARY) HYPERTENSION: ICD-10-CM

## 2019-04-11 DIAGNOSIS — F41.9 ANXIETY DISORDER, UNSPECIFIED: ICD-10-CM

## 2019-04-11 DIAGNOSIS — F19.10 OTHER PSYCHOACTIVE SUBSTANCE ABUSE, UNCOMPLICATED: ICD-10-CM

## 2019-04-11 DIAGNOSIS — F12.10 CANNABIS ABUSE, UNCOMPLICATED: ICD-10-CM

## 2019-04-11 DIAGNOSIS — R41.82 ALTERED MENTAL STATUS, UNSPECIFIED: ICD-10-CM

## 2019-04-11 DIAGNOSIS — F10.10 ALCOHOL ABUSE, UNCOMPLICATED: ICD-10-CM

## 2019-04-11 LAB — PCP SPEC-MCNC: SIGNIFICANT CHANGE UP

## 2019-04-11 PROCEDURE — 99283 EMERGENCY DEPT VISIT LOW MDM: CPT

## 2019-04-11 PROCEDURE — 80307 DRUG TEST PRSMV CHEM ANLYZR: CPT

## 2019-04-11 PROCEDURE — 99284 EMERGENCY DEPT VISIT MOD MDM: CPT | Mod: 25

## 2019-04-11 RX ORDER — GABAPENTIN 400 MG/1
300 CAPSULE ORAL ONCE
Qty: 0 | Refills: 0 | Status: COMPLETED | OUTPATIENT
Start: 2019-04-11 | End: 2019-04-11

## 2019-04-11 RX ORDER — ALPRAZOLAM 0.25 MG
1 TABLET ORAL ONCE
Qty: 0 | Refills: 0 | Status: DISCONTINUED | OUTPATIENT
Start: 2019-04-11 | End: 2019-04-11

## 2019-04-11 RX ORDER — BUPROPION HYDROCHLORIDE 150 MG/1
300 TABLET, EXTENDED RELEASE ORAL ONCE
Qty: 0 | Refills: 0 | Status: COMPLETED | OUTPATIENT
Start: 2019-04-11 | End: 2019-04-11

## 2019-04-11 RX ADMIN — BUPROPION HYDROCHLORIDE 300 MILLIGRAM(S): 150 TABLET, EXTENDED RELEASE ORAL at 05:48

## 2019-04-11 RX ADMIN — Medication 1 MILLIGRAM(S): at 05:33

## 2019-04-11 RX ADMIN — GABAPENTIN 300 MILLIGRAM(S): 400 CAPSULE ORAL at 05:33

## 2019-04-11 NOTE — ED PROVIDER NOTE - NSFOLLOWUPINSTRUCTIONS_ED_ALL_ED_FT
Polysubstance Abuse    AMBULATORY CARE:    Polysubstance abuse is the abuse of 2 or more drugs that cause impairment or distress. Substances include alcohol, nicotine, marijuana, cocaine, heroin, methamphetamine, hallucinogens such as mushrooms, or inhalants such as paint thinner. Prescribed medicines, such as opioids for pain or benzodiazepines for anxiety, are also commonly abused.    Call 911 for any of the following:     You feel you might harm yourself or others.         Seek care immediately if:     You have a seizure.       You have chest pain and your heart is beating faster than usual.       You have new shortness of breath.       You are dizzy and lightheaded.     Contact your healthcare provider or therapist if:     You are using drugs and think you are pregnant.       You have withdrawal symptoms and want to start using drugs again.       You have questions or concerns about your condition or care.     Medicines:     Withdrawal medicines may be given according to the types of drugs you are abusing. Withdrawal from drugs can cause serious, life-threatening side effects. Certain medicines can help decrease your withdrawal symptoms and your desire for the drug. Ask for more information about the withdrawal medicines you may need.       Mood stabilizers may be given to help prevent or treat depression or anxiety caused by drug abuse and withdrawal.       Take your medicine as directed. Contact your healthcare provider if you think your medicine is not helping or if you have side effects. Tell him or her if you are allergic to any medicine. Keep a list of the medicines, vitamins, and herbs you take. Include the amounts, and when and why you take them. Bring the list or the pill bottles to follow-up visits. Carry your medicine list with you in case of an emergency.    Therapy and support to help you stop using drugs:     Cognitive and behavioral therapy helps you change your thinking and behavior. It can help you develop plans to avoid the situations that make you want to use drugs. It also helps you cope with the feelings of wanting to use drugs. You may have individual or group therapy.       Contingency management helps you set drug-free goals with a therapist. You will decide ways to celebrate your success when you reach a goal.       Family therapy and support groups allow you and your family members to talk to and be encouraged by other people affected by drug abuse. You and your family members may attend together or separately. Ask your healthcare provider for information about programs in your area.     How polysubstance abuse affects unborn or  babies:     If you are pregnant or get pregnant while using drugs, you may have a miscarriage or give birth early. Your baby may be born addicted to the drugs.      Do not breastfeed your baby if you use drugs. Drugs pass from your bloodstream into your breast milk and affect your baby's health. Talk with your healthcare provider if you are using drugs and breastfeeding.    Follow up with your healthcare provider as directed: You may be referred to a specialist to treat health conditions caused by your drug use. This includes mental health, heart, or lung specialists. Write down your questions so you remember to ask them during your visits.

## 2019-04-11 NOTE — ED PROVIDER NOTE - CLINICAL SUMMARY MEDICAL DECISION MAKING FREE TEXT BOX
polysubstance abuse, feeling anxious and palpitations  utox sent  -ekg  -xanax  -wellbutrin, neurontin (pt states takes daily)

## 2019-04-11 NOTE — ED PROVIDER NOTE - OBJECTIVE STATEMENT
34M hx bipolar, polysubstance abuse, c/o withdrawal. pt admits to using klonipin, methadone and heroin.  states feeling nervous and shaky.  no vomiting. no dizziness. no diarrhea. no sweating.  no si/hi.

## 2019-04-11 NOTE — ED ADULT NURSE REASSESSMENT NOTE - NS ED NURSE REASSESS COMMENT FT1
Patient ordered for discharge to home. Patient not found on premises to sign discharge instructions and receive discharge paperwork.

## 2019-04-11 NOTE — ED ADULT NURSE NOTE - CHIEF COMPLAINT QUOTE
anxiety and panic attack this morning with slight chest discomfort, had seizures on and off for 2 days, had heroin and weeds yesterday morning.

## 2019-04-11 NOTE — ED ADULT NURSE NOTE - OBJECTIVE STATEMENT
Received a 34 year old male with reported anxiety. Patient reports that he was prescribed oral valium at home and ran out of his medication. Patient reports that he went to his physician to seek a refill and reports that the physician did not prescribe a refill. Patient admits to taking Methadone and Heroin. Patient reports that he also takes Abilify to sleep but was unable to sleep over the course of a few days. Patient brought in agitated but cooperative. Patient noted as tachycardic upon initial triage.

## 2019-04-12 ENCOUNTER — EMERGENCY (EMERGENCY)
Facility: HOSPITAL | Age: 34
LOS: 1 days | Discharge: HOME | End: 2019-04-12
Attending: EMERGENCY MEDICINE | Admitting: EMERGENCY MEDICINE
Payer: MEDICAID

## 2019-04-12 VITALS
HEART RATE: 120 BPM | RESPIRATION RATE: 20 BRPM | OXYGEN SATURATION: 99 % | SYSTOLIC BLOOD PRESSURE: 144 MMHG | TEMPERATURE: 99 F | DIASTOLIC BLOOD PRESSURE: 98 MMHG

## 2019-04-12 PROCEDURE — 99283 EMERGENCY DEPT VISIT LOW MDM: CPT

## 2019-04-12 PROCEDURE — 82962 GLUCOSE BLOOD TEST: CPT

## 2019-04-12 PROCEDURE — 99284 EMERGENCY DEPT VISIT MOD MDM: CPT | Mod: 25

## 2019-04-12 NOTE — ED ADULT NURSE NOTE - OBJECTIVE STATEMENT
Pt. states he came to ED after drinking "a pint of tim two hours ago" and "cocaine, heroin, xanax, K2." "I went to [facility]. Bg has no beds." Pt. is A&Ox2, calm, cooperative, able to answer questions. Pt. c/o 7/10 back and neck pain s/p mechanical fall backwards this AM, denies head injury, denies LOC. Pt. also reports visual hallucinations, states he sees "trees," denies auditory or tactile hallucinations. Pt. denies HA, N&V, anxiety. Mild tremors noted of b/l UE. Pt. reports he feels depressed from relying on drugs, denies any SI or HI at this time. Facial and chest erythema noted. EKG performed in ED.

## 2019-04-12 NOTE — ED ADULT TRIAGE NOTE - CHIEF COMPLAINT QUOTE
"I overdosed today. I did heroin, I did methadone, I drank vodka. I think I just need to be checked out" pt A/Ox3 in triage, speaking in full sentences

## 2019-04-12 NOTE — ED ADULT NURSE NOTE - NSIMPLEMENTINTERV_GEN_ALL_ED
General: Implemented All Universal Safety Interventions:  [place] to call system. Call bell, personal items and telephone within reach. Instruct patient to call for assistance. Room bathroom lighting operational. Non-slip footwear when patient is off stretcher. Physically safe environment: no spills, clutter or unnecessary equipment. Stretcher in lowest position, wheels locked, appropriate side rails in place.

## 2019-04-13 ENCOUNTER — EMERGENCY (EMERGENCY)
Facility: HOSPITAL | Age: 34
LOS: 1 days | Discharge: HOME | End: 2019-04-13
Admitting: EMERGENCY MEDICINE
Payer: MEDICAID

## 2019-04-13 VITALS
SYSTOLIC BLOOD PRESSURE: 109 MMHG | DIASTOLIC BLOOD PRESSURE: 84 MMHG | TEMPERATURE: 97 F | RESPIRATION RATE: 17 BRPM | HEART RATE: 90 BPM | OXYGEN SATURATION: 93 %

## 2019-04-13 VITALS
OXYGEN SATURATION: 99 % | HEART RATE: 106 BPM | DIASTOLIC BLOOD PRESSURE: 78 MMHG | SYSTOLIC BLOOD PRESSURE: 132 MMHG | RESPIRATION RATE: 18 BRPM

## 2019-04-13 PROCEDURE — 99284 EMERGENCY DEPT VISIT MOD MDM: CPT

## 2019-04-13 NOTE — ED PROVIDER NOTE - NSFOLLOWUPINSTRUCTIONS_ED_ALL_ED_FT
Substance Use Disorder  Substance use disorder happens when a person's repeated use of drugs or alcohol interferes with his or her ability to be productive. This disorder can cause problems with your mental and physical health. It can affect your ability to have healthy relationships, and it can keep you from being able to meet your responsibilities at work, home, or school. It can also lead to addiction.    The most commonly abused substances include:    Alcohol.  Tobacco.  Marijuana.  Stimulants, such as cocaine and methamphetamine.  Hallucinogens, such as LSD and PCP.  Opioids, such as some prescription pain medicines and heroin.    What are the causes?  This condition may develop due to social, psychological, or physical reasons. Causes include:    Stress.  Abuse.  Peer pressure.  Anxiety.  Depression.    What increases the risk?  This condition is more likely to develop in people who:    Are stressed.  Have been abused.  Have a mental health disorder, such as depression.  Are born with certain genes.    What are the signs or symptoms?  Symptoms of this condition include:    Using the substance for longer periods of time or at a higher dosage than what is normal or intended.  Having a lasting desire to use the substance.  Being unable to slow down or stop your use of the substance.  Spending an abnormal amount of time seeking the substance, using the substance, or recovering from using the substance.  Craving the substance.  Substance use that:    Interferes with your work, school, or home life.  Interferes with your personal and social relationships.  Makes you give up activities that you once enjoyed or found important.    Using the substance even though:    You know it is dangerous or bad for your health.  You know it is causing problems in your life.    Needing more and more of the substance to get the same effect (developing tolerance).  Experiencing physical symptoms if you do not use the substance (withdrawal).  Using the substance to avoid withdrawal.    How is this diagnosed?  This condition may be diagnosed based on:    Your history of substance use.  The way in which substance abuse affects your life.  Having at least two symptoms of substance use disorder within a 12-month period.    Your health care provider may also test your blood and urine for alcohol and drugs.    How is this treated?  This condition may be treated by:    Stopping substance use safely. This may require taking medicines and being closely observed for several days.  Taking part in group and individual counseling from mental health providers who help people with substance use disorder.  Staying at a residential treatment center for several days or weeks.  Attending daily counseling sessions at a treatment center.  Taking medicine as told by your health care provider:    To ease symptoms and prevent complications during withdrawal.  To treat other mental health issues, such as depression or anxiety.  To block cravings by causing the same effects as the substance.  To block the effects of the substance or replace good sensations with unpleasant ones.    Going to a support group to share your experience with others who are going through the same thing. These groups are an important part of long-term recovery for many people. They include 12-step programs like Alcoholics Anonymous and Narcotics Anonymous.    Recovery can be a long process. Many people who undergo treatment start using the substance again after stopping. This is called a relapse. If you have a relapse, that does not mean that treatment will not work.    Follow these instructions at home:  Take over-the-counter and prescription medicines only as told by your health care provider.  Do not use any drugs or alcohol.  Keep all follow-up visits as told by your health care provider. This is important. This includes continuing to work with therapists, health care providers, and support groups.  Contact a health care provider if:  You cannot take your medicines as told.  Your symptoms get worse.  You have trouble resisting the urge to use drugs or alcohol.  Get help right away if:  You have serious thoughts about hurting yourself or someone else.  You have a relapse.  This information is not intended to replace advice given to you by your health care provider. Make sure you discuss any questions you have with your health care provider.

## 2019-04-13 NOTE — ED PROVIDER NOTE - OBJECTIVE STATEMENT
34M hx polysubstance abuse, bipolar, c/o that he used heroin, methadone and alcohol tonight. states feeling anxious. no vomiting. no dizziness. no truama.  no SI/HI.

## 2019-04-13 NOTE — ED ADULT NURSE NOTE - CAS DISCH CONDITION
Pt requesting medication.      Name/type of medication:  Medication for nerve pain    New medication request:  Yes  Is this a Refill: No    Preferred  location if approved: [facility]    
Spoke to patient. He has finished the medrol dose pack which provided no relief for him. Patient saw [facility] C[facility] clinic on 2/9/18 for staple removal and she stated in her note that if pain persists after medrol dose pack then can try Neurontin to help with the nerve pain. Patient would like prescription for the neurontin. Rx was sent to his pharmacy. Advised patient contact clinic if any further questions or concerns. Patient verbalized understanding.   
Stable

## 2019-04-13 NOTE — ED ADULT NURSE NOTE - OBJECTIVE STATEMENT
Pt BIBA for c/o AMS - + AOB, has bottle of [place] vodka on his person- removed from patient's reach, admits to heroin and xanax use. + pinpoint pupils, maintaining airway, arousable to verbal stimulus

## 2019-04-13 NOTE — ED ADULT TRIAGE NOTE - CHIEF COMPLAINT QUOTE
BIBA for alcohol intoxication and substance use. Pt wakes up to verbal and painful stimuli. Reports drinking alcohol and use of xanax and heroine. No obvious injuries.

## 2019-04-13 NOTE — ED PROVIDER NOTE - OBJECTIVE STATEMENT
PMHX bipolar d/o, polysubstance abuse presents with AMS. BIB EMS after he was found walking with unsteady gait. admits to drinking and taking heroine tonight. denies fall, head trauma 18-Oct-2017

## 2019-04-13 NOTE — ED PROVIDER NOTE - CLINICAL SUMMARY MEDICAL DECISION MAKING FREE TEXT BOX
polysubstance abuse, feeling anxious. no SI/HI, multiple visits for similar in the past  no evidence of head trauma, no focal neuro deficits  pt had been given info regarding detox when discharged 4/11

## 2019-04-13 NOTE — ED ADULT NURSE NOTE - CHPI ED NUR SYMPTOMS NEG
no fever/no nausea/no pain/no chills/no decreased eating/drinking/no tingling/no dizziness/no vomiting/no weakness

## 2019-04-13 NOTE — ED PROVIDER NOTE - CLINICAL SUMMARY MEDICAL DECISION MAKING FREE TEXT BOX
patient BIB EMS for AMS chronic polysubstance abuse took alcohol and heroine tonight, well known to this ED for multiple ED visits for similar complaints. will continue to observe until clinical sobriety

## 2019-04-14 VITALS
RESPIRATION RATE: 16 BRPM | TEMPERATURE: 98 F | DIASTOLIC BLOOD PRESSURE: 56 MMHG | OXYGEN SATURATION: 97 % | HEART RATE: 97 BPM | SYSTOLIC BLOOD PRESSURE: 94 MMHG

## 2019-04-16 DIAGNOSIS — F10.10 ALCOHOL ABUSE, UNCOMPLICATED: ICD-10-CM

## 2019-04-16 DIAGNOSIS — R41.82 ALTERED MENTAL STATUS, UNSPECIFIED: ICD-10-CM

## 2019-04-16 DIAGNOSIS — I10 ESSENTIAL (PRIMARY) HYPERTENSION: ICD-10-CM

## 2019-04-16 DIAGNOSIS — F11.10 OPIOID ABUSE, UNCOMPLICATED: ICD-10-CM

## 2019-04-17 DIAGNOSIS — R41.82 ALTERED MENTAL STATUS, UNSPECIFIED: ICD-10-CM

## 2019-04-17 DIAGNOSIS — F19.129 OTHER PSYCHOACTIVE SUBSTANCE ABUSE WITH INTOXICATION, UNSPECIFIED: ICD-10-CM

## 2019-04-17 DIAGNOSIS — I10 ESSENTIAL (PRIMARY) HYPERTENSION: ICD-10-CM

## 2019-04-18 ENCOUNTER — EMERGENCY (EMERGENCY)
Facility: HOSPITAL | Age: 34
LOS: 1 days | Discharge: HOME | End: 2019-04-18
Attending: EMERGENCY MEDICINE | Admitting: EMERGENCY MEDICINE
Payer: MEDICAID

## 2019-04-18 VITALS
DIASTOLIC BLOOD PRESSURE: 75 MMHG | TEMPERATURE: 98 F | HEIGHT: 63 IN | RESPIRATION RATE: 18 BRPM | HEART RATE: 88 BPM | SYSTOLIC BLOOD PRESSURE: 122 MMHG | WEIGHT: 169.76 LBS | OXYGEN SATURATION: 98 %

## 2019-04-18 DIAGNOSIS — F11.10 OPIOID ABUSE, UNCOMPLICATED: ICD-10-CM

## 2019-04-18 DIAGNOSIS — F17.200 NICOTINE DEPENDENCE, UNSPECIFIED, UNCOMPLICATED: ICD-10-CM

## 2019-04-18 DIAGNOSIS — I10 ESSENTIAL (PRIMARY) HYPERTENSION: ICD-10-CM

## 2019-04-18 PROCEDURE — 99283 EMERGENCY DEPT VISIT LOW MDM: CPT

## 2019-04-18 PROCEDURE — 99284 EMERGENCY DEPT VISIT MOD MDM: CPT

## 2019-04-18 RX ORDER — LOPERAMIDE HCL 2 MG
2 TABLET ORAL ONCE
Qty: 0 | Refills: 0 | Status: COMPLETED | OUTPATIENT
Start: 2019-04-18 | End: 2019-04-18

## 2019-04-18 RX ADMIN — Medication 10 MILLIGRAM(S): at 13:22

## 2019-04-18 RX ADMIN — Medication 2 MILLIGRAM(S): at 13:22

## 2019-04-18 NOTE — ED PROVIDER NOTE - CONSTITUTIONAL, MLM
normal... + disheveled , Well appearing, well nourished, awake, alert, oriented to person, place, time/situation and in no apparent distress.

## 2019-04-18 NOTE — ED ADULT NURSE NOTE - NSIMPLEMENTINTERV_GEN_ALL_ED
Implemented All Universal Safety Interventions:  [place] to call system. Call bell, personal items and telephone within reach. Instruct patient to call for assistance. Room bathroom lighting operational. Non-slip footwear when patient is off stretcher. Physically safe environment: no spills, clutter or unnecessary equipment. Stretcher in lowest position, wheels locked, appropriate side rails in place.

## 2019-04-18 NOTE — ED PROVIDER NOTE - OBJECTIVE STATEMENT
34 m hx of HTN, BIpolar DO, polysubstance abuse- requesting detox- snorted heroin this am and smoked weed prior to coming- states he was in [place] yesterday for sz like activity  now having diarrhea- requesting med for detox, yesterday when withdrawing he felt chest tightness  no hx of mi  no hx of sz do  denies si/hi  moderate severity

## 2019-04-18 NOTE — ED PROVIDER NOTE - NSFOLLOWUPINSTRUCTIONS_ED_ALL_ED_FT
-PLEASE FOLLOW-UP WITH YOUR PRIMARY CARE DOCTOR IN 1-2 DAYS.  BRING ALL PAPERWORK FROM TODAY'S VISIT TO YOUR FOLLOW-UP VISIT.  IF YOU DO NOT HAVE A PRIMARY CARE DOCTOR PLEASE REFER TO THE OFFICE/CLINIC INFORMATION GIVEN ABOVE.  YOU MAY ALSO CALL [phone] AND ASK FOR MS. BROMICHEL LOPEZ.  SHE CAN HELP YOU MAKE A FOLLOW-UP APPOINTMENT.  HER HOURS ARE 11AM-7PM MONDAY - FRIDAY.  -TAKE OVER THE COUNTER TYLENOL 650MG BY MOUTH EVERY 4-6 HOURS AS NEEDED FOR PAIN.  DO NOT MIX WITH ALCOHOL OR OTHER PRESCRIPTION MEDICATIONS THAT ALREADY CONTAIN TYLENOL OR ACETAMINOPHEN.   -TAKE OVER THE COUNTER IBUPROFEN 400-600MG BY MOUTH EVERY 8 HOURS AS NEEDED FOR PAIN.  BE SURE TO TAKE WITH FOOD OR MILK AS THIS MEDICATION CAN CAUSE STOMACH IRRITATION.  -PLEASE RETURN TO THE ER IMMEDIATELY OR CALL 911 FOR ANY HIGH FEVER, TROUBLE BREATHING, VOMITING, SEVERE PAIN, OR ANY OTHER CONCERNS.

## 2019-04-18 NOTE — ED PROVIDER NOTE - CLINICAL SUMMARY MEDICAL DECISION MAKING FREE TEXT BOX
nonspecific withdrawal sx- will give narcan protection pack, meds for withdrawal  no hi/si- felt suicidal yesterday- was evaluated at outside hospital- not feeling si now- requesting meds  for w/d- agreeable to seeing psych as out

## 2019-04-18 NOTE — ED ADULT NURSE NOTE - NS PRO PASSIVE SMOKE EXP
6\" (1.676 m)   Physical Exam      Assessment:       No diagnosis found. Plan:     No Follow-up on file. No orders of the defined types were placed in this encounter. No orders of the defined types were placed in this encounter.       Diagnostic Tests performed in hospital: ***  Requested/reviewed: {Responses; yes/no/unknown/maybe/na:87240}    Disease Education:  {Diagnoses; chronic illnesses:49083}      Home Health Care :  {FOLLOW-UP DBOTELVF:16494}      Discussed with other providers: {Specialty physician d/c note md:02674}          Note:  CPT Coding - 41675 (Moderate level - seen within 14 days)      05367 (High level - seen within 7 days)  Needs to have associated phone contact within 2 business days of inpatient discharge    Electronically signed by Aris Randle on 3/6/2018 at 10:53 AM
Unknown

## 2019-04-18 NOTE — ED ADULT TRIAGE NOTE - CHIEF COMPLAINT QUOTE
pt states " I think I'm withdrawing, I has in the rehab for 2 days, then I went out and started using Heroin again, had 2 seizures so took a xanax this morning, heroine 6 am and marihuana to calm me down, I was at [facility] this morning but they didn't do anything" pt denies SI, visual or auditory hallucinations. ekg done

## 2019-04-18 NOTE — ED ADULT NURSE NOTE - CHIEF COMPLAINT QUOTE
pt states " I think I'm withdrawing, I has in the rehab for 2 days, then I went out and started using Heroin again, had 2 seizures so took a xanax this morning, heroine 6 am and marihuana to calm me down, I was at [facility] this morning but they didn't do anything" pt denies SI, visual or auditory hallucinations. ekg done

## 2019-04-18 NOTE — ED ADULT NURSE NOTE - OBJECTIVE STATEMENT
pt has hx of heroine & marijuana use. pt states having seizure like activity & brought to [facility], monitored & d/c home. pt was in a detox center for 2 days & checked himself out early 2 weeks. pt expressed wanting to seek detox again. pt presently c/o feeling like he may be going through withdrawal & having chest discomfort. pt admits to shooting up heroine at 6am and smoking marijuana 30 minutes PTA. pt ambulating with steady gait, speech clear & appropriate. denies SI, HI.

## 2019-04-28 ENCOUNTER — EMERGENCY (EMERGENCY)
Facility: HOSPITAL | Age: 34
LOS: 1 days | Discharge: HOME | End: 2019-04-28
Attending: EMERGENCY MEDICINE | Admitting: EMERGENCY MEDICINE
Payer: MEDICAID

## 2019-04-28 VITALS
RESPIRATION RATE: 18 BRPM | TEMPERATURE: 98 F | HEART RATE: 91 BPM | OXYGEN SATURATION: 98 % | SYSTOLIC BLOOD PRESSURE: 127 MMHG | DIASTOLIC BLOOD PRESSURE: 81 MMHG

## 2019-04-28 DIAGNOSIS — R45.851 SUICIDAL IDEATIONS: ICD-10-CM

## 2019-04-28 DIAGNOSIS — F31.4 BIPOLAR DISORDER, CURRENT EPISODE DEPRESSED, SEVERE, WITHOUT PSYCHOTIC FEATURES: ICD-10-CM

## 2019-04-28 DIAGNOSIS — I10 ESSENTIAL (PRIMARY) HYPERTENSION: ICD-10-CM

## 2019-04-28 DIAGNOSIS — F19.20 OTHER PSYCHOACTIVE SUBSTANCE DEPENDENCE, UNCOMPLICATED: ICD-10-CM

## 2019-04-28 LAB
ALBUMIN SERPL ELPH-MCNC: 3.8 G/DL — SIGNIFICANT CHANGE UP (ref 3.3–5)
ALP SERPL-CCNC: 46 U/L — SIGNIFICANT CHANGE UP (ref 40–120)
ALT FLD-CCNC: 33 U/L — SIGNIFICANT CHANGE UP (ref 10–45)
AMPHET UR-MCNC: NEGATIVE — SIGNIFICANT CHANGE UP
ANION GAP SERPL CALC-SCNC: 12 MMOL/L — SIGNIFICANT CHANGE UP (ref 5–17)
APAP SERPL-MCNC: <5 UG/ML — LOW (ref 10–30)
AST SERPL-CCNC: 19 U/L — SIGNIFICANT CHANGE UP (ref 10–40)
BARBITURATES UR SCN-MCNC: NEGATIVE — SIGNIFICANT CHANGE UP
BASOPHILS # BLD AUTO: 0.04 K/UL — SIGNIFICANT CHANGE UP (ref 0–0.2)
BASOPHILS NFR BLD AUTO: 0.4 % — SIGNIFICANT CHANGE UP (ref 0–2)
BENZODIAZ UR-MCNC: POSITIVE
BILIRUB SERPL-MCNC: 0.2 MG/DL — SIGNIFICANT CHANGE UP (ref 0.2–1.2)
BUN SERPL-MCNC: 10 MG/DL — SIGNIFICANT CHANGE UP (ref 7–23)
CALCIUM SERPL-MCNC: 9 MG/DL — SIGNIFICANT CHANGE UP (ref 8.4–10.5)
CHLORIDE SERPL-SCNC: 100 MMOL/L — SIGNIFICANT CHANGE UP (ref 96–108)
CO2 SERPL-SCNC: 27 MMOL/L — SIGNIFICANT CHANGE UP (ref 22–31)
COCAINE METAB.OTHER UR-MCNC: NEGATIVE — SIGNIFICANT CHANGE UP
CREAT SERPL-MCNC: 0.82 MG/DL — SIGNIFICANT CHANGE UP (ref 0.5–1.3)
EOSINOPHIL # BLD AUTO: 0.82 K/UL — HIGH (ref 0–0.5)
EOSINOPHIL NFR BLD AUTO: 7.6 % — HIGH (ref 0–6)
ETHANOL SERPL-MCNC: <10 MG/DL — SIGNIFICANT CHANGE UP (ref 0–10)
GLUCOSE SERPL-MCNC: 115 MG/DL — HIGH (ref 70–99)
HCT VFR BLD CALC: 40 % — SIGNIFICANT CHANGE UP (ref 39–50)
HGB BLD-MCNC: 12.7 G/DL — LOW (ref 13–17)
IMM GRANULOCYTES NFR BLD AUTO: 0.4 % — SIGNIFICANT CHANGE UP (ref 0–1.5)
LYMPHOCYTES # BLD AUTO: 2.39 K/UL — SIGNIFICANT CHANGE UP (ref 1–3.3)
LYMPHOCYTES # BLD AUTO: 22.2 % — SIGNIFICANT CHANGE UP (ref 13–44)
MCHC RBC-ENTMCNC: 29.1 PG — SIGNIFICANT CHANGE UP (ref 27–34)
MCHC RBC-ENTMCNC: 31.8 GM/DL — LOW (ref 32–36)
MCV RBC AUTO: 91.5 FL — SIGNIFICANT CHANGE UP (ref 80–100)
METHADONE UR-MCNC: POSITIVE
MONOCYTES # BLD AUTO: 0.76 K/UL — SIGNIFICANT CHANGE UP (ref 0–0.9)
MONOCYTES NFR BLD AUTO: 7.1 % — SIGNIFICANT CHANGE UP (ref 2–14)
NEUTROPHILS # BLD AUTO: 6.71 K/UL — SIGNIFICANT CHANGE UP (ref 1.8–7.4)
NEUTROPHILS NFR BLD AUTO: 62.3 % — SIGNIFICANT CHANGE UP (ref 43–77)
NRBC # BLD: 0 /100 WBCS — SIGNIFICANT CHANGE UP (ref 0–0)
OPIATES UR-MCNC: POSITIVE
PCP SPEC-MCNC: SIGNIFICANT CHANGE UP
PCP UR-MCNC: NEGATIVE — SIGNIFICANT CHANGE UP
PLATELET # BLD AUTO: 361 K/UL — SIGNIFICANT CHANGE UP (ref 150–400)
POTASSIUM SERPL-MCNC: 4.1 MMOL/L — SIGNIFICANT CHANGE UP (ref 3.5–5.3)
POTASSIUM SERPL-SCNC: 4.1 MMOL/L — SIGNIFICANT CHANGE UP (ref 3.5–5.3)
PROT SERPL-MCNC: 6.6 G/DL — SIGNIFICANT CHANGE UP (ref 6–8.3)
RBC # BLD: 4.37 M/UL — SIGNIFICANT CHANGE UP (ref 4.2–5.8)
RBC # FLD: 14.2 % — SIGNIFICANT CHANGE UP (ref 10.3–14.5)
SALICYLATES SERPL-MCNC: <0.3 MG/DL — LOW (ref 2.8–20)
SODIUM SERPL-SCNC: 139 MMOL/L — SIGNIFICANT CHANGE UP (ref 135–145)
THC UR QL: NEGATIVE — SIGNIFICANT CHANGE UP
WBC # BLD: 10.76 K/UL — HIGH (ref 3.8–10.5)
WBC # FLD AUTO: 10.76 K/UL — HIGH (ref 3.8–10.5)

## 2019-04-28 PROCEDURE — 93010 ELECTROCARDIOGRAM REPORT: CPT

## 2019-04-28 PROCEDURE — 99285 EMERGENCY DEPT VISIT HI MDM: CPT | Mod: 25

## 2019-04-28 RX ORDER — ARIPIPRAZOLE 15 MG/1
10 TABLET ORAL ONCE
Qty: 0 | Refills: 0 | Status: COMPLETED | OUTPATIENT
Start: 2019-04-28 | End: 2019-04-28

## 2019-04-28 RX ADMIN — ARIPIPRAZOLE 10 MILLIGRAM(S): 15 TABLET ORAL at 22:54

## 2019-04-28 RX ADMIN — Medication 2 MILLIGRAM(S): at 17:05

## 2019-04-28 NOTE — ED PROVIDER NOTE - OBJECTIVE STATEMENT
35 yo M polysubstance abuser presenting with suicidal attempt after taking 400mg Librium last night around 10pm, 22mg of Xanax this AM at 8am, as well as 2 bags of heroin (inhaled) at 11am today.  Pt reports he intentionally used too many drugs in order to stop living.  Has persistent SI.  Denies HI.  Pt reports stress at work and not having Abilify for 2 days for his depression as cause.  Pt drinks occasionally and no history of ETOH withdrawal.  Uses methadone illicitly as well, last intake 2 days ago.  Has been admitted for suicide attempt in past, last was at [facility] in Oct 2018

## 2019-04-28 NOTE — ED PROVIDER NOTE - PROGRESS NOTE DETAILS
Pt seen by psych to be admitted to outside hospital as no beds at [facility] or [facility].  SW and CM on case.  Psych following. Director - pt received from Dr Kaye pending psych bed; pt will be signed out to am team Director - pt received from Dr Kaye pending psych bed; pt medically clear and will be signed out to am team Pt signed out to Dr Braga and BRENDAN Butler pending psych bed assignment.

## 2019-04-28 NOTE — ED PROVIDER NOTE - PSYCHIATRIC, MLM
Alert and oriented to person, place, time/situation. Depressed affect at times, SI+, no tremor or tongue fasciculations.

## 2019-04-28 NOTE — ED BEHAVIORAL HEALTH ASSESSMENT NOTE - DESCRIPTION (FIRST USE, LAST USE, QUANTITY, FREQUENCY, DURATION)
see HPI chart notes pattern of heavy use. patient unable to provide any report to this MD. Per chart hx of use per chart hx of heroin insufflation, methadone abuse hx bzd abuse hx of K2 abuse, caffeine abuse (liters of coffee plus red bulls as cocaine substitute per chart) Heavy pattern of alcohol, heroin snorting, crack/cocaine use, recreational xanax, marijuana abuse since adolescence. Daily recreational Last week - crack/cocaine Street methadone and heroin insufflation over past 2 days (last bag of heroin ~8am today) Took ~10 x 2 mg "Xanax bars" over past 2 days in addition to ~400mg Librium over this time (last at 11pm last night)

## 2019-04-28 NOTE — ED BEHAVIORAL HEALTH ASSESSMENT NOTE - OTHER
none. patient somnolent. tele patient somnolent. none reported to MD. per report stated depressed to EM. patient somnolent. sleeping reported AVH to EM by report. no report to this MD unable to comment on intelligence of sleeping patient peers oddly-related, bizarre Mild tremor see HPI for AVH Awaiting inpatient psychiatric bed availability

## 2019-04-28 NOTE — ED BEHAVIORAL HEALTH ASSESSMENT NOTE - SUICIDE RISK FACTORS
Substance abuse/dependence/Global insomnia/Mood episode/Access to means (pills, firearms, etc.)/Agitation/severe anxiety

## 2019-04-28 NOTE — ED BEHAVIORAL HEALTH ASSESSMENT NOTE - SUMMARY
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 Patient presented today for suicidal ideation and anxiety after stopping ETOH and stopping psych meds.    He is not able to participate in full psych eval.     Per EM patient to be admitted to medicine for ETOH withdrawal.     Given suicidal ideation and AVH reported to EM but patient cannot be fully evaluated and no collateral is available, patient cannot be psychiatrically cleared at this time. he should remain on 1:1 for reported suicidal ideation, and C/L psychiatry consultation should be placed on medical floor.     Would not restart zyprexa at this moment as patient is sedated, and reported AVH/SI cannot be assessed at this time. They can be assumed to be primarily due to ETOH withdrawal at this time and should be managed as such. Patient is a 35yo Serbian M, single, non-caregiver, domiciled at [facility], unemployed, with reported PPhx of bipolar disorder, ADHD, ETOH abuse (Hx w/d symptoms including seizures), cocaine abuse, opiate abuse, K2 abuse, 20+ prior inpt psych admission most recently [facility] in November 2018 w/ suicidal ideation,  recent hx of active use of multiple substances, and PMhx of HTN. Patient presented today for suicidal ideation and anxiety after stopping psych meds for 2 weeks and relapsing on methadone, heroin, librium and xanax.    Given suicidal ideation and AVH reported patient warrants inpatient psychiatric hospitalization and should remain on 1:1 for reported suicidal ideation.     Would consider giving benzodiazepines sparingly as needed to prevent withdrawal seizures while monitoring vital signs. Would restart Gabapentin, Aripiprazole and Trazodone and use Quetiapine as a PRN for severe behavioral agitation. Would confirm Baclofen and quetiapine doses with any collateral patient is willing to provide. AVH and SI are presumably symptoms of a major depressive episode, in the context of polysubstance use.

## 2019-04-28 NOTE — ED BEHAVIORAL HEALTH ASSESSMENT NOTE - CURRENT MEDICATION
per chart from Oct 2018: "zyprexa 10mg daily, lamictal 100mg BID, Strattera 50mg daily, and atarax 50mg daily. States he is supposed to take diltiazem for HTN but is not currently."    per EM patient reported he was supposed to be taking olanzapine, clonidine, gabapentin but stopped either 4 days or 4 weeks ago. Reportedly:  Abilify 10mg daily; Trazodone 100mg qhs; Gabapentin 100mg TID; Mirtazapine 15mg qhs; Quetiapine unknown dose; Baclofen unknown dose; PrEP HIV prophylaxis med daily because of a dirty needle stick.   Per chart from Oct 2018: "zyprexa 10mg daily, lamictal 100mg BID, Strattera 50mg daily, and atarax 50mg daily. States he is supposed to take diltiazem for HTN but is not currently."    per EM patient reported he was supposed to be taking olanzapine, clonidine, gabapentin but stopped either 4 days or 4 weeks ago.

## 2019-04-28 NOTE — ED BEHAVIORAL HEALTH ASSESSMENT NOTE - MEDICAL ISSUES AND PLAN (INCLUDE STANDING AND PRN MEDICATION)
Would clarify patient's use of PrEP for HIV prophylaxis in context of "dirty needle stick". Monitor Vital signs in context or multiple illicit respiratory depressants

## 2019-04-28 NOTE — ED ADULT NURSE NOTE - OBJECTIVE STATEMENT
Patient presents to the ED complaining of taking librium, xanax and heroin. Patient states that he always does heroin and decided to over do it today. Patient reports that he took 800mg of librium, 10mg xanax and 10 bags of heroin. Patient with a drowsy affect, able to answer questions, placed on the cardiac monitor. Labs sent. Patient placed on a 1:1.

## 2019-04-28 NOTE — ED BEHAVIORAL HEALTH ASSESSMENT NOTE - DESCRIPTION
per EM report patient was calm and conversational prior to consultation. he was tachycardic so bzds were administered. 1:1 reports that patient has just fallen asleep when MD calls in via tele. per chart from Oct 2018: history of seizures, HTN see HPI per EM report patient was calm and conversational prior to consultation. He reported suicidal ideation with intent to overdose and the need for psychotropic medications and the establishment of care. per chart from Oct 2018: history of seizures, HTN - not on meds for these

## 2019-04-28 NOTE — ED BEHAVIORAL HEALTH ASSESSMENT NOTE - PSYCHIATRIC ISSUES AND PLAN (INCLUDE STANDING AND PRN MEDICATION)
hold zyprexa for now as last dose date is unknown, sx report cannot be obtained at this time, and patient is already sedated, and AVH in setting of ETOH withdrawal should be managed as such (ie with CIWA protocol/BZDs) Would offer Aripiprazole 10mg daily; Trazodone 100mg qhs and consider Gabapentin

## 2019-04-28 NOTE — ED BEHAVIORAL HEALTH ASSESSMENT NOTE - RISK ASSESSMENT
Risk factors include current suicidal ideation, substance intoxication, and noncompliance with treatment. Further safety assessment required Risk factors include current suicidal ideation, substance intoxication, poor social structure and non-compliance with treatment.   Further safety assessment required

## 2019-04-28 NOTE — ED BEHAVIORAL HEALTH ASSESSMENT NOTE - HPI (INCLUDE ILLNESS QUALITY, SEVERITY, DURATION, TIMING, CONTEXT, MODIFYING FACTORS, ASSOCIATED SIGNS AND SYMPTOMS)
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 Patient presented today for suicidal ideation and anxiety after stopping ETOH and stopping psych meds.    Patient had VS abnormalities on arrival, per ED high suspicion for ETOH withdrawal and was treated for same with BZDs prior to psych consult.     Patient visualized via telepsych. He presents as sedated, does not arouse to voice or to 1:1 staff attempting to rouse him.      Chart reviewed. Of note this assessment draws heavily on charted hx as patient does not provide any information to this MD.       Attempt made to contact pt's mother, Arnel Paige  at [phone]. VM  was left requesting call back.    Psyckes: Diagnoses: of stimulant use, opioid use, other psychostimulant use, cocaine use, alcohol use - along with unspecified depressive/bipolar/psychosis; multiple psych admissions along with ER visits without admissions. Patient is a 33yo French M, single, non-caregiver, domiciled at [facility], unemployed who is BIB self to [facility] ED after intentionally overdosing on benzodiazepines and opiates in a suicidal state in the context of medication non-compliance for his bipolar disorder.    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 Patient presented today for suicidal ideation and anxiety after stopping ETOH and stopping psych meds.    Patient had VS abnormalities on arrival, per ED high suspicion for ETOH withdrawal and was treated for same with BZDs prior to psych consult.     Patient visualized via telepsych. He presents as sedated, does not arouse to voice or to 1:1 staff attempting to rouse him.      Chart reviewed. Of note this assessment draws heavily on charted hx as patient does not provide any information to this MD.       Attempt made to contact pt's mother, Arnel Paige  at [phone]. VM  was left requesting call back.    Psyckes: Diagnoses: of stimulant use, opioid use, other psychostimulant use, cocaine use, alcohol use - along with unspecified depressive/bipolar/psychosis; multiple psych admissions along with ER visits without admissions. Patient is a 33yo Serbian M, single, non-caregiver, domiciled at [facility], unemployed who is BIB self to [facility] ED after intentionally overdosing on benzodiazepines and opiates in a suicidal state in the context of medication non-compliance for his bipolar disorder. He has an extensive psychiatric history of Bipolar I disorder, ADHD, ETOH abuse (Hx w/d symptoms including seizures), cocaine abuse, opiate abuse, K2 abuse, 20+ prior inpt psych admission most recently [facility] in November 2018 for suicidal ideation,  recent hx of active use of multiple substances, and PMhx of HTN.     Today, patient presented for suicidal ideation and anxiety after 2+ weeks of non-compliace with his psychotropic medications, which include Aripiprazole 10mg daily; Trazodone 100mg qhs; Mirtazapine 15mg qhs; Gabapentin 100mg TID; and an unknown quantity of Quetiapine and Baclofen, reportedly. He does not consent to speaking with personal collateral, but reports good relations with parents and sister. Mother: Arnel Do  at [phone]. He also gives sister's phone: [phone] (Angelika) but refuses consent to speak with her at this time.    He reports current symptoms of agitation, anxiety, suicidal ideation with intent to O.D. on illicit substances, depressed mood, feelings of guilt, insomnia, low self-worth and also reports having visual hallucinations - last of a horse that he thought was real until he passed his hand through the animal; and auditory hallucinations of music - last heard a [employer] song.    Upon evaluation, he exhibits mild tremor and tachycardia. He also reports mild stiffness in body and has slurred speech.    Per psyckes: Diagnoses: of stimulant use, opioid use, other psychostimulant use, cocaine use, alcohol use - along with unspecified depressive/bipolar/psychosis; multiple psych admissions along with ER visits without admissions.

## 2019-04-28 NOTE — ED BEHAVIORAL HEALTH ASSESSMENT NOTE - DIFFERENTIAL
bipolar disorder, ETOH use, K2 use,  MJ use, caffeine intoxication bipolar 1 disorder  Opiate use disorder  Sedative-hypnotic use disorder

## 2019-04-28 NOTE — ED BEHAVIORAL HEALTH ASSESSMENT NOTE - PAST PSYCHOTROPIC MEDICATION
risperdal, invega, vivitrol, wellbutrin, zoloft, paxil, celexa, haldol Risperdal, Paliperidone, Naltrexone, Bupropion, Sertraline, Paroxetine, Citalopram, Haloperidol, Lamotrigine, Atomoxetine, hydroxyzine, Olanzapine - per records

## 2019-04-28 NOTE — ED PROVIDER NOTE - CLINICAL SUMMARY MEDICAL DECISION MAKING FREE TEXT BOX
Pt with SI and attempt as noted.  HD stable, maintaining airway and breathing, no other medical issues.  Does not appear intoxicated or w/d.  Plan psych, labs, med clearance and reassess.

## 2019-04-28 NOTE — ED BEHAVIORAL HEALTH ASSESSMENT NOTE - SUBSTANCE ISSUES AND PLAN (INCLUDE STANDING AND PRN MEDICATION)
mgmt of ETOH withdrawal as per medicine. no acute intervention indicated for positive THC or for suspected recent K2 use. Would offer Clonidine 0.1mg PO q4 PRN opiate withdrawal symptoms; Lorazepam 1mg PO q4 PRN severe BZD withdrawal s/s

## 2019-04-28 NOTE — ED PROVIDER NOTE - CROS ED ROS STATEMENT
"CLINICAL NUTRITION SERVICES - ASSESSMENT NOTE     Nutrition Prescription    RECOMMENDATIONS FOR MDs/PROVIDERS TO ORDER:  Continue to encourage PO intakes    Malnutrition Status:    Severe malnutrition in the context of acute on chronic illness    Recommendations already ordered by Registered Dietitian (RD):  RD to order Boost Plus (per previous RD noted, but experienced bloating with lactose- chocolate or vanilla)  RD to order multivitamin to ensure pt is meeting micronutrient needs     Future/Additional Recommendations:  If PO intakes do no timprove, recommend calorie counts and possibly nutrition support.      REASON FOR ASSESSMENT  Maria Esther Wang is a/an 57 year old female assessed by the dietitian for Admission Nutrition Risk Screen for reduced oral intake over the last month    NUTRITION HISTORY  Per chart:  A quick overview of patient's recent history.  On 5/30/2018, patient presented to urgent care with complaints of abdominal bloating, 11 pound weight loss in the span of 2-3 weeks, patient had x-ray done there that showed ascites.     Per pt: reports that she has been having difficulty eating where she will have a small amount of food and then she will become nauseous and have to stop eating for the nausea to subside, then she can begin eating again. She reports eating breakfast and lunch today and reports consuming 3 meals/day at home. She consumes many Ronny dishes and family has also been bringing them in. Pt reports that she drinks 2 Boost at home/day and that the fluid helps with her meal intakes.     CURRENT NUTRITION ORDERS  Diet: Regular  Intake/Tolerance: Tolerating regular diet with decreased appetite, intermittent nausea.     LABS  Cr: .49 (L)- suspect d/t low LBM, Labs reviewed    MEDICATIONS  Medications reviewed    ANTHROPOMETRICS  Height: 157.5 cm (5' 2\")  Most Recent Weight: 47.3 kg (104 lb 3.2 oz)    IBW: 50 kg  BMI: Normal BMI (19)  Weight History: Pt reports no recent wt loss in the " past 1-2 months, but per previous RD note in April, pts UBW was 117#. This would be 11% wt los, but unsure of time frame.   Wt Readings from Last 10 Encounters:   07/10/18 47.3 kg (104 lb 3.2 oz)   07/09/18 44.5 kg (98 lb)   07/06/18 43.8 kg (96 lb 8 oz)   06/28/18 45.2 kg (99 lb 11.2 oz)   06/25/18 49 kg (108 lb)   06/23/18 47.4 kg (104 lb 8 oz)   05/30/18 47.4 kg (104 lb 9.6 oz)     Dosing Weight: 47 kg (actual)    ASSESSED NUTRITION NEEDS  Estimated Energy Needs: 0449-3393 kcals/day (30 - 35 kcals/kg )  Justification: Repletion  Estimated Protein Needs: 56-71 grams protein/day (1.2 - 1.5 grams of pro/kg)  Justification: Repletion  Estimated Fluid Needs: (1 mL/kcal)   Justification: Maintenance and Per provider pending fluid status     PHYSICAL FINDINGS  See malnutrition section below.  Ascites     MALNUTRITION  % Intake: Decreased intake does not meet criteria  % Weight Loss: > 10% in 6 months (severe) suspect pt meets criteria for severe wt loss given 15% body weight loss, however, unable to quantify time period of weight loss.   Subcutaneous Fat Loss: Facial region, Upper arm, Lower arm and Thoracic/intercostal: moderate-severe  Muscle Loss: Temporal, Facial & jaw region, Scapular bone, Thoracic region (clavicle, acromium bone, deltoid, trapezius, pectoral), Upper arm (bicep, tricep), Lower arm  (forearm), Dorsal hand: severe, Upper leg (quadricep, hamstring): moderate-severe- some fedema, Patellar region and Posterior calf: severe- some fedema  Fluid Accumulation/Edema: Mild  Malnutrition Diagnosis: Severe malnutrition in the context of acute on chronic illness    NUTRITION DIAGNOSIS  Inadequate protein-energy intake related to decreased appetite, PO intakes and nausea when eating as evidenced by pt report and severe muscle and fat wasting.     INTERVENTIONS  Implementation  Nutrition Education: Provided education on role of RD and nutrition POC. Encourage high protein and for family to bring in food for pt to  help increase overall PO intakes.   Medical food supplement therapy  Multivitamin/mineral supplement therapy     Goals  Patient to consume % of nutritionally adequate meal trays TID, or the equivalent with supplements/snacks.     Monitoring/Evaluation  Progress toward goals will be monitored and evaluated per protocol.      Vida Jennings RD, MS, LD  6B- Pager: [phone]     all other ROS negative except as per HPI

## 2019-04-28 NOTE — ED ADULT TRIAGE NOTE - CHIEF COMPLAINT QUOTE
pt states " I tried to kill myself today. I took 50 Librium, 15 Xanax, and sniffed Heroine." pt was at [facility] yesterday for drug withdrawal & d/c.

## 2019-04-28 NOTE — ED BEHAVIORAL HEALTH ASSESSMENT NOTE - DETAILS
patient unable to provide ROS in AM s/o unknown per chart from Oct 2018: Reports past attempts. See HPI for most recent attempt. Also states over 2 years ago "took Tylenol" but no medical treatment, reports "heroin overdose" and "trying to hang self" in Jan 2017 charted history of DT/seizures "restless legs" on Risperdal- had to go to ED for cogentin IM. Currently with intent to O.D. on drugs. Reports past attempts. See HPI for most recent attempt. Also states over 2 years ago "took Tylenol" but no medical treatment, reports "heroin overdose" and "trying to hang self" in Jan 2017 Fatigue Sore throat Muscle aches; mild stiffness in joints Bed unavailable at this time. Dr. Lucero informed, aware

## 2019-04-28 NOTE — ED BEHAVIORAL HEALTH ASSESSMENT NOTE - AXIS IV
Problem related to social environment/Occupational problems/Housing problems/Economic problems Problem related to social environment/Problems with primary support/Housing problems/Economic problems/Occupational problems

## 2019-04-28 NOTE — ED BEHAVIORAL HEALTH ASSESSMENT NOTE - OTHER PAST PSYCHIATRIC HISTORY (INCLUDE DETAILS REGARDING ONSET, COURSE OF ILLNESS, INPATIENT/OUTPATIENT TREATMENT)
per chart from Oct 2018:. Patient reports he is currently in treatment with Psych NP Mr. Zapata (cannot remember first name).  States his pharmacy is [facility] in [place], [place] (delivers meds to his mom). Patient reports that he is non-compliant with treatment with Psych NP Mr. Zapata (cannot remember first name).  States his pharmacy is [facility] in [place], [place] (delivers meds to his mother).

## 2019-04-29 VITALS
HEART RATE: 91 BPM | DIASTOLIC BLOOD PRESSURE: 103 MMHG | RESPIRATION RATE: 16 BRPM | OXYGEN SATURATION: 97 % | TEMPERATURE: 98 F | SYSTOLIC BLOOD PRESSURE: 159 MMHG

## 2019-04-29 PROCEDURE — 85025 COMPLETE CBC W/AUTO DIFF WBC: CPT

## 2019-04-29 PROCEDURE — 99285 EMERGENCY DEPT VISIT HI MDM: CPT

## 2019-04-29 PROCEDURE — 80307 DRUG TEST PRSMV CHEM ANLYZR: CPT

## 2019-04-29 PROCEDURE — 36415 COLL VENOUS BLD VENIPUNCTURE: CPT

## 2019-04-29 PROCEDURE — 93005 ELECTROCARDIOGRAM TRACING: CPT

## 2019-04-29 PROCEDURE — 99285 EMERGENCY DEPT VISIT HI MDM: CPT | Mod: 25

## 2019-04-29 PROCEDURE — 80053 COMPREHEN METABOLIC PANEL: CPT

## 2019-04-29 RX ORDER — ACETAMINOPHEN 500 MG
650 TABLET ORAL ONCE
Qty: 0 | Refills: 0 | Status: COMPLETED | OUTPATIENT
Start: 2019-04-29 | End: 2019-04-29

## 2019-04-29 RX ADMIN — Medication 650 MILLIGRAM(S): at 08:42

## 2019-04-29 RX ADMIN — Medication 650 MILLIGRAM(S): at 07:42

## 2019-04-29 NOTE — ED BEHAVIORAL HEALTH NOTE - BEHAVIORAL HEALTH NOTE
Mr. Wayne is a 34  year old single male, Romanian heritage, living in [facility], unemployed, with a self reported diagnosis of bipolar d/o type I and ADHD, polysubstance abuse disorder (alcohol, caffeine boosted products benzodiazepines (xanax), heroin, methadone(30 mg), cocaine, K2) , medical history of hypertension who brought himself into [facility] ED with increasing depression, suicidal thoughts and  plan to die of overdose, using benzodiazepines and opiates to OD, having stopped his psychotropic psychiatric regimen for unclear reasons. Per report, Mr. Wayne stopped taking his psychotropic regimen  (zyprexa, lamictal, atarax, and strattera) and felt worse as abusing multiple substances. Reports smoking 2 PPD and feels paranoid that he is being followed. Mr. Wayne has a history of alcohol withdrawal related seizures. Attempted to reach mother in [place] were made [phone]) however no  at that number.  Today, seen at bedside in [facility] ED, Mr. Wayne appears dishevelled, subdued, calm and cooperative: he reports "need something to feel better, klonopin or that other thing" - states he feels uncomfortable, "muscles tense" and that he feels "withdrawal." Please note that Mr. Wayne's vital signs have been stable this morning. He reports that he is suicidal and very depressed. He is agreeable to voluntary admission and is aware that there are no beds immediately available requiring that he wait.  He denies perceptual disturbances at this time.     1)Recommend clonidine 0.1 mg PO Q6 hours for symptoms of craving or withdrawal  2) Can give zyprexa 15 mg daily while in [facility] ED to address mood dysregulation and feelings of agitation.   3)CIWA protocol for alcohol withdrawal.   4) Will transfer to psychiatric unit outside [facility] ED or Admit to [facility] on voluntary status when bed is available.

## 2019-05-06 ENCOUNTER — EMERGENCY (EMERGENCY)
Facility: HOSPITAL | Age: 34
LOS: 1 days | Discharge: HOME | End: 2019-05-06
Attending: EMERGENCY MEDICINE | Admitting: EMERGENCY MEDICINE
Payer: MEDICAID

## 2019-05-06 VITALS
DIASTOLIC BLOOD PRESSURE: 90 MMHG | TEMPERATURE: 98 F | SYSTOLIC BLOOD PRESSURE: 139 MMHG | RESPIRATION RATE: 18 BRPM | HEART RATE: 80 BPM | OXYGEN SATURATION: 98 %

## 2019-05-06 VITALS
TEMPERATURE: 99 F | SYSTOLIC BLOOD PRESSURE: 133 MMHG | RESPIRATION RATE: 18 BRPM | WEIGHT: 164.02 LBS | HEART RATE: 99 BPM | DIASTOLIC BLOOD PRESSURE: 88 MMHG | OXYGEN SATURATION: 98 %

## 2019-05-06 DIAGNOSIS — F19.94 OTHER PSYCHOACTIVE SUBSTANCE USE, UNSPECIFIED WITH PSYCHOACTIVE SUBSTANCE-INDUCED MOOD DISORDER: ICD-10-CM

## 2019-05-06 DIAGNOSIS — F19.20 OTHER PSYCHOACTIVE SUBSTANCE DEPENDENCE, UNCOMPLICATED: ICD-10-CM

## 2019-05-06 LAB
ALBUMIN SERPL ELPH-MCNC: 4.3 G/DL — SIGNIFICANT CHANGE UP (ref 3.3–5)
ALP SERPL-CCNC: 65 U/L — SIGNIFICANT CHANGE UP (ref 40–120)
ALT FLD-CCNC: 32 U/L — SIGNIFICANT CHANGE UP (ref 10–45)
ANION GAP SERPL CALC-SCNC: 12 MMOL/L — SIGNIFICANT CHANGE UP (ref 5–17)
AST SERPL-CCNC: 17 U/L — SIGNIFICANT CHANGE UP (ref 10–40)
BILIRUB SERPL-MCNC: 0.5 MG/DL — SIGNIFICANT CHANGE UP (ref 0.2–1.2)
BUN SERPL-MCNC: 13 MG/DL — SIGNIFICANT CHANGE UP (ref 7–23)
CALCIUM SERPL-MCNC: 9.7 MG/DL — SIGNIFICANT CHANGE UP (ref 8.4–10.5)
CHLORIDE SERPL-SCNC: 104 MMOL/L — SIGNIFICANT CHANGE UP (ref 96–108)
CO2 SERPL-SCNC: 24 MMOL/L — SIGNIFICANT CHANGE UP (ref 22–31)
CREAT SERPL-MCNC: 0.61 MG/DL — SIGNIFICANT CHANGE UP (ref 0.5–1.3)
ETHANOL SERPL-MCNC: <10 MG/DL — SIGNIFICANT CHANGE UP (ref 0–10)
GLUCOSE SERPL-MCNC: 123 MG/DL — HIGH (ref 70–99)
HCT VFR BLD CALC: 43 % — SIGNIFICANT CHANGE UP (ref 39–50)
HGB BLD-MCNC: 14.1 G/DL — SIGNIFICANT CHANGE UP (ref 13–17)
MCHC RBC-ENTMCNC: 29.4 PG — SIGNIFICANT CHANGE UP (ref 27–34)
MCHC RBC-ENTMCNC: 32.8 GM/DL — SIGNIFICANT CHANGE UP (ref 32–36)
MCV RBC AUTO: 89.6 FL — SIGNIFICANT CHANGE UP (ref 80–100)
NRBC # BLD: 0 /100 WBCS — SIGNIFICANT CHANGE UP (ref 0–0)
PCP SPEC-MCNC: SIGNIFICANT CHANGE UP
PLATELET # BLD AUTO: 458 K/UL — HIGH (ref 150–400)
POTASSIUM SERPL-MCNC: 4 MMOL/L — SIGNIFICANT CHANGE UP (ref 3.5–5.3)
POTASSIUM SERPL-SCNC: 4 MMOL/L — SIGNIFICANT CHANGE UP (ref 3.5–5.3)
PROT SERPL-MCNC: 7.6 G/DL — SIGNIFICANT CHANGE UP (ref 6–8.3)
RBC # BLD: 4.8 M/UL — SIGNIFICANT CHANGE UP (ref 4.2–5.8)
RBC # FLD: 14 % — SIGNIFICANT CHANGE UP (ref 10.3–14.5)
SODIUM SERPL-SCNC: 140 MMOL/L — SIGNIFICANT CHANGE UP (ref 135–145)
WBC # BLD: 12.17 K/UL — HIGH (ref 3.8–10.5)
WBC # FLD AUTO: 12.17 K/UL — HIGH (ref 3.8–10.5)

## 2019-05-06 PROCEDURE — 90792 PSYCH DIAG EVAL W/MED SRVCS: CPT

## 2019-05-06 PROCEDURE — 99285 EMERGENCY DEPT VISIT HI MDM: CPT | Mod: 25

## 2019-05-06 PROCEDURE — 80053 COMPREHEN METABOLIC PANEL: CPT

## 2019-05-06 PROCEDURE — 93010 ELECTROCARDIOGRAM REPORT: CPT

## 2019-05-06 PROCEDURE — 85027 COMPLETE CBC AUTOMATED: CPT

## 2019-05-06 PROCEDURE — 93005 ELECTROCARDIOGRAM TRACING: CPT

## 2019-05-06 PROCEDURE — 36415 COLL VENOUS BLD VENIPUNCTURE: CPT

## 2019-05-06 PROCEDURE — 99284 EMERGENCY DEPT VISIT MOD MDM: CPT | Mod: 25

## 2019-05-06 PROCEDURE — 80307 DRUG TEST PRSMV CHEM ANLYZR: CPT

## 2019-05-06 RX ORDER — ONDANSETRON 8 MG/1
1 TABLET, FILM COATED ORAL
Qty: 16 | Refills: 0
Start: 2019-05-06 | End: 2019-05-09

## 2019-05-06 RX ORDER — POLYETHYLENE GLYCOL 3350 17 G/17G
17 POWDER, FOR SOLUTION ORAL
Qty: 238 | Refills: 0 | OUTPATIENT
Start: 2019-05-06 | End: 2019-05-19

## 2019-05-06 NOTE — ED PROVIDER NOTE - ATTENDING CONTRIBUTION TO CARE
35 yo male with hx of poly substance abuse with SI- well known to ED/ [facility] with multiple visits  no current SI HD stable labs elg wnl- - resting comfortably seen and eval by psych- may dc per psych- given follow up

## 2019-05-06 NOTE — ED BEHAVIORAL HEALTH ASSESSMENT NOTE - DETAILS
Was transferred to [facility] from here last week but left after a couple of days Currently with intent to O.D. on drugs. Reports past attempts. See HPI for most recent attempt. Also states over 2 years ago "took Tylenol" but no medical treatment, reports "heroin overdose" and "trying to hang self" in Jan 2017. No current intent charted history of DT/seizures "restless legs" on Risperdal- had to go to ED for cogentin IM. self not available

## 2019-05-06 NOTE — ED BEHAVIORAL HEALTH ASSESSMENT NOTE - REFERRAL / APPOINTMENT DETAILS
Appointment made for [facility]-[phone] at [address] [place] [address] 7th floor tomorrow at 10am.

## 2019-05-06 NOTE — ED BEHAVIORAL HEALTH ASSESSMENT NOTE - RISK ASSESSMENT
Risk factors include current suicidal ideation, substance intoxication, poor social structure and non-compliance with treatment.   Protective factors include +future-oriented thinking, resilient, abundantly able to bring himself back to ED for treatment if feeling worse

## 2019-05-06 NOTE — ED BEHAVIORAL HEALTH ASSESSMENT NOTE - PAST PSYCHOTROPIC MEDICATION
Risperdal, Paliperidone, Naltrexone, Bupropion, Sertraline, Paroxetine, Citalopram, Haloperidol, Lamotrigine, Atomoxetine, hydroxyzine, Olanzapine - per records

## 2019-05-06 NOTE — ED BEHAVIORAL HEALTH ASSESSMENT NOTE - CURRENT MEDICATION
Reportedly:   Gabapentin and atarax. States he is supposed to take diltiazem for HTN but is not currently."    per EM patient reported he was supposed to be taking olanzapine, clonidine, gabapentin but stopped either 4 days or 4 weeks ago.

## 2019-05-06 NOTE — ED ADULT NURSE NOTE - NSIMPLEMENTINTERV_GEN_ALL_ED
Implemented All Fall Risk Interventions:  [place] to call system. Call bell, personal items and telephone within reach. Instruct patient to call for assistance. Room bathroom lighting operational. Non-slip footwear when patient is off stretcher. Physically safe environment: no spills, clutter or unnecessary equipment. Stretcher in lowest position, wheels locked, appropriate side rails in place. Provide visual cue, wrist band, yellow gown, etc. Monitor gait and stability. Monitor for mental status changes and reorient to person, place, and time. Review medications for side effects contributing to fall risk. Reinforce activity limits and safety measures with patient and family.

## 2019-05-06 NOTE — ED PROVIDER NOTE - PMH
ADD (attention deficit disorder)    Bipolar disorder    Chronic drug abuse    HTN (hypertension)    Polysubstance abuse    Seizures

## 2019-05-06 NOTE — ED BEHAVIORAL HEALTH ASSESSMENT NOTE - HPI (INCLUDE ILLNESS QUALITY, SEVERITY, DURATION, TIMING, CONTEXT, MODIFYING FACTORS, ASSOCIATED SIGNS AND SYMPTOMS)
Patient is a 35yo Malay M, single, non-caregiver, domiciled at [facility], unemployed who is BIB self to [facility] ED as he said he had thoughts of harming himself perhaps by OD. He did not seem to have any intent though and he intitally came in with SI then changed his mind and then changed his mind back. Says he just wants to go upstairs as the shelter is not a good environment for him. When asked what he would do if discharged he said he would just go back to the shelter and try to talk to his mother in [place].  He used 3 bags of heroin last night and has been using heroin daily. He has blisters on his fingers from recent crack use (more occasional). Partially funds his habit by selling weed which he also uses regularly. Denies recent heavy drinking. He has an extensive reported psychiatric history of Bipolar I disorder, ADHD, ETOH abuse (Hx w/d symptoms including seizures), cocaine abuse, opiate abuse, K2 abuse, 20+ prior inpt psych admission most recently [facility] in November 2018 for suicidal ideation,  recent hx of active use of multiple substances, and PMhx of HTN. I wonder if all his mood symptoms are not substance-induced. Patient also had bizarre attempts at homicidal ideation which sounded like malingering. He said he was in a gang and might kill people if asked by his gang by giving them antifreeze or "blood tainted with hepatitis C." Where he would get the tainted blood he could not tell me. Patient seems to be malingering for a place to stay.     He does not consent to speaking with personal collateral, but reports good relations with parents and sister. Mother: Arnel Do  at [phone]. He also gives sister's phone: [phone] (Angelika) but refuses consent to speak with her at this time.      Per psyckes: Diagnoses: of stimulant use, opioid use, other psychostimulant use, cocaine use, alcohol use - along with unspecified depressive/bipolar/psychosis; multiple psych admissions along with ER visits without admissions.

## 2019-05-06 NOTE — ED BEHAVIORAL HEALTH ASSESSMENT NOTE - OTHER PAST PSYCHIATRIC HISTORY (INCLUDE DETAILS REGARDING ONSET, COURSE OF ILLNESS, INPATIENT/OUTPATIENT TREATMENT)
Patient reports that he is non-compliant with treatment with Psych NP Mr. Zapata (cannot remember first name).  States his pharmacy is [facility] in [place], [place] (delivers meds to his mother).

## 2019-05-06 NOTE — ED BEHAVIORAL HEALTH ASSESSMENT NOTE - DIFFERENTIAL
substance induced mood d/o vs bipolar I  Opiate use disorder  Sedative-hypnotic use disorder  cocaine use d/o  cannabis/etoh/K2 use d/o

## 2019-05-06 NOTE — ED PROVIDER NOTE - OBJECTIVE STATEMENT
34M w/ EtOH, heroine/cocaine abuse presents after getting high this AM (w/ heroin) and having suicidal ideations.  He has a significant history of substance abuse.  He says that during times when he isn't high, he feels sick and wants to kill himself via overdose.  When he is high, he doesn't feel that way and he says he doesn't feel suicidal right now during the interview/exam.  He also reports that when he hangs out with gangs in [place], he wants to fit in so claims he is willing to kill someone if asked by the gang.  His current plan is to use poison (antifreeze) and tainted blood (w/ hep C).  He owns knives at home, does not have any weapon with him, but says that if asked, the gang would provide him with a weapon.    He was recently in admitted in [place] for SI/HI.  He was also recently discharged from a hospital ED in [place] and came here instead.  He lives in a home in [place] w/ his parents who work during the day.  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

## 2019-05-06 NOTE — ED BEHAVIORAL HEALTH ASSESSMENT NOTE - DESCRIPTION (FIRST USE, LAST USE, QUANTITY, FREQUENCY, DURATION)
pack a day Heavy pattern of alcohol, heroin snorting, crack/cocaine use, recreational xanax, marijuana abuse since adolescence. Daily recreational Last week - crack/cocaine Street methadone and heroin both by sniffing and injecting (last used 3 bags last night) Hx of heavy use hx of K2 abuse, caffeine abuse (liters of coffee plus red bulls as cocaine substitute per chart)

## 2019-05-06 NOTE — ED ADULT NURSE NOTE - NSHOSCREENINGSIGNS_ED_ALL_ED
alcohol or other drug use/depicts violent or destructive behaviors in artistic or other creative expressions

## 2019-05-06 NOTE — ED BEHAVIORAL HEALTH ASSESSMENT NOTE - DESCRIPTION
Pt understood when I told him he would not be admitted. I obtained an appointment at [facility] for him tomorrow at 10am but when I returned to tell him of appointment he had left the ED. per chart from Oct 2018: history of seizures, HTN - not on meds for these see HPI

## 2019-05-06 NOTE — ED BEHAVIORAL HEALTH ASSESSMENT NOTE - SUMMARY
Patient is a 33yo Spanish M, single, non-caregiver, domiciled at [facility], unemployed who is BIB self to [facility] ED as he said he had thoughts of harming himself perhaps by OD. He did not seem to have any intent though and he intitally came in with SI then changed his mind and then changed his mind back. Says he just wants to go upstairs as the shelter is not a good environment for him. When asked what he would do if discharged he said he would just go back to the shelter and try to talk to his mother in [place].  He used 3 bags of heroin last night and has been using heroin daily. He has blisters on his fingers from recent crack use (more occasional). Partially funds his habit by selling weed which he also uses regularly. Denies recent heavy drinking. He has an extensive reported psychiatric history of Bipolar I disorder, ADHD, ETOH abuse (Hx w/d symptoms including seizures), cocaine abuse, opiate abuse, K2 abuse, 20+ prior inpt psych admission most recently [facility] in November 2018 for suicidal ideation,  recent hx of active use of multiple substances, and PMhx of HTN. I wonder if all his mood symptoms are not substance-induced. Patient also had bizarre attempts at homicidal ideation which sounded like malingering. He said he was in a gang and might kill people if asked by his gang by giving them antifreeze or "blood tainted with hepatitis C." Where he would get the tainted blood he could not tell me. Patient seems to be malingering for a place to stay.     He does not consent to speaking with personal collateral, but reports good relations with parents and sister. Mother: Arnel Do  at [phone]. He also gives sister's phone: [phone] (Angelika) but refuses consent to speak with her at this time.      Per psyckes: Diagnoses: of stimulant use, opioid use, other psychostimulant use, cocaine use, alcohol use - along with unspecified depressive/bipolar/psychosis; multiple psych admissions along with ER visits without admissions.    1)Discharge as pt not a danger to self or others at this time. Suspect he is malingering for a place to stay.     2)Appointment made for [facility]-[phone] at [address] 7th floor tomorrow at 10am.

## 2019-05-06 NOTE — ED ADULT TRIAGE NOTE - CHIEF COMPLAINT QUOTE
Patient complaining of HI, SI, visual hallucinations, nausea and diarrhea.  Patient states thoughts of "hurting someone to join a gang", states last suicide attempt three days ago.  Patient states last used heroin at 3AM today, ETOH yesterday.  Patient denies any AH, vomiting, fevers, chills, or any other complaints at this time.  EKG in progress, Security notified, patient wanded and belongings checked.

## 2019-05-06 NOTE — ED PROVIDER NOTE - CARE PROVIDER_API CALL
., .  [facility] [place] [facility]   Address: [address] [place] [id number]  Phone: [phone]  Phone: (   )    -  Fax: (   )    -  Follow Up Time:

## 2019-05-06 NOTE — ED ADULT NURSE NOTE - OBJECTIVE STATEMENT
Pt self presents with complaints of SI/HI Pt states "I feel ok now but if I leave I will want to kill myself with heroine overdose. Gangs will ask me to hurt people too. Pt self presents with complaints of SI/HI Pt states "I feel ok now but if I leave I will want to kill myself with heroine overdose. Gangs will ask me to hurt people too, like kill them or poison them. I don't want to hurt anyone right now". Pt endorses owning knives at home, denies owning firearms. Pt denies any auditory or visual hallucinations. Pt denies any fevers, no lightheadedness, no dizziness, no headache, no cp, no sob, no n/v, no changes to bowels, no urinary complaints.

## 2019-05-06 NOTE — ED PROVIDER NOTE - PROVIDER TOKENS
FREE:[LAST:[.],FIRST:[.],PHONE:[(   )    -],FAX:[(   )    -],ADDRESS:[[facility]   Address: [address] [place] [address]  Phone: [phone]]]

## 2019-05-06 NOTE — ED PROVIDER NOTE - CLINICAL SUMMARY MEDICAL DECISION MAKING FREE TEXT BOX
Labs, EKG jeramy.  Discussed case w/ psychiatry.  Patient not actively suicidal or homicidal.  Determine to be of low risk.  Outpatient appointment made for [facility] (outpatient rehab).

## 2019-05-07 PROBLEM — F31.9 BIPOLAR DISORDER, UNSPECIFIED: Chronic | Status: INACTIVE | Noted: 2017-01-12 | Resolved: 2019-05-06

## 2019-05-07 PROBLEM — I10 ESSENTIAL (PRIMARY) HYPERTENSION: Chronic | Status: INACTIVE | Noted: 2019-03-30 | Resolved: 2019-05-06

## 2019-05-10 DIAGNOSIS — F14.10 COCAINE ABUSE, UNCOMPLICATED: ICD-10-CM

## 2019-05-10 DIAGNOSIS — F17.200 NICOTINE DEPENDENCE, UNSPECIFIED, UNCOMPLICATED: ICD-10-CM

## 2019-05-10 DIAGNOSIS — R45.851 SUICIDAL IDEATIONS: ICD-10-CM

## 2019-05-10 DIAGNOSIS — I10 ESSENTIAL (PRIMARY) HYPERTENSION: ICD-10-CM

## 2019-05-10 DIAGNOSIS — F11.10 OPIOID ABUSE, UNCOMPLICATED: ICD-10-CM
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
## 2019-05-14 ENCOUNTER — EMERGENCY (EMERGENCY)
Facility: HOSPITAL | Age: 34
LOS: 1 days | Discharge: HOME | End: 2019-05-14
Attending: EMERGENCY MEDICINE | Admitting: EMERGENCY MEDICINE
Payer: MEDICAID

## 2019-05-14 VITALS
TEMPERATURE: 98 F | SYSTOLIC BLOOD PRESSURE: 169 MMHG | DIASTOLIC BLOOD PRESSURE: 127 MMHG | OXYGEN SATURATION: 97 % | HEART RATE: 122 BPM | RESPIRATION RATE: 18 BRPM

## 2019-05-14 DIAGNOSIS — R45.1 RESTLESSNESS AND AGITATION: ICD-10-CM

## 2019-05-14 DIAGNOSIS — Z79.899 OTHER LONG TERM (CURRENT) DRUG THERAPY: ICD-10-CM

## 2019-05-14 DIAGNOSIS — F19.10 OTHER PSYCHOACTIVE SUBSTANCE ABUSE, UNCOMPLICATED: ICD-10-CM

## 2019-05-14 DIAGNOSIS — F17.200 NICOTINE DEPENDENCE, UNSPECIFIED, UNCOMPLICATED: ICD-10-CM

## 2019-05-14 DIAGNOSIS — I10 ESSENTIAL (PRIMARY) HYPERTENSION: ICD-10-CM

## 2019-05-14 PROCEDURE — 99284 EMERGENCY DEPT VISIT MOD MDM: CPT | Mod: 25

## 2019-05-14 NOTE — ED ADULT TRIAGE NOTE - ARRIVAL INFO ADDITIONAL COMMENTS
pt states he needs the heroin flushed out of him with an IV.  was at another ER tonight and states they did nothing for him.  he would also like zofran and toradol.

## 2019-05-15 VITALS
TEMPERATURE: 98 F | HEART RATE: 100 BPM | RESPIRATION RATE: 18 BRPM | OXYGEN SATURATION: 97 % | DIASTOLIC BLOOD PRESSURE: 88 MMHG | SYSTOLIC BLOOD PRESSURE: 138 MMHG

## 2019-05-15 PROCEDURE — 81001 URINALYSIS AUTO W/SCOPE: CPT

## 2019-05-15 PROCEDURE — 96374 THER/PROPH/DIAG INJ IV PUSH: CPT

## 2019-05-15 PROCEDURE — 36415 COLL VENOUS BLD VENIPUNCTURE: CPT

## 2019-05-15 PROCEDURE — 85025 COMPLETE CBC W/AUTO DIFF WBC: CPT

## 2019-05-15 PROCEDURE — 80053 COMPREHEN METABOLIC PANEL: CPT

## 2019-05-15 PROCEDURE — 80307 DRUG TEST PRSMV CHEM ANLYZR: CPT

## 2019-05-15 PROCEDURE — 99284 EMERGENCY DEPT VISIT MOD MDM: CPT | Mod: 25

## 2019-05-15 RX ORDER — SODIUM CHLORIDE 9 MG/ML
1000 INJECTION INTRAMUSCULAR; INTRAVENOUS; SUBCUTANEOUS ONCE
Refills: 0 | Status: COMPLETED | OUTPATIENT
Start: 2019-05-15 | End: 2019-05-15

## 2019-05-15 RX ORDER — ONDANSETRON 8 MG/1
4 TABLET, FILM COATED ORAL ONCE
Refills: 0 | Status: COMPLETED | OUTPATIENT
Start: 2019-05-15 | End: 2019-05-15

## 2019-05-15 RX ADMIN — ONDANSETRON 4 MILLIGRAM(S): 8 TABLET, FILM COATED ORAL at 02:09

## 2019-05-15 RX ADMIN — SODIUM CHLORIDE 2000 MILLILITER(S): 9 INJECTION INTRAMUSCULAR; INTRAVENOUS; SUBCUTANEOUS at 02:18

## 2019-05-15 RX ADMIN — Medication 50 MILLIGRAM(S): at 02:09

## 2019-05-15 NOTE — ED PROVIDER NOTE - NS HIV RISK FACTOR YES
Continuity of Care Document

 Created on: 2015



LINDERMITULNAGY

External Reference #: [id number]

: 2014

Sex: Female



Demographics







 Address  [address] [place]  [address]

 

 Home Phone  [phone]

 

 Preferred Language  Unknown

 

 Marital Status  Never 

 

 Anglican Affiliation  Atheism

 

 Race  Black or 

 

 Ethnic Group  Unknown





Author







 Author  [facility]

 

 Organization  [facility]

 

 Address  [facility]

 [address]

[place]  [address]



 

 Phone  Unavailable







Support







 Name  Relationship  Address  Phone

 

 REYNALDO LAO II, D.O.  Caregiver  [address]

[place]  [address] [phone]

 

 JOSEFINA LARSEN MD  Caregiver  [address] [place]  [address]  [phone]

 

 AKIL GANDHI  Next Of Kin  [address] [place]  [address] [phone]







Insurance Providers







 Payer Name  Policy Number  Subscriber Name  Relationship

 

 [facility]  [id number]  LinderAlvina reza  18 Self / Same As Patient







Advance Directives







 Directive  Response  Recorded Date/Time

 

 Advance Directives  No  11/10/14 7:07pm

 

 Living Will  No  11/10/14 7:07pm

 

 Health Care Proxy  No  03/29/15 1:55pm

 

 Power of  for Health Care  No  11/10/14 7:07pm

 

 Organ, Tissue, or Eye Donor  No  11/10/14 7:06pm

 

 Do you have a signed organ donor card?  No  11/10/14 7:06pm







Problems

Medical Problems





 Problem  Onset Date  Status

 

 Colic  Unknown  Active

 

 Colic  Unknown  Active

 

 Colic  Unknown  Active

 

 Lactose intolerance  Unknown  Active

 

 Colic  Unknown  Active

 

 Rectal mucosa prolapse  Unknown  Active

 

 Fever  Unknown  Active

 

 Viral illness  Unknown  Active







Medications







 Medication  Dose  Route  Sig  Days/Qty  Instructions  Order Date  Discontinued 
Date  Status

 

 No Known Medications                 10/15/14     Active

 

 Acetaminophen/Hydrocodone Bitart (Vicodin 5-300 Mg Tablet)  1 Each  PO  AS 
NEEDED For PAIN        10/17/14     Active

 

 [levsin drops]  4 Drop  PO  EVERY 4 HOURS AS NEEDED For colic  10 Qty     11/10
/14     Active

 

 [Levsin drops]  5 Drop  PO  FOUR TIMES DAILY For ABDOMINAL CRAMPING  1 Qty     
01/09/15     Active







Social History

No social history.



Hospital Discharge Instructions

No hospital discharge instructions.



Plan of Care

No plan of care.



Functional Status







 Query  Response  Date Recorded

 

 Patient Behavior  Crying

  2015 1:54pm







Allergies, Adverse Reactions, Alerts







 Allergen  Type  Severity  Reaction  Status  Last Updated

 

 NO KNOWN ALLERGIES           Active  10/15/14







Immunizations







 Name  Given  Type

 

 Hx Diphtheria, Pertussis, Tetanus Vaccination  Up To Date  Historical

 

 Hx Influenza Vaccination  No  Historical

 

 Hx Pneumococcal Vaccination  No  Historical

 

 Hx Tetanus, Diphtheria Vaccination  No  Historical







Vital Signs

Acute Vital Signs





 Vital  Response  Date/Time

 

 Temperature (Fahrenheit)  100.6 degrees F (97.6 - 99.5)   

 

 Temperature Source  Rectal     

 

 Pulse Rate (adult)  158 bpm (60 - 90)   

 

 Respiratory Rate  34 bpm (12 - 24)   

 

 Respiratory Rate (Infant 6wks-1yr)  34 bpm (20 - 40)   

 

 O2 Sat by Pulse Oximetry  100 % (90 - 100)   

 

 Oxygen Delivery Method      

 

 Pain Intensity  6     

 

 Pain Duration  3-6 Hours     

 

 Height  2 ft 3 in    

 

 Weight  14 lb    

 

 Body Mass Index  14.0 kg/m^2    







Results







 Test  Source  Date  Result  Interp.  Ref. Range  Comments

 

 Urine Amorphous Sediment     2015 3:45pm  1+  H  -  Urine obtained 
via URINE,CATHETERA CULTURE HAS BEEN ADDED TO THIS SPECIMEN PER PROTOCOL

A CULTURE HAS BEEN ADDED TO THIS SPECIMEN PER PROTOCOL

 

 Urine Coarse Granular Casts     2015 3:45pm  0-1 /lpf  H  -  Urine 
obtained via URINE,CATHETERA CULTURE HAS BEEN ADDED TO THIS SPECIMEN PER 
PROTOCOL

A CULTURE HAS BEEN ADDED TO THIS SPECIMEN PER PROTOCOL

 

 Urine Hyaline Casts     2015 3:45pm  1-2 /lpf  H  -  Urine obtained 
via URINE,CATHETERA CULTURE HAS BEEN ADDED TO THIS SPECIMEN PER PROTOCOL

A CULTURE HAS BEEN ADDED TO THIS SPECIMEN PER PROTOCOL

 

 Urine Bacteria     2015 3:45pm  2+  H  -  Urine obtained via URINE,
CATHETERA CULTURE HAS BEEN ADDED TO THIS SPECIMEN PER PROTOCOL

A CULTURE HAS BEEN ADDED TO THIS SPECIMEN PER PROTOCOL

 

 Urine Squamous Epithelial Cells     2015 3:45pm  Negative /hpf     -
  Urine obtained via URINE,CATHETERA CULTURE HAS BEEN ADDED TO THIS SPECIMEN 
PER PROTOCOL

A CULTURE HAS BEEN ADDED TO THIS SPECIMEN PER PROTOCOL

 

 Urine WBC     2015 3:45pm  5-9 /hpf  H  -  many wbc clumps observed

 

 Urine RBC     2015 3:45pm  1-2 /hpf  H  -  Urine obtained via URINE,
CATHETERA CULTURE HAS BEEN ADDED TO THIS SPECIMEN PER PROTOCOL

A CULTURE HAS BEEN ADDED TO THIS SPECIMEN PER PROTOCOL

 

 Urine Leukocyte Esterase     2015 3:45pm  Negative     -  Urine 
obtained via URINE,CATHETER

 

 Urine Nitrate     2015 3:45pm  Negative     -  Urine obtained via 
URINE,CATHETER

 

 Urine Urobilinogen     2015 3:45pm  0.2 mg/dl E.U./dL     -  Urine 
obtained via URINE,CATHETER

 

 Urine Protein     2015 3:45pm  Negative mg/dL     -  Urine obtained 
via URINE,CATHETER

 

 Urine pH     2015 3:45pm  6.0     -  Urine obtained via URINE,
CATHETER

 

 Urine Occult Blood     2015 3:45pm  Trace intact  H  -  URINE 
CULTURE ORDERED PER MEDICAL STAFF-APPROVED PROTOCOLFOR LAB.

 

 Urine Specific Gravity     2015 3:45pm  1.025  N  1.010-1.025  Urine 
obtained via URINE,CATHETER

 

 Urine Ketones     2015 3:45pm  Negative mg/dL     -  Urine obtained 
via URINE,CATHETER

 

 Urine Bilirubin     2015 3:45pm  Negative     -  Urine obtained via 
URINE,CATHETER

 

 Urine Glucose (UA)     2015 3:45pm  Negative mg/dL     -  Urine 
obtained via URINE,CATHETER

 

 Urine Appearance     2015 3:45pm  Clear     -  Urine obtained via 
URINE,CATHETER

 

 Urine Color     2015 3:45pm  Yellow     -  Urine obtained via URINE,
CATHETER

 

 Direct Bilirubin     2014 12:00pm  0.30 mg/dL  N  0.00-0.30   

 

 Total Bilirubin     2014 12:00pm  5.20 mg/dL  N  0.00-7.00   

 

 Urine Culture  Urine,Catheterized  2015 3:45pm  Pending         







Procedures







 Procedure  Status  Date  Provider(s)

 

 Abd Mult Views/Kub&Up&/Or Decu  completed  01/09/15  AUSTYN WALTON D.O.

 

 X-ray of chest, PA and lateral views  completed  03/29/15  JOSEFINA LARSEN MD







Encounters







 Encounter  Location  Date/Time

 

 Departed Emergency Room  [facility]  03/29/15 1:55pm

 

 Departed Emergency Room  [facility]  03/24/15 7:12pm

 

 Departed Emergency Room  [facility]  01/09/15 4:46pm











 Recent Diagnosis Declined

## 2019-05-15 NOTE — ED PROVIDER NOTE - OBJECTIVE STATEMENT
34M with etoh and substance abuse who p/w feeling restless and anxious after using multiple drugs today. He denies si/hi or hallucination. he is requesting meds to help him calm down. He has been to rehab multiple times. No cp/sob, no vomits, mild nausea. No f/c.

## 2019-05-15 NOTE — ED PROVIDER NOTE - PROGRESS NOTE DETAILS
pt feels better, stable for dc. Advised therapy, rehab, refrain from substance abuse. Return to ER for any worsening sx or concern

## 2019-05-15 NOTE — ED PROVIDER NOTE - NSFOLLOWUPINSTRUCTIONS_ED_ALL_ED_FT
Substance Use Disorder  Substance use disorder occurs when a person's repeated use of drugs or alcohol interferes with his or her ability to be productive. This disorder can cause problems with mental and physical health. It can affect your ability to have healthy relationships, and it can keep you from being able to meet your responsibilities at work, home, or school. It can also lead to addiction, which is a condition in which the person cannot stop using the substance consistently for a period of time.    The most commonly abused substances include:  Alcohol.  Tobacco.  Marijuana.  Stimulants, such as cocaine and methamphetamine.  Hallucinogens, such as LSD and PCP.  Opioids, such as some prescription pain medicines and heroin.  What are the causes?  This condition may develop due to many complex social, psychological, or physical reasons, such as:  Stress.  Abuse.  Peer pressure.  Anxiety or depression.  What increases the risk?  This condition is more likely to develop in people who:  Use substances to cope with stress.  Have been abused.  Have a mental health disorder, such as depression.  Have a family history of substance use disorder.  What are the signs or symptoms?  Symptoms of this condition include:  Using the substance for longer periods of time or at a higher dosage than what is normal or intended.  Having a lasting desire to use the substance.  Being unable to slow down or stop your use of the substance.  Spending an abnormal amount of time getting the substance, using the substance, or recovering from using the substance.  Craving the substance.  Using the substance in a way that interferes with work, school, social activities, and personal relationships.  Using the substance even after having negative consequences, such as:  Health problems.  Legal or financial troubles.  Job loss.  Broken relationships.  Needing more and more of the substance to get the same effect (developing tolerance).  Experiencing unpleasant symptoms if you do not use the substance (withdrawal).  Using the substance to avoid withdrawal.  How is this diagnosed?  This condition may be diagnosed based on:  A physical exam.  Your history of substance use.  Your symptoms. This includes:  How substance use affects your life.  Changes in personality, behaviors, and mood.  Having at least two symptoms of substance use disorder within a 12-month period.  Health issues related to substance use, such as liver damage, shortness of breath, fatigue, cough, or heart problems.  Blood or urine tests to screen for alcohol and drugs.  How is this treated?  ImageThis condition may be treated by:  Stopping substance use safely. This may require taking medicines and being closely observed for several days.  Taking part in group and individual counseling from mental health providers who help people with substance use disorder.  Staying at a live-in (residential) treatment center for several days or weeks.  Attending daily counseling sessions at a treatment center.  Taking medicine as told by your health care provider:  To ease symptoms and prevent complications during withdrawal.  To treat other mental health issues, such as depression or anxiety.  To block cravings by causing the same effects as the substance.  To block the effects of the substance or replace good sensations with unpleasant ones.  Going to a support group to share your experience with others who are going through the same thing.  Recovery can be a long process. Many people who undergo treatment start using the substance again after stopping (relapse). If you relapse, that does not mean that treatment will not work.    Follow these instructions at home:  Image   Take over-the-counter and prescription medicines only as told by your health care provider.  Do not use any drugs or alcohol.  Attend support groups as needed. These groups, including 12-step programs like Alcoholics Anonymous and Narcotics Anonymous, are an important part of long-term recovery for many people.  Keep all follow-up visits as told by your health care providers. This is important. This includes continuing to work with therapists and support groups.  Contact a health care provider if:  You cannot take your medicines as told.  Your symptoms get worse.  You have trouble resisting the urge to use drugs or alcohol.  Get help right away if you:  Relapse.  Think that you may have taken too much of a drug. The hotline of the National Poison Control Center is (228) 913-2333.  Have signs of an overdose. Symptoms include:  Chest pain.  Confusion.  Sleepiness or difficulty staying awake.  Slowed breathing.  Nausea or vomiting.  A seizure.  Have serious thoughts about hurting yourself or someone else.  Drug overdose is an emergency. Do not wait to see if the symptoms will go away. Get medical help right away. Call your local emergency services (588 in the [place] Do not drive yourself to the hospital.     If you ever feel like you may hurt yourself or others, or have thoughts about taking your own life, get help right away. You can go to your nearest emergency department or call:   Your local emergency services (900 in the [place]   A suicide crisis helpline, such as the National Suicide Prevention Lifeline at 1-138.576.8113. This is open 24 hours a day.   Summary  Substance use disorder occurs when a person's repeated use of drugs or alcohol interferes with his or her ability to be productive. It can affect your ability to have healthy relationships, keep you from being able to meet your responsibilities at work, home, or school, and lead to addiction.  Taking part in group and individual counseling from mental health providers is one possible treatment for people with substance use disorder.  Recovery can be a long process. Many people who undergo treatment start using the substance again after stopping (relapse). A relapse does not mean that treatment will not work.  Attend support groups as needed, such as Alcoholics Anonymous and Narcotics Anonymous. These groups are an important part of long-term recovery for many people.  This information is not intended to replace advice given to you by your health care provider. Make sure you discuss any questions you have with your health care provider.

## 2019-05-15 NOTE — ED PROVIDER NOTE - CLINICAL SUMMARY MEDICAL DECISION MAKING FREE TEXT BOX
Pt with polysubstance abuse feeling restless, anxious and like he can't sleep after using multiple drugs tonight. Pt is calm and cooperative but noted to be tachy and appears dehydrated. No si/hi, no indication for emergent psych eval. he is not in ETOH w/d. Pt was given IVFs, zofran and librium (given hx of etoh w/d) with improvement. Stable for DC.

## 2019-05-15 NOTE — ED PROVIDER NOTE - PHYSICAL EXAMINATION
GEN: Well developed, well nourished, awake, alert, oriented to person, place, time/situation and in no apparent distress. NTAF  ENT: Airway patent, Nasal mucosa clear. Mouth with normal mucosa.  EYES: Clear bilaterally. perrl, eomi  RESPIRATORY: Breathing comfortably with normal RR.  CARDIAC: Regular rate and rhythm  ABDOMEN: Soft, nontender, +bowel sounds, no rebound, rigidity, or guarding.  MSK: Range of motion is not limited, no deformities noted.  NEURO: Alert and oriented x 3. Cn 2-12 intact. Strength 5/5 and sensation intact in all 4 extremities. no tremors Gait normal.   SKIN: Skin normal color for race, warm, dry and intact. No evidence of rash.  PSYCH: Alert and oriented to person, place, time/situation. anxious mood and affect. no apparent risk to self or others.

## 2019-05-20 ENCOUNTER — EMERGENCY (EMERGENCY)
Facility: HOSPITAL | Age: 34
LOS: 1 days | Discharge: HOME | End: 2019-05-20
Attending: EMERGENCY MEDICINE | Admitting: EMERGENCY MEDICINE
Payer: MEDICAID

## 2019-05-20 VITALS
DIASTOLIC BLOOD PRESSURE: 87 MMHG | RESPIRATION RATE: 18 BRPM | TEMPERATURE: 99 F | SYSTOLIC BLOOD PRESSURE: 132 MMHG | HEIGHT: 63 IN | HEART RATE: 115 BPM | OXYGEN SATURATION: 98 % | WEIGHT: 160.06 LBS

## 2019-05-20 VITALS
OXYGEN SATURATION: 98 % | TEMPERATURE: 98 F | DIASTOLIC BLOOD PRESSURE: 82 MMHG | HEART RATE: 100 BPM | SYSTOLIC BLOOD PRESSURE: 118 MMHG | RESPIRATION RATE: 18 BRPM

## 2019-05-20 DIAGNOSIS — F11.10 OPIOID ABUSE, UNCOMPLICATED: ICD-10-CM

## 2019-05-20 DIAGNOSIS — F12.10 CANNABIS ABUSE, UNCOMPLICATED: ICD-10-CM

## 2019-05-20 DIAGNOSIS — F19.20 OTHER PSYCHOACTIVE SUBSTANCE DEPENDENCE, UNCOMPLICATED: ICD-10-CM

## 2019-05-20 DIAGNOSIS — R45.851 SUICIDAL IDEATIONS: ICD-10-CM

## 2019-05-20 DIAGNOSIS — F31.9 BIPOLAR DISORDER, UNSPECIFIED: ICD-10-CM

## 2019-05-20 DIAGNOSIS — F43.20 ADJUSTMENT DISORDER, UNSPECIFIED: ICD-10-CM

## 2019-05-20 LAB
ALBUMIN SERPL ELPH-MCNC: 3.2 G/DL — LOW (ref 3.3–5)
ALP SERPL-CCNC: 59 U/L — SIGNIFICANT CHANGE UP (ref 40–120)
ALT FLD-CCNC: 21 U/L — SIGNIFICANT CHANGE UP (ref 10–45)
AMPHET UR-MCNC: NEGATIVE — SIGNIFICANT CHANGE UP
ANION GAP SERPL CALC-SCNC: 12 MMOL/L — SIGNIFICANT CHANGE UP (ref 5–17)
ANISOCYTOSIS BLD QL: SLIGHT — SIGNIFICANT CHANGE UP
APPEARANCE UR: CLEAR — SIGNIFICANT CHANGE UP
AST SERPL-CCNC: 32 U/L — SIGNIFICANT CHANGE UP (ref 10–40)
BARBITURATES UR SCN-MCNC: NEGATIVE — SIGNIFICANT CHANGE UP
BASOPHILS # BLD AUTO: 0.28 K/UL — HIGH (ref 0–0.2)
BASOPHILS NFR BLD AUTO: 1.8 % — SIGNIFICANT CHANGE UP (ref 0–2)
BENZODIAZ UR-MCNC: POSITIVE
BILIRUB SERPL-MCNC: 0.4 MG/DL — SIGNIFICANT CHANGE UP (ref 0.2–1.2)
BILIRUB UR-MCNC: NEGATIVE — SIGNIFICANT CHANGE UP
BUN SERPL-MCNC: 11 MG/DL — SIGNIFICANT CHANGE UP (ref 7–23)
CALCIUM SERPL-MCNC: 9 MG/DL — SIGNIFICANT CHANGE UP (ref 8.4–10.5)
CHLORIDE SERPL-SCNC: 98 MMOL/L — SIGNIFICANT CHANGE UP (ref 96–108)
CO2 SERPL-SCNC: 23 MMOL/L — SIGNIFICANT CHANGE UP (ref 22–31)
COCAINE METAB.OTHER UR-MCNC: NEGATIVE — SIGNIFICANT CHANGE UP
COLOR SPEC: YELLOW — SIGNIFICANT CHANGE UP
CREAT SERPL-MCNC: 0.82 MG/DL — SIGNIFICANT CHANGE UP (ref 0.5–1.3)
DIFF PNL FLD: NEGATIVE — SIGNIFICANT CHANGE UP
EOSINOPHIL # BLD AUTO: 0 K/UL — SIGNIFICANT CHANGE UP (ref 0–0.5)
EOSINOPHIL NFR BLD AUTO: 0 % — SIGNIFICANT CHANGE UP (ref 0–6)
ETHANOL SERPL-MCNC: <10 MG/DL — SIGNIFICANT CHANGE UP (ref 0–10)
GIANT PLATELETS BLD QL SMEAR: PRESENT — SIGNIFICANT CHANGE UP
GLUCOSE SERPL-MCNC: 107 MG/DL — HIGH (ref 70–99)
GLUCOSE UR QL: NEGATIVE — SIGNIFICANT CHANGE UP
HCT VFR BLD CALC: 45.7 % — SIGNIFICANT CHANGE UP (ref 39–50)
HGB BLD-MCNC: 15 G/DL — SIGNIFICANT CHANGE UP (ref 13–17)
KETONES UR-MCNC: >=80 MG/DL
LEUKOCYTE ESTERASE UR-ACNC: NEGATIVE — SIGNIFICANT CHANGE UP
LYMPHOCYTES # BLD AUTO: 18.4 % — SIGNIFICANT CHANGE UP (ref 13–44)
LYMPHOCYTES # BLD AUTO: 2.91 K/UL — SIGNIFICANT CHANGE UP (ref 1–3.3)
MACROCYTES BLD QL: SLIGHT — SIGNIFICANT CHANGE UP
MANUAL SMEAR VERIFICATION: SIGNIFICANT CHANGE UP
MCHC RBC-ENTMCNC: 28.8 PG — SIGNIFICANT CHANGE UP (ref 27–34)
MCHC RBC-ENTMCNC: 32.8 GM/DL — SIGNIFICANT CHANGE UP (ref 32–36)
MCV RBC AUTO: 87.7 FL — SIGNIFICANT CHANGE UP (ref 80–100)
METAMYELOCYTES # FLD: 0.9 % — HIGH (ref 0–0)
METHADONE UR-MCNC: NEGATIVE — SIGNIFICANT CHANGE UP
MICROCYTES BLD QL: SLIGHT — SIGNIFICANT CHANGE UP
MONOCYTES # BLD AUTO: 0.14 K/UL — SIGNIFICANT CHANGE UP (ref 0–0.9)
MONOCYTES NFR BLD AUTO: 0.9 % — LOW (ref 2–14)
NEUTROPHILS # BLD AUTO: 12.2 K/UL — HIGH (ref 1.8–7.4)
NEUTROPHILS NFR BLD AUTO: 59.6 % — SIGNIFICANT CHANGE UP (ref 43–77)
NEUTS BAND # BLD: 17.5 % — HIGH (ref 0–8)
NITRITE UR-MCNC: NEGATIVE — SIGNIFICANT CHANGE UP
OPIATES UR-MCNC: POSITIVE
OVALOCYTES BLD QL SMEAR: SLIGHT — SIGNIFICANT CHANGE UP
PCP SPEC-MCNC: SIGNIFICANT CHANGE UP
PCP UR-MCNC: NEGATIVE — SIGNIFICANT CHANGE UP
PH UR: 6 — SIGNIFICANT CHANGE UP (ref 5–8)
PLAT MORPH BLD: ABNORMAL
PLATELET # BLD AUTO: 263 K/UL — SIGNIFICANT CHANGE UP (ref 150–400)
PLATELET COUNT - ESTIMATE: NORMAL — SIGNIFICANT CHANGE UP
POLYCHROMASIA BLD QL SMEAR: SLIGHT — SIGNIFICANT CHANGE UP
POTASSIUM SERPL-MCNC: 3.6 MMOL/L — SIGNIFICANT CHANGE UP (ref 3.5–5.3)
POTASSIUM SERPL-SCNC: 3.6 MMOL/L — SIGNIFICANT CHANGE UP (ref 3.5–5.3)
PROT SERPL-MCNC: 6.8 G/DL — SIGNIFICANT CHANGE UP (ref 6–8.3)
PROT UR-MCNC: 100 MG/DL
RBC # BLD: 5.21 M/UL — SIGNIFICANT CHANGE UP (ref 4.2–5.8)
RBC # FLD: 13.6 % — SIGNIFICANT CHANGE UP (ref 10.3–14.5)
RBC BLD AUTO: ABNORMAL
SMUDGE CELLS # BLD: PRESENT — SIGNIFICANT CHANGE UP
SODIUM SERPL-SCNC: 133 MMOL/L — LOW (ref 135–145)
SP GR SPEC: >=1.03 — SIGNIFICANT CHANGE UP (ref 1–1.03)
THC UR QL: POSITIVE
UROBILINOGEN FLD QL: 0.2 E.U./DL — SIGNIFICANT CHANGE UP
VARIANT LYMPHS # BLD: 0.9 % — SIGNIFICANT CHANGE UP (ref 0–6)
WBC # BLD: 15.83 K/UL — HIGH (ref 3.8–10.5)
WBC # FLD AUTO: 15.83 K/UL — HIGH (ref 3.8–10.5)

## 2019-05-20 PROCEDURE — 81001 URINALYSIS AUTO W/SCOPE: CPT

## 2019-05-20 PROCEDURE — 96361 HYDRATE IV INFUSION ADD-ON: CPT

## 2019-05-20 PROCEDURE — 87086 URINE CULTURE/COLONY COUNT: CPT

## 2019-05-20 PROCEDURE — 93005 ELECTROCARDIOGRAM TRACING: CPT

## 2019-05-20 PROCEDURE — 80053 COMPREHEN METABOLIC PANEL: CPT

## 2019-05-20 PROCEDURE — 93010 ELECTROCARDIOGRAM REPORT: CPT

## 2019-05-20 PROCEDURE — 99285 EMERGENCY DEPT VISIT HI MDM: CPT | Mod: 25

## 2019-05-20 PROCEDURE — 90792 PSYCH DIAG EVAL W/MED SRVCS: CPT

## 2019-05-20 PROCEDURE — 99283 EMERGENCY DEPT VISIT LOW MDM: CPT | Mod: 25

## 2019-05-20 PROCEDURE — 71046 X-RAY EXAM CHEST 2 VIEWS: CPT | Mod: 26

## 2019-05-20 PROCEDURE — 80307 DRUG TEST PRSMV CHEM ANLYZR: CPT

## 2019-05-20 PROCEDURE — 71046 X-RAY EXAM CHEST 2 VIEWS: CPT

## 2019-05-20 PROCEDURE — 85025 COMPLETE CBC W/AUTO DIFF WBC: CPT

## 2019-05-20 PROCEDURE — 96374 THER/PROPH/DIAG INJ IV PUSH: CPT

## 2019-05-20 RX ORDER — SODIUM CHLORIDE 9 MG/ML
1000 INJECTION INTRAMUSCULAR; INTRAVENOUS; SUBCUTANEOUS ONCE
Refills: 0 | Status: COMPLETED | OUTPATIENT
Start: 2019-05-20 | End: 2019-05-20

## 2019-05-20 RX ORDER — SODIUM CHLORIDE 9 MG/ML
2000 INJECTION INTRAMUSCULAR; INTRAVENOUS; SUBCUTANEOUS ONCE
Refills: 0 | Status: COMPLETED | OUTPATIENT
Start: 2019-05-20 | End: 2019-05-20

## 2019-05-20 RX ORDER — ONDANSETRON 8 MG/1
4 TABLET, FILM COATED ORAL ONCE
Refills: 0 | Status: COMPLETED | OUTPATIENT
Start: 2019-05-20 | End: 2019-05-20

## 2019-05-20 RX ADMIN — SODIUM CHLORIDE 1000 MILLILITER(S): 9 INJECTION INTRAMUSCULAR; INTRAVENOUS; SUBCUTANEOUS at 15:30

## 2019-05-20 RX ADMIN — SODIUM CHLORIDE 2000 MILLILITER(S): 9 INJECTION INTRAMUSCULAR; INTRAVENOUS; SUBCUTANEOUS at 12:40

## 2019-05-20 RX ADMIN — SODIUM CHLORIDE 2000 MILLILITER(S): 9 INJECTION INTRAMUSCULAR; INTRAVENOUS; SUBCUTANEOUS at 13:53

## 2019-05-20 RX ADMIN — ONDANSETRON 4 MILLIGRAM(S): 8 TABLET, FILM COATED ORAL at 13:50

## 2019-05-20 NOTE — ED BEHAVIORAL HEALTH ASSESSMENT NOTE - CURRENT MEDICATION
Reportedly:  Abilify 10mg daily; Trazodone 100mg qhs; Gabapentin 100mg TID; Mirtazapine 15mg qhs; Quetiapine unknown dose; Baclofen unknown dose; PrEP HIV prophylaxis med daily because of a dirty needle stick.   Per chart from Oct 2018: "zyprexa 10mg daily, lamictal 100mg BID, Strattera 50mg daily, and atarax 50mg daily. States he is supposed to take diltiazem for HTN but is not currently."    per EM patient reported he was supposed to be taking olanzapine, clonidine, gabapentin but stopped either 4 days or 4 weeks ago.

## 2019-05-20 NOTE — ED BEHAVIORAL HEALTH ASSESSMENT NOTE - HPI (INCLUDE ILLNESS QUALITY, SEVERITY, DURATION, TIMING, CONTEXT, MODIFYING FACTORS, ASSOCIATED SIGNS AND SYMPTOMS)
Patient is a 35yo Yakut M, single, non-caregiver, [facility], unemployed who is BIB self to [facility] ED with vague suicidal ideation without a plan or intent. Pt is known to malinger for a place to stay.     Today, patient presented for suicidal ideation and anxiety after 6+ weeks of non-compliace with his psychotropic medications, which include Aripiprazole 10mg daily; Trazodone 100mg qhs; Mirtazapine 15mg qhs; Gabapentin 100mg TID; and an unknown quantity of Quetiapine and Baclofen, reportedly. He does not consent to speaking with personal collateral, but reports good relations with parents and sister. Mother: Arnel Do  at [phone]. He also gives sister's phone: [phone] (Angelika) but refuses consent to speak with her at this time. He does not have symptoms of withdrawal and is at his psychiatric baseline. Pt demonstrating extensive future-oriented thinking. If anything he seems more intoxicated. BAL <10. UTOX positive for benzos, weed, and opiates. Requesting zofran and reporting total body pain. Would like to be admitted to psychiatric hospital, here or anywhere else, as he needs a place to stay.     Per psyckes: Diagnoses: of stimulant use, opioid use, other psychostimulant use, cocaine use, alcohol use - along with unspecified depressive/bipolar/psychosis; multiple psych admissions along with ER visits without admissions.

## 2019-05-20 NOTE — ED ADULT TRIAGE NOTE - CHIEF COMPLAINT QUOTE
pt c.o suicidal ideation since yesterday after drinking and using heroin . pt states last drug/alcohol use was yesterday. pt also states that he is hearing voices telling him to " cigarettes." denies homicidal ideation, v/t hallucinations.

## 2019-05-20 NOTE — ED BEHAVIORAL HEALTH ASSESSMENT NOTE - DESCRIPTION
per chart from Oct 2018: history of seizures, HTN - not on meds for these When given referrals to outpatient clinics, he says he can't make it to an outpatient clinic but gives no logical reason for that. During his last ED visit I obtained an appointment for him at the [facility] but he had left before I could give it to him. Spoke with his [facility] providers from last admission here and they feel he is a malingerer who gets very little from inpatient admission. Pashto American, sometimes relies on mother in [place] but not allowing me to call her, unemployed

## 2019-05-20 NOTE — ED PROVIDER NOTE - CLINICAL SUMMARY MEDICAL DECISION MAKING FREE TEXT BOX
34M with concern for tachycardia, elevated WBC count, unclear cause pt denies cough sob abd pain nausea, vomiting rash or any dysuria, given IV fluids and no e/o sepsis based on lactate no indication for bacterial infection requiring abx. Pt's tachycardia resolved with fluids. Pt evaluated by psychiatry, deemed clear for discharge, given tachycardia resolved no infectious s/s will dc home to f/u with pmd no indication for abx or further tx. CXR neg acute.

## 2019-05-20 NOTE — ED PROVIDER NOTE - PHYSICAL EXAMINATION
gen: no acute distress, comfortable, conversant  HEENT: oropharynx clear  Neck: supple, no meningismus, no bruit noted bl carotid  CV: rrr no m/r/g 2+ radial pulse  Resp: ctab, no w/c/r  Abd: nontender, no rebound/guarding  Ext: no edema  Neuro: alert and oriented, moves all  4 ext with equal strength  psych: appears calm

## 2019-05-20 NOTE — ED ADULT NURSE NOTE - CAS ELECT INFOMATION PROVIDED
BRIEF DISCHARGE NOTE:    Reason for hospitalization -  Cataract surgery     Final Diagnosis - Visually significant Cataract    Procedures and treatment provided - Status post phacoemulsification with placement of intraocular lens     Diet - Advance to regular as tolerated    Activity - as tolerated    Disposition at the end of the case - Good.    Discharge: to home    The patient tolerated the procedure well and knows to follow up with me tomorrow morning in the eye clinic, sooner if needed.    Patient and family instructions (as appropriate) - Given to patient on discharge    Isabela Rosales MD     DC instructions

## 2019-05-20 NOTE — ED BEHAVIORAL HEALTH ASSESSMENT NOTE - RISK ASSESSMENT
Risk factors include current suicidal ideation, substance intoxication, poor social structure and non-compliance with treatment.   Protective factors include +future-oriented thinking, known hx of malingering, lack of suicidal plan or intent

## 2019-05-20 NOTE — ED PROVIDER NOTE - CARE PLAN
Principal Discharge DX:	Chronic drug abuse  Secondary Diagnosis:	Bipolar affective disorder, remission status unspecified

## 2019-05-20 NOTE — ED BEHAVIORAL HEALTH ASSESSMENT NOTE - DIFFERENTIAL
substance-induced mood d/o vs bipolar 1 disorder vs. adjustment d/o unspecified  Opiate use disorder  Sedative-hypnotic use disorder

## 2019-05-20 NOTE — ED BEHAVIORAL HEALTH ASSESSMENT NOTE - AXIS IV
Problems with primary support/Housing problems/Occupational problems/Economic problems/Problem related to social environment

## 2019-05-20 NOTE — ED PROVIDER NOTE - OBJECTIVE STATEMENT
34M with hx of drug abuse, states he recently took heroin and marijuana presenting with suicidal ideation, pt has had similar complaint of SI/depression before no clear plan, pt states that he has been self medicating because he has been off his psychiatric medications like Abilify.

## 2019-05-20 NOTE — ED ADULT NURSE NOTE - OBJECTIVE STATEMENT
Patient presents to the ED complaining of suicidal ideation. Patient reports alcohol and cocaine use. Placed on a 1:1. Belongings secured.

## 2019-05-20 NOTE — ED BEHAVIORAL HEALTH ASSESSMENT NOTE - DETAILS
charted history of DT/seizures "restless legs" on Risperdal- had to go to ED for cogentin IM. Reports past attempts. See HPI for most recent attempt. Also states over 2 years ago "took Tylenol" but no medical treatment, reports "heroin overdose" and "trying to hang self" in Jan 2017 Fatigue & total body pain nausea Muscle aches; mild stiffness in joints self

## 2019-05-20 NOTE — ED BEHAVIORAL HEALTH ASSESSMENT NOTE - DESCRIPTION (FIRST USE, LAST USE, QUANTITY, FREQUENCY, DURATION)
Heavy pattern of alcohol, heroin snorting, crack/cocaine use, recreational xanax, marijuana abuse since adolescence. Daily recreational Last week - crack/cocaine Street methadone and heroin insufflation over past 2 days (last bag of heroin ~8am today) Took ~10 x 2 mg "Xanax bars" over past 2 days in addition to ~400mg Librium over this time (last at 11pm last night) hx of K2 abuse, caffeine abuse (liters of coffee plus red bulls as cocaine substitute per chart) several cigarettes/day

## 2019-05-20 NOTE — ED BEHAVIORAL HEALTH ASSESSMENT NOTE - OTHER PAST PSYCHIATRIC HISTORY (INCLUDE DETAILS REGARDING ONSET, COURSE OF ILLNESS, INPATIENT/OUTPATIENT TREATMENT)
Patient reports that he is non-compliant with treatment with Psych NP Mr. Zapata (cannot remember first name).  States his pharmacy is [facility] in [place], [place] (delivers meds to his mother).

## 2019-05-20 NOTE — ED BEHAVIORAL HEALTH ASSESSMENT NOTE - REFERRAL / APPOINTMENT DETAILS
Gave pt appointment for [facility] psych clinic tomorrow at 8:30 am, [facility] [id number], [phone]

## 2019-05-21 LAB
CULTURE RESULTS: SIGNIFICANT CHANGE UP
SPECIMEN SOURCE: SIGNIFICANT CHANGE UP

## 2019-06-01 ENCOUNTER — EMERGENCY (EMERGENCY)
Facility: HOSPITAL | Age: 34
LOS: 1 days | Discharge: HOME | End: 2019-06-01
Attending: EMERGENCY MEDICINE | Admitting: EMERGENCY MEDICINE
Payer: MEDICAID

## 2019-06-01 VITALS
DIASTOLIC BLOOD PRESSURE: 89 MMHG | RESPIRATION RATE: 18 BRPM | SYSTOLIC BLOOD PRESSURE: 131 MMHG | TEMPERATURE: 97 F | OXYGEN SATURATION: 98 % | HEART RATE: 88 BPM

## 2019-06-01 VITALS
HEIGHT: 63 IN | HEART RATE: 74 BPM | WEIGHT: 169.98 LBS | RESPIRATION RATE: 18 BRPM | DIASTOLIC BLOOD PRESSURE: 91 MMHG | SYSTOLIC BLOOD PRESSURE: 145 MMHG | OXYGEN SATURATION: 95 % | TEMPERATURE: 98 F

## 2019-06-01 DIAGNOSIS — Z79.899 OTHER LONG TERM (CURRENT) DRUG THERAPY: ICD-10-CM

## 2019-06-01 DIAGNOSIS — F11.23 OPIOID DEPENDENCE WITH WITHDRAWAL: ICD-10-CM

## 2019-06-01 PROCEDURE — 99283 EMERGENCY DEPT VISIT LOW MDM: CPT

## 2019-06-01 RX ORDER — ONDANSETRON 8 MG/1
1 TABLET, FILM COATED ORAL
Qty: 16 | Refills: 0
Start: 2019-06-01 | End: 2019-06-04

## 2019-06-01 RX ORDER — ONDANSETRON 8 MG/1
4 TABLET, FILM COATED ORAL ONCE
Refills: 0 | Status: COMPLETED | OUTPATIENT
Start: 2019-06-01 | End: 2019-06-01

## 2019-06-01 RX ADMIN — ONDANSETRON 4 MILLIGRAM(S): 8 TABLET, FILM COATED ORAL at 14:28

## 2019-06-01 NOTE — ED PROVIDER NOTE - OBJECTIVE STATEMENT
33yo M hx of polysubstance abuse, here for complaint of "I feel jittery" in setting of heavy heroin and marijuana use. last used earlier today. feels like he has to pace the room so came in to get his BP checked. nauseous as well, no vomiting or diarrhea. interested in rehab/detox options. no chest pain, no fevers, no rash or cellulitis.

## 2019-06-01 NOTE — ED ADULT TRIAGE NOTE - OTHER COMPLAINTS
patient appears manic in triage. denies SI/ HI. patient cooperative. reports he has not been using his Suboxone, reports last took heroin last night at 3 am. denies any other drug or alcohol use.

## 2019-06-01 NOTE — ED ADULT NURSE NOTE - OBJECTIVE STATEMENT
Pt. states "I left my rehab program a month ago and I stopped taking gabapentin 300 mg two weeks ago, so now I have been having stomach pain. I am also hypertensive because I smoke cigarettes" Pt. c/o constant generalized abd pain w/ nausea and 3-4x diarrhea/day x one week. Denies blood in stool. Pt. states he smokes 15 cigarettes/day, uses heroin daily, denies other substance use. Denies any SI/HI, calm cooperative at this time, asking for gabapentin Rx.

## 2019-06-01 NOTE — ED ADULT TRIAGE NOTE - CHIEF COMPLAINT QUOTE
"I have hypertension and stomach pain - its probably from the heroin I used last night. Zofran usually helps."

## 2019-06-01 NOTE — ED PROVIDER NOTE - NSFOLLOWUPINSTRUCTIONS_ED_ALL_ED_FT
Opioid Withdrawal  Opioids are powerful substances that relieve pain. Opioids include illegal drugs, such as heroin, as well as prescription pain medicines, such as codeine, morphine, hydrocodone, oxycodone, and fentanyl. Opioid withdrawal is a group of symptoms that can happen if you have been taking opioids for a long time and suddenly stop.    What are the causes?  This condition is caused by taking opioids for weeks and then doing any of the following:  Stopping use.  Rapidly reducing use.  Taking a medicine to block their effect.  What increases the risk?  This condition is more likely to develop in:  People who take opioids incorrectly.  People who take opioids for a long period of time.  What are the signs or symptoms?  Symptoms of this condition can be physical or mental. Physical symptoms include:  Nausea and vomiting.  Muscle aches or spasms.  Watery eyes and runny nose.  Widening of the dark centers of the eyes (dilated pupils).  Hair standing on end.  Fever and sweating.  Intestinal cramping and diarrhea.  Increased blood pressure and fast pulse.  Mental symptoms include:  Depression.  Anxiety.  Restlessness and irritability.  Trouble sleeping.  When symptoms start and how long they last depends on if you have been taking an opioid that works fast and then loses its effect quickly (short acting-opioid), an opioid that works for a longer period of time (long-acting opioid), or a drug that blocks the effects of opioids.  If you have been taking a short-acting opioid, such as heroin and oxycodone, symptoms occur within hours of stopping or reducing the amount you take. The worst symptoms (peak withdrawal) occur in 24–48 hours. Symptoms should subside in 3–5 days.  If you have been taking a long-acting opioid, such as methadone, symptoms can occur within 30 hours of stopping or reducing the amount you take and can continue for up to 10 days.  If you are taking a drug that blocks the effects of opioids, such as naltrexone or naloxone, symptoms begin within minutes.  How is this diagnosed?  This condition is diagnosed based on:  Your symptoms.  Your medical history.  Your history of drug and alcohol use.  Which medicines you have been taking.  Your health care provider may:  Perform a physical exam.  Order tests.  Ask that you see a mental health professional.  How is this treated?  ImageTreatment for this condition is usually provided by mental health professionals with training in substance use disorders (addiction specialists). Treatment may involve:  Counseling. This treatment is also called talk therapy. It is provided by substance use treatment counselors.  Support groups. Support groups are run by people who have quit using opioids. They provide emotional support, advice, and guidance.  Medicine. Some medicines can help to lessen certain withdrawal symptoms. Sometimes an opioid is prescribed to replace the opioid that you have been taking. You may be asked to take less and less of this opioid over time to lessen or prevent withdrawal symptoms.  Follow these instructions at home:  Take over-the-counter and prescription medicines only as told by your health care provider.  Check with your health care provider before starting any new medicines.  Keep all follow-up visits as told by your health care provider. This is important.  Contact a health care provider if:  You are not able to take your medicines as told.  Your symptoms get worse.  You take an opioid after stopping use, or you take more of an opioid than you have been.  Get help right away if:  You have a seizure.  You lose consciousness.  You have serious thoughts about hurting yourself or others.  If you ever feel like you may hurt yourself or others, or have thoughts about taking your own life, get help right away. You can go to your nearest emergency department or call:  Your local emergency services (911 in the [place]  A suicide crisis helpline, such as the National Suicide Prevention Lifeline at 1-176.117.7318. This is open 24 hours a day.

## 2019-06-01 NOTE — ED PROVIDER NOTE - PHYSICAL EXAMINATION
CONSTITUTIONAL: Well-appearing; well-nourished; in no apparent distress.   HEAD: Normocephalic; atraumatic.   EYES:  conjunctiva and sclera clear  ENT: normal nose; no rhinorrhea; normal pharynx with no erythema or lesions.   NECK: Supple; non-tender;   CARDIOVASCULAR: Normal S1, S2; no murmurs, rubs, or gallops. Regular rhythm w/ mild tachycardia  RESPIRATORY: Breathing easily; breath sounds clear and equal bilaterally; no wheezes, rhonchi, or rales.  GI: Soft; non-distended; non-tender; no palpable organomegaly.   EXT: No cyanosis or edema; N/V intact  SKIN: Normal for age and race; warm; dry; good turgor; no apparent lesions or rash.   NEURO: A & O x 3; face symmetric; grossly unremarkable.   PSYCHOLOGICAL: The patient’s mood and manner are appropriate.

## 2019-06-01 NOTE — ED ADULT NURSE NOTE - CAS ELECT INFOMATION PROVIDED
Pt. given education materials on opioid withdrawal. reports abd pain relief after zofran PO. given metrocard. ambulating independently w/ steady gait./DC instructions

## 2019-06-01 NOTE — ED PROVIDER NOTE - CLINICAL SUMMARY MEDICAL DECISION MAKING FREE TEXT BOX
here w/ symptoms likely 2/2 to both opiate w/d and active marijuana use. zofran given and pt felt better, rx sent. list of rehabs provided. DC home in NAD with strict return precautions given.

## 2019-06-27 ENCOUNTER — EMERGENCY (EMERGENCY)
Facility: HOSPITAL | Age: 34
LOS: 1 days | Discharge: HOME | End: 2019-06-27
Attending: EMERGENCY MEDICINE | Admitting: EMERGENCY MEDICINE
Payer: MEDICAID

## 2019-06-27 VITALS
HEART RATE: 106 BPM | TEMPERATURE: 98 F | RESPIRATION RATE: 15 BRPM | DIASTOLIC BLOOD PRESSURE: 66 MMHG | OXYGEN SATURATION: 95 % | SYSTOLIC BLOOD PRESSURE: 100 MMHG

## 2019-06-27 DIAGNOSIS — R41.82 ALTERED MENTAL STATUS, UNSPECIFIED: ICD-10-CM

## 2019-06-27 DIAGNOSIS — Z79.899 OTHER LONG TERM (CURRENT) DRUG THERAPY: ICD-10-CM

## 2019-06-27 DIAGNOSIS — F19.10 OTHER PSYCHOACTIVE SUBSTANCE ABUSE, UNCOMPLICATED: ICD-10-CM

## 2019-06-27 PROCEDURE — 99283 EMERGENCY DEPT VISIT LOW MDM: CPT

## 2019-06-27 NOTE — ED PROVIDER NOTE - CARE PLAN
Principal Discharge DX:	Altered mental status  Secondary Diagnosis:	Polysubstance abuse  Secondary Diagnosis:	Chronic drug abuse

## 2019-06-27 NOTE — ED PROVIDER NOTE - CLINICAL SUMMARY MEDICAL DECISION MAKING FREE TEXT BOX
33 y/o Male with PMHx of ADD, Bipolar disorder, chronic drug abuse, HTN, and seizures, takes Gabapentin for pain, lives in homeless shelter, BIB EMS  for AMS. Pt says "does drugs all days, everything" because he is "trying to have fun". Notes he did 14 days of rehab.  Rest of HPI is limited due to pt's altered status.

## 2019-06-27 NOTE — ED PROVIDER NOTE - OBJECTIVE STATEMENT
35 y/o Male with PMHx of ADD, Bipolar disorder, chronic drug abuse, HTN, and seizures, takes Gabapentin for pain, lives in homeless shelter, BIB EMS  for AMS. Pt says "does drugs all days, everything" because he is "trying to have fun". Notes he did 14 days of rehab.  Rest of HPI is limited due to pt's altered status.

## 2019-06-27 NOTE — ED ADULT TRIAGE NOTE - CHIEF COMPLAINT QUOTE
Pt brought in by EMS after pt was noted to be asleep on the street by a bystander. Pt admits to taking heroin, cocaine, gabapentin,

## 2019-06-27 NOTE — ED ADULT TRIAGE NOTE - ESI TRIAGE ACUITY LEVEL, MLM
A Rx for ferrous sulfate 3 mg/kg was left on [facility] berto  Results of sleep study discussed with father    Cinthya Rivers MD    Pediatric Department  Division of Pediatric Pulmonology and Sleep Medicine  Pager # [phone]  Email: [email or web address]    
2

## 2019-06-28 VITALS
SYSTOLIC BLOOD PRESSURE: 105 MMHG | HEART RATE: 97 BPM | RESPIRATION RATE: 16 BRPM | TEMPERATURE: 98 F | DIASTOLIC BLOOD PRESSURE: 62 MMHG | OXYGEN SATURATION: 99 %

## 2019-07-09 NOTE — ED ADULT TRIAGE NOTE - NSWEIGHTCALCTOOLDRUG_GEN_A_CORE
Problem: Potential for Falls  Goal: Patient will remain free of falls  Description  INTERVENTIONS:  - Assess patient frequently for physical needs  -  Identify cognitive and physical deficits and behaviors that affect risk of falls    -  [place] fall precautions as indicated by assessment   - Educate patient/family on patient safety including physical limitations  - Instruct patient to call for assistance with activity based on assessment  - Modify environment to reduce risk of injury  - Consider OT/PT consult to assist with strengthening/mobility  Outcome: Progressing     Problem: PAIN - ADULT  Goal: Verbalizes/displays adequate comfort level or baseline comfort level  Description  Interventions:  - Encourage patient to monitor pain and request assistance  - Assess pain using appropriate pain scale  - Administer analgesics based on type and severity of pain and evaluate response  - Implement non-pharmacological measures as appropriate and evaluate response  - Consider cultural and social influences on pain and pain management  - Notify physician/advanced practitioner if interventions unsuccessful or patient reports new pain  Outcome: Progressing     Problem: INFECTION - ADULT  Goal: Absence or prevention of progression during hospitalization  Description  INTERVENTIONS:  - Assess and monitor for signs and symptoms of infection  - Monitor lab/diagnostic results  - Monitor all insertion sites, i e  IV  - [place] appropriate cooling/warming therapies per order  - Administer medications as ordered  - Instruct and encourage patient and family to use good hand hygiene technique  - Identify and instruct in appropriate isolation precautions for identified infection/condition   Outcome: Progressing     Problem: SAFETY ADULT  Goal: Patient will remain free of falls  Description  INTERVENTIONS:  - Assess patient frequently for physical needs  -  Identify cognitive and physical deficits and behaviors that affect risk of falls   -  [place] fall precautions as indicated by assessment   - Educate patient/family on patient safety including physical limitations  - Instruct patient to call for assistance with activity based on assessment  - Modify environment to reduce risk of injury  - Consider OT/PT consult to assist with strengthening/mobility  Outcome: Progressing  Goal: Maintain or return to baseline ADL function  Description  INTERVENTIONS:  -  Assess patient's ability to carry out ADLs; assess patient's baseline for ADL function and identify physical deficits which impact ability to perform ADLs (bathing, care of mouth/teeth, toileting, grooming, dressing, etc )  - Assess/evaluate cause of self-care deficits   - Assess range of motion  - Assess patient's mobility; develop plan if impaired  - Assess patient's need for assistive devices and provide as appropriate  - Encourage maximum independence but intervene and supervise when necessary  ¯ Involve family in performance of ADLs  ¯ Assess for home care needs following discharge   ¯ Request OT consult to assist with ADL evaluation and planning for discharge  ¯ Provide patient education as appropriate  Outcome: Progressing  Goal: Maintain or return mobility status to optimal level  Description  INTERVENTIONS:  - Assess patient's baseline mobility status (ambulation, transfers, stairs, etc )    - Identify cognitive and physical deficits and behaviors that affect mobility  - Identify mobility aids required to assist with transfers and/or ambulation (gait belt, sit-to-stand, lift, walker, cane, etc )  - [place] fall precautions as indicated by assessment  - Record patient progress and toleration of activity level on Mobility SBAR; progress patient to next Phase/Stage  - Instruct patient to call for assistance with activity based on assessment  - Request Rehabilitation consult to assist with strengthening/weightbearing, etc   Outcome: Progressing     Problem: DISCHARGE PLANNING  Goal: Discharge to home or other facility with appropriate resources  Description  INTERVENTIONS:  - Identify barriers to discharge w/patient and caregiver  - Arrange for needed discharge resources and transportation as appropriate  - Identify discharge learning needs (meds, wound care, etc )  - Arrange for interpretive services to assist at discharge as needed  - Refer to Case Management Department for coordinating discharge planning if the patient needs post-hospital services based on physician/advanced practitioner order or complex needs related to functional status, cognitive ability, or social support system  Outcome: Progressing     Problem: Knowledge Deficit  Goal: Patient/family/caregiver demonstrates understanding of disease process, treatment plan, medications, and discharge instructions  Description  Complete learning assessment and assess knowledge base  Interventions:  - Provide teaching at level of understanding  - Provide teaching via preferred learning methods  Outcome: Progressing     Problem: CARDIOVASCULAR - ADULT  Goal: Maintains optimal cardiac output and hemodynamic stability  Description  INTERVENTIONS:  - Monitor I/O, vital signs and rhythm  - Monitor for S/S and trends of decreased cardiac output i e  bleeding, hypotension  - Administer and titrate ordered vasoactive medications to optimize hemodynamic stability  - Assess quality of pulses, skin color and temperature  - Assess for signs of decreased coronary artery perfusion - ex   Angina  - Instruct patient to report change in severity of symptoms  Outcome: Progressing  Goal: Absence of cardiac dysrhythmias or at baseline rhythm  Description  INTERVENTIONS:  - Continuous cardiac monitoring, monitor vital signs, obtain 12 lead EKG if indicated  - Administer antiarrhythmic and heart rate control medications as ordered  - Monitor electrolytes and administer replacement therapy as ordered  Outcome: Progressing     Problem: RESPIRATORY - ADULT  Goal: Achieves optimal ventilation and oxygenation  Description  INTERVENTIONS:  - Assess for changes in respiratory status  - Assess for changes in mentation and behavior  - Position to facilitate oxygenation and minimize respiratory effort  - Oxygen administration by appropriate delivery method based on oxygen saturation (per order) or ABGs  - Encourage broncho-pulmonary hygiene including cough, deep breathe, Incentive Spirometry  - Assess the need for suctioning and aspirate as needed  - Assess and instruct to report SOB or any respiratory difficulty  - Respiratory Therapy support as indicated   Outcome: Progressing     Problem: Nutrition/Hydration-ADULT  Goal: Nutrient/Hydration intake appropriate for improving, restoring or maintaining nutritional needs  Description  Monitor and assess patient's nutrition/hydration status for malnutrition (ex- brittle hair, bruises, dry skin, pale skin and conjunctiva, muscle wasting, smooth red tongue, and disorientation)  Collaborate with interdisciplinary team and initiate plan and interventions as ordered  Monitor patient's weight and dietary intake as ordered or per policy  Utilize nutrition screening tool and intervene per policy  Determine patient's food preferences and provide high-protein, high-caloric foods as appropriate       INTERVENTIONS:  - Monitor oral intake, urinary output, labs, and treatment plans  - Assess nutrition and hydration status and recommend course of action  - Evaluate amount of meals eaten  - Assist patient with eating if necessary   - Allow adequate time for meals  - Recommend/ encourage appropriate diets, oral nutritional supplements, and vitamin/mineral supplements  - Order, calculate, and assess calorie counts as needed  - Assess need for intravenous fluids  - Provide specific nutrition/hydration education as appropriate  - Include patient/family/caregiver in decisions related to nutrition   Outcome: Progressing  used

## 2019-07-18 NOTE — ED PROVIDER NOTE - PRO INTERPRETER NEED 2
Cardiology Office Visit 7/18/2019    Chief Complaint/Reason for Visit:   Chief Complaint   Patient presents with   • Consultation       HISTORY OF PRESENT ILLNESS:     Red Ga is a 28 year old male who presents for a consultation, cardiovascular clearance for kidney transplant, and cardiovascular evaluation.    This patient has a history of end stage renal disease and HTN. He has no known allergies. This patient has a FHx of DM (mother) and HTN (mother, brother, sister), CVA (grandmother). He is a former  and has not worked in 1.5 years. He was recently switched to peritoneal dialysis. The patient does not smoke himself but was exposed to second hand smoke as a . He formerly drank but now only socially.     The most recent labs from 2/25/2019 were reviewed and discussed with the patient. Cholesterol was 130, LDL was 56, HDL was 59, triglycerides were 74.    An echocardiogram was completed on 2/25/2019 that showed an EF of 60-65%. An US of the pelvis was also completed on 2/25/2019 that showed peak systolic velocities between 119-139 cm/s. A Lexiscan Cardiolite Stress test was also completed that showed normal LV function.     The patient's blood pressure was 124/70, HR was 80, and weight was 230 lb today in office.     Patient has no other complaints. No chest pain, palpitations, shortness of breath, PND, syncope, presyncope, dizziness, or orthopnea. No leg edema. There is no overt evidence for ischemia, heart failure, or VT.    REVIEW OF SYSTEMS:  Review of Systems   Constitution: Negative.   HENT: Negative.    Eyes: Negative.    Cardiovascular: Negative for chest pain, dyspnea on exertion, irregular heartbeat, leg swelling, orthopnea, palpitations, paroxysmal nocturnal dyspnea and syncope.   Respiratory: Negative.  Negative for cough and shortness of breath.    Endocrine: Negative.    Hematologic/Lymphatic: Negative.    Skin: Negative.    Musculoskeletal: Negative.     Gastrointestinal: Negative.    Genitourinary: Negative.    Neurological: Negative.    Psychiatric/Behavioral: Negative.    Allergic/Immunologic: Negative.        PAST MEDICAL HISTORY:   Past Medical History:   Diagnosis Date   • Anemia    • Chronic kidney disease    • Essential (primary) hypertension        PAST SURGICAL HISTORY:   Past Surgical History:   Procedure Laterality Date   • Peritoneal catheter insertion  05/2018   • Pr hemodialysis via catheter  12/2018       FAMILY HISTORY:   Family History   Problem Relation Age of Onset   • Diabetes Mother    • Hypertension Mother    • Hypertension Brother        SOCIAL HISTORY:   Social History     Tobacco Use   • Smoking status: Never Smoker   • Smokeless tobacco: Never Used   Substance Use Topics   • Alcohol use: Yes     Comment: Occasionally   • Drug use: No       Drug Use:    No                ALLERGIES:   ALLERGIES:  No Known Allergies    MEDICATIONS:   Current Outpatient Medications   Medication Sig Dispense Refill   • carvedilol (COREG) 6.25 MG tablet Take 6.25 mg by mouth 2 times daily (with meals).     • amLODIPine (NORVASC) 5 MG tablet Take 5 mg by mouth daily.     • calcitRIOL (ROCALTROL) 0.5 MCG capsule Take 0.5 mcg by mouth daily.     • sevelamer (RENAGEL) 800 MG tablet Take 800 mg by mouth 3 times daily (with meals).     • Ergocalciferol (VITAMIN D2 PO)        No current facility-administered medications for this visit.        PHYSICAL EXAMINATION  Visit Vitals  /70   Pulse 80   Ht 5' 7\" (1.702 m)   Wt 104.3 kg (230 lb)   BMI 36.02 kg/m²       Physical Exam   Constitutional: He appears well-developed and well-nourished.   HENT:   Head: Normocephalic.   Eyes: Pupils are equal, round, and reactive to light. Conjunctivae and EOM are normal.   Neck: Normal range of motion. Neck supple.   Cardiovascular: Normal rate, regular rhythm, normal heart sounds and intact distal pulses. Exam reveals no gallop and no friction rub.   No murmur  heard.  Pulmonary/Chest: Effort normal and breath sounds normal.   Abdominal: Soft. Bowel sounds are normal. He exhibits no mass. There is no tenderness.   Musculoskeletal: Normal range of motion. He exhibits no edema or tenderness.   Skin: There is pallor.     Labs:      CHOLESTEROL   Date Value   02/25/2019 130 mg/dL   02/25/2019 Desirable            <200   02/25/2019 Borderline High      200 to 239   02/25/2019 High                 >=240     HDL   Date Value   02/25/2019 59 mg/dL   02/25/2019 Low            <40   02/25/2019 Borderline Low 40 to 49   02/25/2019 Near Optimal   50 to 59   02/25/2019 Optimal        >=60     TRIGLYCERIDE   Date Value   02/25/2019 74 mg/dL   02/25/2019 Normal                   <150   02/25/2019 Borderline High          150 to 199   02/25/2019 High                     200 to 499   02/25/2019 Very High                >=500     CALCULATED LDL   Date Value   02/25/2019 BORDERLINE HIGH       130-159   02/25/2019 HIGH                  160-189   02/25/2019 VERY HIGH             >=190   02/25/2019 56 mg/dL   02/25/2019 OPTIMAL               <100   02/25/2019 NEAR OPTIMAL          100-129       TSH (mcUnits/mL)   Date Value   02/06/2019 0.472       INR (no units)   Date Value   02/06/2019 0.9   02/06/2019     INR Therapeutic Range: 2.0 to 3.0 (2.5 to 3.5 recommended for recurrent thrombotic episodes and mechanical prosthetic heart valves.)       IMPRESSION/PLAN:    Red was seen today for consultation.    Diagnoses and all orders for this visit:    Hypertension, unspecified type    End-stage renal disease (CMS/HCC)        1. Patient's medications and most recent lab work was reviewed with patient and family.   2. Continue current management.  3. Discussed risk factor intervention.   4. Weight loss, dietary modifications, and regular exercise were discussed and encouraged.    5. This patient has cardiovascular clearance for kidney transplant with a moderately increased cardiovascular risk.    6. Future Stress Test, Echocardiogram, and Blood tests will be helpful for follow-up care.   7. Patient instructed to call the office if any changes occur.     Close clinical follow up is recommended.    Scribe Attestation: Entered by Siobhan Gilligan, acting as scribe for Dr. Hill Han    Provider Attestation: The documentation recorded by the scribe accurately reflects the service I personally performed and the decisions made by me, MD Hill Dhillon MD  Cardiology   English

## 2019-08-19 ENCOUNTER — EMERGENCY (EMERGENCY)
Facility: HOSPITAL | Age: 34
LOS: 1 days | Discharge: HOME | End: 2019-08-19
Attending: EMERGENCY MEDICINE | Admitting: EMERGENCY MEDICINE
Payer: MEDICAID

## 2019-08-19 VITALS
OXYGEN SATURATION: 97 % | HEIGHT: 68 IN | TEMPERATURE: 97 F | SYSTOLIC BLOOD PRESSURE: 130 MMHG | WEIGHT: 169.98 LBS | DIASTOLIC BLOOD PRESSURE: 74 MMHG | RESPIRATION RATE: 18 BRPM | HEART RATE: 86 BPM

## 2019-08-19 PROCEDURE — 99283 EMERGENCY DEPT VISIT LOW MDM: CPT

## 2019-08-19 RX ORDER — ALPRAZOLAM 0.25 MG
0.5 TABLET ORAL ONCE
Refills: 0 | Status: DISCONTINUED | OUTPATIENT
Start: 2019-08-19 | End: 2019-08-19

## 2019-08-19 RX ADMIN — Medication 0.5 MILLIGRAM(S): at 23:12

## 2019-08-19 NOTE — ED PROVIDER NOTE - NSFOLLOWUPINSTRUCTIONS_ED_ALL_ED_FT
Log Out.    [employer] CareNotes®     :  [facility]             POLYSUBSTANCE ABUSE - AfterCare(R) Instructions(ER/ED)     Polysubstance Abuse    WHAT YOU NEED TO KNOW:    Polysubstance abuse is the abuse of 2 or more drugs that cause impairment or distress. Examples include alcohol, nicotine, marijuana, cocaine, heroin, methamphetamine, hallucinogens such as mushrooms, or inhalants such as paint thinner. Prescribed medicines, such as opioids for pain or benzodiazepines for anxiety, are also commonly abused.    DISCHARGE INSTRUCTIONS:    Call 911 for any of the following:     You feel you might harm yourself or others.         Return to the emergency department if:     You have a seizure.       You have chest pain and your heart is beating faster than usual.       You have new shortness of breath.       You are dizzy and lightheaded.     Contact your healthcare provider or therapist if:     You are using drugs and think you are pregnant.       You have withdrawal symptoms and want to start using drugs again.       You have questions or concerns about your condition or care.     Risks of polysubstance abuse:     Drug dependence is when you continue to use drugs, even when you know the risks. Polysubstance abuse can damage your heart, brain, lungs, liver, and gastrointestinal tract. You continue even when it causes problems with work, school, or relationships. You may have difficulty finding or keeping a job because of your drug dependence.       Drug tolerance is when you need to use more drugs, or use them more often, to get the effects you want. You may not be able to stop using the drugs. When you try to stop, you may have withdrawal symptoms and strong cravings for the drugs.      Drug overdose can occur when you take more drugs than your body can handle. This may be a small amount or a large amount. You can lose consciousness or have a seizure or stroke. Your heart can stop beating, or you can stop breathing. You may die from a drug overdose.     Medicines:     Withdrawal medicines may be given according to the types of drugs you are abusing. Withdrawal from drugs can cause serious, life-threatening side effects. Certain medicines can help decrease your withdrawal symptoms and your desire for the drug. Ask for more information about the withdrawal medicines you may need.       Mood stabilizers may be given to help prevent or treat depression or anxiety caused by drug abuse and withdrawal.       Take your medicine as directed. Contact your healthcare provider if you think your medicine is not helping or if you have side effects. Tell him or her if you are allergic to any medicine. Keep a list of the medicines, vitamins, and herbs you take. Include the amounts, and when and why you take them. Bring the list or the pill bottles to follow-up visits. Carry your medicine list with you in case of an emergency.    Follow up with your healthcare provider as directed: You may be referred to a specialist to treat health conditions caused by your drug use. This includes mental health, heart, or lung specialists. Write down your questions so you remember to ask them during your visits.     Therapy: You may need therapy and support to stop using drugs:     Cognitive and behavioral therapy helps you change your thinking and behavior. It can help you develop plans to avoid the situations that make you want to use drugs. It also helps you cope with the feelings of wanting to use drugs. You may have individual or group therapy.       Contingency management helps you set drug-free goals with a therapist. You will decide ways to celebrate your success when you reach a goal.       Family therapy and support groups allow you and your family members to talk to and be encouraged by other people affected by drug abuse. You and your family members may attend together or separately. Ask your healthcare provider for information about programs in your area.     How polysubstance abuse affects unborn or  babies:     If you are pregnant or get pregnant while using drugs, you may have a miscarriage or give birth early. Your baby may be born addicted to the drugs.      Do not breastfeed your baby if you use drugs. Drugs pass from your bloodstream into your breast milk and affect your baby's health. Talk with your healthcare provider if you are using drugs and breastfeeding.    For support and more information:     Alcoholics Anonymous  Web Address: http://www.aa.org      National Clearinghouse on Drug and Alcohol Information  Phone: 0-504-7202927  Web Address: www.health.org      National [place] on Alcoholism and Drug Dependence  [address] [place], [place] [id number]  Phone: 1-711.208.5938  Phone: 1-998.704.3576  Web Address: http://www.ncadd.org

## 2019-08-19 NOTE — ED PROVIDER NOTE - PHYSICAL EXAMINATION
VITAL SIGNS: I have reviewed nursing notes and confirm.  CONSTITUTIONAL: Well-developed; well-nourished male calm in stretcher speaking clearly in complete sentences; in no acute distress.  SKIN: Skin exam is warm and dry, no acute rash.  HEAD: Normocephalic; atraumatic.  EYES: PERRL, EOM intact; conjunctiva and sclera clear.  ENT: No nasal discharge; airway clear.  NECK: Supple; non tender.  CARD: S1, S2 normal; no murmurs, gallops, or rubs. Regular rate and rhythm.  RESP: No wheezes, rales or rhonchi.  ABD: Normal bowel sounds; soft; non-distended; non-tender  EXT: Normal ROM. No clubbing, cyanosis or edema.  NEURO: Alert, oriented. Grossly unremarkable.  PSYCH: Cooperative, appropriate.

## 2019-08-19 NOTE — ED PROVIDER NOTE - CLINICAL SUMMARY MEDICAL DECISION MAKING FREE TEXT BOX
sx aborted with trial of benzo. HDS and comfortable, no acute psychosis, d/c home w/return precautions.

## 2019-08-19 NOTE — ED PROVIDER NOTE - OBJECTIVE STATEMENT
35 y/o M w/hx polysubstance abuse admits to snorting cocaine today and feeling irritable/out of control. Came to ED in order to calm down. No SI/HI/AH/VH. Denies co-ingestion (despite triage note). No CP, SOB, palpitations, abd pain or n/v. No recent illness or fever.

## 2019-08-19 NOTE — ED ADULT NURSE NOTE - OBJECTIVE STATEMENT
Pt. states "I did cocaine all day today" and c/o feeling anxious, irritable and agitated from 7 am to 9:30 pm. Pt. is calm, cooperative, answering questions appropriately, denies any SI/HI at this time. Pt. denies daily drinking, states he last had 2 beers yesterday. Pt. also states he has been unable to have his daily meds (ability, suboxone, lamictal) x 2 days since he ama'ed from his program. Pt. states he was given naloxone IM at [place] yesterday. Denies CP, SOB, palpitations. Denies abd pain, N&V&D.

## 2019-08-19 NOTE — ED ADULT NURSE NOTE - NSIMPLEMENTINTERV_GEN_ALL_ED
Implemented All Universal Safety Interventions:  [place] to call system. Call bell, personal items and telephone within reach. Instruct patient to call for assistance. Room bathroom lighting operational. Non-slip footwear when patient is off stretcher. Physically safe environment: no spills, clutter or unnecessary equipment. Stretcher in lowest position, wheels locked, appropriate side rails in place.

## 2019-08-20 ENCOUNTER — EMERGENCY (EMERGENCY)
Facility: HOSPITAL | Age: 34
LOS: 1 days | Discharge: HOME | End: 2019-08-20
Attending: EMERGENCY MEDICINE | Admitting: EMERGENCY MEDICINE
Payer: MEDICAID

## 2019-08-20 VITALS
HEIGHT: 65 IN | SYSTOLIC BLOOD PRESSURE: 110 MMHG | HEART RATE: 100 BPM | DIASTOLIC BLOOD PRESSURE: 78 MMHG | OXYGEN SATURATION: 99 % | RESPIRATION RATE: 17 BRPM | WEIGHT: 169.98 LBS | TEMPERATURE: 98 F

## 2019-08-20 VITALS
OXYGEN SATURATION: 98 % | TEMPERATURE: 98 F | DIASTOLIC BLOOD PRESSURE: 79 MMHG | SYSTOLIC BLOOD PRESSURE: 122 MMHG | RESPIRATION RATE: 18 BRPM | HEART RATE: 73 BPM

## 2019-08-20 DIAGNOSIS — R69 ILLNESS, UNSPECIFIED: ICD-10-CM

## 2019-08-20 LAB
ALBUMIN SERPL ELPH-MCNC: 3.5 G/DL — SIGNIFICANT CHANGE UP (ref 3.4–5)
ALP SERPL-CCNC: 65 U/L — SIGNIFICANT CHANGE UP (ref 40–120)
ALT FLD-CCNC: 25 U/L — SIGNIFICANT CHANGE UP (ref 12–42)
ANION GAP SERPL CALC-SCNC: 11 MMOL/L — SIGNIFICANT CHANGE UP (ref 9–16)
APAP SERPL-MCNC: <2 UG/ML — LOW (ref 10–30)
APPEARANCE UR: CLEAR — SIGNIFICANT CHANGE UP
AST SERPL-CCNC: 17 U/L — SIGNIFICANT CHANGE UP (ref 15–37)
BASOPHILS NFR BLD AUTO: 0.7 % — SIGNIFICANT CHANGE UP (ref 0–2)
BILIRUB SERPL-MCNC: 0.2 MG/DL — SIGNIFICANT CHANGE UP (ref 0.2–1.2)
BILIRUB UR-MCNC: ABNORMAL
BUN SERPL-MCNC: 18 MG/DL — SIGNIFICANT CHANGE UP (ref 7–23)
CALCIUM SERPL-MCNC: 9.2 MG/DL — SIGNIFICANT CHANGE UP (ref 8.5–10.5)
CHLORIDE SERPL-SCNC: 109 MMOL/L — HIGH (ref 96–108)
CO2 SERPL-SCNC: 26 MMOL/L — SIGNIFICANT CHANGE UP (ref 22–31)
COLOR SPEC: YELLOW — SIGNIFICANT CHANGE UP
CREAT SERPL-MCNC: 0.73 MG/DL — SIGNIFICANT CHANGE UP (ref 0.5–1.3)
DIFF PNL FLD: NEGATIVE — SIGNIFICANT CHANGE UP
EOSINOPHIL NFR BLD AUTO: 3.5 % — SIGNIFICANT CHANGE UP (ref 0–6)
ETHANOL SERPL-MCNC: 6 MG/DL — HIGH
GLUCOSE SERPL-MCNC: 107 MG/DL — HIGH (ref 70–99)
GLUCOSE UR QL: NEGATIVE — SIGNIFICANT CHANGE UP
HCT VFR BLD CALC: 37 % — LOW (ref 39–50)
HGB BLD-MCNC: 12.2 G/DL — LOW (ref 13–17)
IMM GRANULOCYTES NFR BLD AUTO: 0.6 % — SIGNIFICANT CHANGE UP (ref 0–1.5)
KETONES UR-MCNC: ABNORMAL MG/DL
LEUKOCYTE ESTERASE UR-ACNC: NEGATIVE — SIGNIFICANT CHANGE UP
LYMPHOCYTES # BLD AUTO: 21.9 % — SIGNIFICANT CHANGE UP (ref 13–44)
MCHC RBC-ENTMCNC: 28 PG — SIGNIFICANT CHANGE UP (ref 27–34)
MCHC RBC-ENTMCNC: 33 G/DL — SIGNIFICANT CHANGE UP (ref 32–36)
MCV RBC AUTO: 84.9 FL — SIGNIFICANT CHANGE UP (ref 80–100)
MONOCYTES NFR BLD AUTO: 5.2 % — SIGNIFICANT CHANGE UP (ref 2–14)
NEUTROPHILS NFR BLD AUTO: 68.1 % — SIGNIFICANT CHANGE UP (ref 43–77)
NITRITE UR-MCNC: NEGATIVE — SIGNIFICANT CHANGE UP
PCP SPEC-MCNC: SIGNIFICANT CHANGE UP
PH UR: 5.5 — SIGNIFICANT CHANGE UP (ref 5–8)
PLATELET # BLD AUTO: 447 K/UL — HIGH (ref 150–400)
POTASSIUM SERPL-MCNC: 3.6 MMOL/L — SIGNIFICANT CHANGE UP (ref 3.5–5.3)
POTASSIUM SERPL-SCNC: 3.6 MMOL/L — SIGNIFICANT CHANGE UP (ref 3.5–5.3)
PROT SERPL-MCNC: 7 G/DL — SIGNIFICANT CHANGE UP (ref 6.4–8.2)
PROT UR-MCNC: 30 MG/DL
RBC # BLD: 4.36 M/UL — SIGNIFICANT CHANGE UP (ref 4.2–5.8)
RBC # FLD: 14.9 % — HIGH (ref 10.3–14.5)
SALICYLATES SERPL-MCNC: 0.9 MG/DL — LOW (ref 2.8–20)
SODIUM SERPL-SCNC: 146 MMOL/L — HIGH (ref 132–145)
SP GR SPEC: >=1.03 — SIGNIFICANT CHANGE UP (ref 1–1.03)
UROBILINOGEN FLD QL: 0.2 E.U./DL — SIGNIFICANT CHANGE UP
WBC # BLD: 10.1 K/UL — SIGNIFICANT CHANGE UP (ref 3.8–10.5)
WBC # FLD AUTO: 10.1 K/UL — SIGNIFICANT CHANGE UP (ref 3.8–10.5)

## 2019-08-20 PROCEDURE — 99284 EMERGENCY DEPT VISIT MOD MDM: CPT

## 2019-08-20 PROCEDURE — 90792 PSYCH DIAG EVAL W/MED SRVCS: CPT | Mod: GT

## 2019-08-20 RX ORDER — HYDROXYZINE HCL 10 MG
1 TABLET ORAL
Qty: 20 | Refills: 0
Start: 2019-08-20

## 2019-08-20 RX ORDER — NICOTINE POLACRILEX 2 MG
1 GUM BUCCAL ONCE
Refills: 0 | Status: COMPLETED | OUTPATIENT
Start: 2019-08-20 | End: 2019-08-20

## 2019-08-20 RX ORDER — TRAMADOL HYDROCHLORIDE 50 MG/1
50 TABLET ORAL ONCE
Refills: 0 | Status: DISCONTINUED | OUTPATIENT
Start: 2019-08-20 | End: 2019-08-20

## 2019-08-20 RX ORDER — KETOROLAC TROMETHAMINE 30 MG/ML
60 SYRINGE (ML) INJECTION ONCE
Refills: 0 | Status: DISCONTINUED | OUTPATIENT
Start: 2019-08-20 | End: 2019-08-20

## 2019-08-20 RX ORDER — ACETAMINOPHEN 500 MG
975 TABLET ORAL ONCE
Refills: 0 | Status: COMPLETED | OUTPATIENT
Start: 2019-08-20 | End: 2019-08-20

## 2019-08-20 RX ORDER — HYDROXYZINE HCL 10 MG
50 TABLET ORAL ONCE
Refills: 0 | Status: COMPLETED | OUTPATIENT
Start: 2019-08-20 | End: 2019-08-20

## 2019-08-20 RX ADMIN — Medication 60 MILLIGRAM(S): at 12:48

## 2019-08-20 RX ADMIN — Medication 975 MILLIGRAM(S): at 12:48

## 2019-08-20 RX ADMIN — Medication 1 PATCH: at 12:28

## 2019-08-20 RX ADMIN — Medication 50 MILLIGRAM(S): at 12:49

## 2019-08-20 RX ADMIN — TRAMADOL HYDROCHLORIDE 50 MILLIGRAM(S): 50 TABLET ORAL at 12:49

## 2019-08-20 NOTE — ED BEHAVIORAL HEALTH ASSESSMENT NOTE - SUMMARY
Patient is a 33yo French M, single, non-caregiver, domiciled at shelter since d/c from substance detox facility 5 days ago (was there a month), unemployed, with extensive PPhx (see below), known hx of malingering, multiple prior psych admissions, prior suicide attempt by overdose, denies hx of violence, current active polysubstance abuse, and no significant PMhx. Patient self presents today for suicidal ideation in the context of relapse on substances upon discharge from detox. Patient has presented to ED multiple times in past for similar sitiuations, often seeking inpt admission due to lack of housing.    Per psyckes patient’s most frequent diagnoses are alcohol related d/o, opioid related d/o, other psychoactive substance d/o, cocaine related d/o, and Bipolar 1. Patient was admitted to [facility] for inpatient detox for alcohol dependence from 6/28/19 to 7/3/2019 and 20 additional detox/rehab admissions in 2019 alone. Patient’s most recent psychiatric admission on record is  5/11/2019 to 5/14/2019 for primary Opioid dependence and presented to [facility] in May 2019 primary dx psychoactive substance dependence, patient has multiple additional psychiatric admissions. Patient was attending [facility] clinic for behavioral health outpatient as of May 2019 but no subsequent clinic visits or noted.     On evaluation today pt reported depressed mood and suicidal ideation with no plan or intent in the context of substance relapse. Is interested in going back to detox/rehab and is able to engage in safety planning. At this time pt will be transferred to [facility] for inpt substance abuse treatment.

## 2019-08-20 NOTE — ED ADULT TRIAGE NOTE - CHIEF COMPLAINT QUOTE
PT walk into ER with c/o suicidal ideation. Pt was seen at [facility] ER for abdominal abscess this AM, still wearing hospital bracelet. Pt states he did not have thoughts of suicide at that time. Pt states he has auditory hallucinations. Pt states his plan to kill himself would be to "get into a gang fight". Pt states "sometimes I also feel like hurting other people but that's normal right". Denies any visual hallucinations. PT reports cocaine use. Denies alcohol use.

## 2019-08-20 NOTE — ED BEHAVIORAL HEALTH ASSESSMENT NOTE - DIFFERENTIAL
substance-induced mood d/o vs bipolar 1 disorder vs. adjustment d/o unspecified  polysubstance use disorders    C-SSRS Screener     1. Have you ever wished to be dead or wished you could go to sleep and not wake up?  [X  ]Yes, [  ]No, [  ]Unable to Assess     2. Have you actually had any thoughts of killing yourself?   [X  ]Yes, [  ]No, [  ]Unable to Assess     If answer is “No” for 1 and 2, stop here. If answer is “Yes” to 1 or 2, proceed to 3.     3. Have you been thinking about how you might kill yourself?  [  ]Yes, [ X ]No, [  ]Unable to Assess     4. Have you had these thoughts and had some intention of acting on them?  [  ]Yes, [ X ]No, [  ]Unable to Assess     5. Have you started to work out or worked out the details of how to kill yourself? Do you intend to carry out this plan?  [  ]Yes, [X  ]No, [  ]Unable to Assess     6. Have you ever done anything, started to do anything, or prepared to do anything to end your life? If so, was it in the past 3 months?  [  ]Yes, [ X ]No, [  ]Unable to Assess    Details:_________________________________

## 2019-08-20 NOTE — ED BEHAVIORAL HEALTH ASSESSMENT NOTE - OTHER
relapse on substances No suicidal plan or intent, +future-oriented thinking chronically limited see above

## 2019-08-20 NOTE — ED PROVIDER NOTE - PMH
ADD (attention deficit disorder)    Bipolar disorder    Chronic drug abuse    HTN (hypertension)    Polysubstance abuse    Seizures ADD (attention deficit disorder)    Anxiety    Bipolar disorder    Chronic drug abuse    Depression    HTN (hypertension)    Polysubstance abuse    Seizures

## 2019-08-20 NOTE — SBIRT NOTE ADULT - NSSBIRTALCACTIVEREFTXDET_GEN_A_CORE
Provided SBIRT services: Full screen positive. Referral to Treatment Performed. Screening results were reviewed with the patient and patient was provided information about healthy guidelines and potential negative consequences associated with level of risk. Motivation and readiness to reduce or stop use was discussed and goals and activities to make changes were suggested/offered./

## 2019-08-20 NOTE — ED ADULT NURSE NOTE - OBJECTIVE STATEMENT
Pt c/o Suicidal ideation- reports his plan would be to involve himself in a gang fight. pt recently d/c'd tonight from [facility] er for evaluation of abd abscess. pt reports he was high on cocaine at that time and was not suicidal at that time. c/o Auditory hallucinations telling him to hurt others. denies visual hallucinations. pt calm and cooperative.

## 2019-08-20 NOTE — ED PROVIDER NOTE - PROGRESS NOTE DETAILS
telepsych consulted, pt is currently AxOx3, coherent and conversive, medically stable for psych evaluation currently awaiting SBIRT and outpt detox arrangement

## 2019-08-20 NOTE — ED PROVIDER NOTE - CARE PROVIDER_API CALL
Taya Hsu)  Psychiatry  [address] [address]  Phone: [phone]  Fax: [phone]  Follow Up Time:

## 2019-08-20 NOTE — ED BEHAVIORAL HEALTH ASSESSMENT NOTE - AXIS IV
Problem related to social environment/Housing problems/Problems with primary support/Occupational problems/Economic problems

## 2019-08-20 NOTE — ED BEHAVIORAL HEALTH ASSESSMENT NOTE - DESCRIPTION (FIRST USE, LAST USE, QUANTITY, FREQUENCY, DURATION)
several cigarettes/day Heavy Had 3 24 oz of beer in the past 5 days. pattern of alcohol, heroin snorting, crack/cocaine use, recreational xanax, marijuana abuse since adolescence. Daily recreational, also K2 last used cocaine yesterday heroin (last bag of heroin 3 days ago) total used 8mg klonopin, 4mg ativan, and 4mg xanax in past 5 days hx of K2 abuse, caffeine abuse (liters of coffee plus red bulls as cocaine substitute per chart) Had 3 24 oz of beer in the past 5 days. Heavy pattern of alcohol, heroin snorting, crack/cocaine use, recreational xanax, marijuana abuse since adolescence.

## 2019-08-20 NOTE — ED ADULT NURSE REASSESSMENT NOTE - NS ED NURSE REASSESS COMMENT FT1
received patient resting comfortably in bed. pt is on constant observation. pt is cooperative at this time. will cont to monitor. safety maintained

## 2019-08-20 NOTE — ED BEHAVIORAL HEALTH ASSESSMENT NOTE - DESCRIPTION
Per chart review and JANET Vivas reports: Patient has been calm and cooperative. Admit to using heroin 3 days ago in addition to multiple other substances. Ate a sandwich and slept in the ED. Has been cooperative with safety protocols. Nothing of note in belongings. Has not endorsed depression or SI/HI in ED, has not endorsed psychotic symptoms or appeared internally preoccupied. Has been pleasant and engaging well with staff, appears euthymic. Has not been agitated or required PRNs/ restraints.    Utox +benzo, THC, cocaine, BAL 6    Vital Signs Last 24 Hrs  T(C): 36.7 (20 Aug 2019 13:52), Max: 36.7 (20 Aug 2019 13:52)  T(F): 98 (20 Aug 2019 13:52), Max: 98 (20 Aug 2019 13:52)  HR: 73 (20 Aug 2019 13:52) (60 - 100)  BP: 122/79 (20 Aug 2019 13:52) (110/78 - 130/74)  BP(mean): --  RR: 18 (20 Aug 2019 13:52) (16 - 18)  SpO2: 98% (20 Aug 2019 13:52) (97% - 99%) per chart: history of seizures, HTN - not on meds for these [employer] [employer], sometimes relies on mother in [place] but not providing contact info, unemployed

## 2019-08-20 NOTE — ED PROVIDER NOTE - PHYSICAL EXAMINATION
Vital Signs - nursing notes reviewed and confirmed  Gen - Unkempt M, NAD, comfortable and non-toxic appearing, speaking in full sentences   Skin - warm, dry, intact, small abdominal wall cellulitic skin changes with no focal fluctuance, edema, warmth, streaking, crepitus or bleeding   HEENT - AT/NC, PERRL, EOMI, mild b/l conjunctival injection, pupils 3mm b/l, moist oral mucosa, TM intact b/l with good cone of lights, o/p clear with no erythema, edema, or exudate, uvula midline, airway patent, neck supple and NT, FROM, no JVD or carotid bruits b/l, no palpable nodes  CV - S1S2, R/R/R  Resp - respiration non-labored, CTAB, symmetric bs b/l, no r/r/w  GI - NABS, soft, ND, NT, no rebound or guarding, no CVAT b/l   MS - w/w/p, no c/c/e, calves supple and NT, distal pulses symmetric b/l, brisk cap refills, +SILT  Psych - euphoric, normal speech and eye contact, judgement and insight intact, +SI, no HI/AH/VH/delusion   Neuro - AxOx3, no focal neuro deficits negative pronator drift, negative nystagmus, ambulatory without gait disturbance

## 2019-08-20 NOTE — ED BEHAVIORAL HEALTH ASSESSMENT NOTE - DETAILS
Reports past attempts. States over 2 years ago "took Tylenol" but no medical treatment, reports "heroin overdose" and "trying to hang self" in Jan 2017 charted history of DT/seizures "restless legs" on Risperdal- had to go to ED for cogentin IM. self

## 2019-08-20 NOTE — ED ADULT NURSE NOTE - CHIEF COMPLAINT QUOTE
PT walk into ER with c/o suicidal ideation. Pt was seen at [facility] ER for abdominal abscess this AM, still wearing hospital bracelet. Pt states he did not have thoughts of suicide at that time. Pt states he has auditory hallucinations. Pt states his plan to kill himself would be to "get into a gang fight". Pt states "sometimes I also feel like hurting other people but that's normal right". Denies any visual hallucinations. PT reports cocaine use. Denies alcohol use.

## 2019-08-20 NOTE — ED BEHAVIORAL HEALTH ASSESSMENT NOTE - PAST PSYCHOTROPIC MEDICATION
Risperdal, Paliperidone, Naltrexone, Bupropion, Sertraline, Paroxetine, Citalopram, Haloperidol, Lamotrigine, Atomoxetine, hydroxyzine, Olanzapine, abilify, neurontin, trazodone, melatonin, campral, suboxone

## 2019-08-20 NOTE — ED BEHAVIORAL HEALTH ASSESSMENT NOTE - UNDOMICILED
Shelters; has been staying at a shelter since d/c from [facility] facility in the [place] 5 days ago

## 2019-08-20 NOTE — ED BEHAVIORAL HEALTH ASSESSMENT NOTE - RISK ASSESSMENT
Risk factors include substance abuse, prior inpt hospitalizations, poor social structure and non-compliance with treatment.   Protective factors include +future-oriented thinking, known hx of malingering, lack of suicidal plan or intent    Acute Suicide Risk  (  ) High   (  ) Moderate   ( X ) Low   (  ) Unable to determine   Rationale ____see above_______    Elevated Chronic Risk   (X  ) Yes ____see above_______  Details ___________  (  ) No   ___________    Additional Suicide Risk Factors (select all that apply)  [  ]Access to lethal means including firearms  [  ]Family history of suicide  [  ]Impulsivity  [ X] Current or past mood disorder  [  ] Current or past psychotic disorder  [  ] Current or past PTSD  [  ] Current or past ADHD  [  ] Current or past TBI  [  ] Current or past cluster B personality disorder or traits  [  ] Current or past conduct problems  [  ] Recent onset of current or past psychiatric disorder  [  ] Family history of psychiatric diagnoses requiring hospitalization     Additional Activating Events (select all that apply)  [  ]Perceived burden on family or others  [  ]Current sexual or physical abuse  [X  ]Substance intoxication or withdrawal  [ X ]Inadequate social supports  [  ]Hopeless about or dissatisfied with current provider or treatment     Additional Protective Factors (select all that apply)  [ X ] Future plans  [  ] [employer] beliefs  [  ] Beloved pets    Details: ___________  [ x ] Safety plan discussed with patient  [ x ] Education provided regarding environmental safety / lethal means restriction  [x  ] Provision of National Suicide Prevention Lifeline 6-740-327-KJPX (1273)

## 2019-08-20 NOTE — ED PROVIDER NOTE - OBJECTIVE STATEMENT
35 yo M with PMHx of 35 yo M with PMHx of ADHD, MDD, RADHA, bipolar, off all psych meds x 1 month (Lamictal, abilify, trazadone, and melatonin), polysubstance abuse (heroin and cocaine, last used 1d ago), recently dc'd from detox center 5d ago ([place]), HTN, seen at [facility] earlier this morning for relapse on substance use, and abdominal wall cellulitis, was given a dose of xanax and abx in the ED, and d/c'd without meds, presenting to our ED c/o feeling more depressed and compulsive to "OD on drugs and hurt myself." Pt reports being off his psych meds during detox period and that exacerbated his "toxic and abusive behaviors." Admits to feeling paranoid and having increased thoughts about overdosing on illicit drugs.  Denies HI/AH/VH/delusion, HA, dizziness, CP, SOB, palpitations, N/V/D/C, abdominal pain, change in urinary/bowel function, tremors, focal weakness, and fever/chills. Admits to alcohol and drug use one day ago

## 2019-08-20 NOTE — ED BEHAVIORAL HEALTH ASSESSMENT NOTE - ACTIVATING EVENTS/STRESSORS
Pending incarceration or homelessness/Current or pending isolation/Other/Non-compliant with treatment

## 2019-08-20 NOTE — ED PROVIDER NOTE - CLINICAL SUMMARY MEDICAL DECISION MAKING FREE TEXT BOX
pt with multiple psychiatric d/o and polysubstance abuse, recently dc'd from detox center in [place] and noted to relapse in drugs and alcohol, reports feeling more depressed and suicidal in setting of drug use in the past few days and off psychotropic meds x 1 month, labs and EKG wnl, cooperative on exam, seen by telepsych and offered to return to outpt detox, seen and arranged by SBIRT team here, pt reassessed at bedside multiple times by ED and telepsych, medically and psychiatrically stable for dc

## 2019-08-20 NOTE — ED PROVIDER NOTE - ATTENDING CONTRIBUTION TO CARE
Patient presenting with SI- also has PSA nad wants detox. VSS. Calm, coop, + subjective opioid WD. Retracted SI and was cleared by psych with plan to get picked by for detox and tx of dual diagnosis.

## 2019-08-20 NOTE — ED BEHAVIORAL HEALTH ASSESSMENT NOTE - HPI (INCLUDE ILLNESS QUALITY, SEVERITY, DURATION, TIMING, CONTEXT, MODIFYING FACTORS, ASSOCIATED SIGNS AND SYMPTOMS)
Patient is a 35yo Irish M, single, non-caregiver, domiciled at shelter since d/c from substance detox facility 5 days ago (was there a month), unemployed, with extensive PPhx (see below), known hx of malingering, multiple prior psych admissions, prior suicide attempt by overdose, denies hx of violence, current active polysubstance abuse, and no significant PMhx. Patient self presents today for suicidal ideation in the context of relapse on substances upon discharge from detox. Patient has presented to ED multiple times in past for similar sitiuations, often seeking inpt admission due to lack of housing.    Per psyckes patient’s most frequent diagnoses are alcohol related d/o, opioid related d/o, other psychoactive substance d/o, cocaine related d/o, and Bipolar 1. Patient was admitted to [facility] for inpatient detox for alcohol dependence from 6/28/19 to 7/3/2019 and 20 additional detox/rehab admissions in 2019 alone. Patient’s most recent psychiatric admission on record is  5/11/2019 to 5/14/2019 for primary Opioid dependence and presented to [facility] in May 2019 primary dx psychoactive substance dependence, patient has multiple additional psychiatric admissions. Patient was attending [facility] clinic for behavioral health outpatient as of May 2019 but no subsequent clinic visits or noted.     Patient states that he was dced from a 1 month detox program 5 days ago and immediately relapsed on substances including cocaine, heroin, K2, MJ, alcohol, and benzos. States that he obtained the drugs by begging and stealing. States that he has been feeling depressed with suicidal ideation for the past couple hours in the context of crashing from substances. States that he would like to be admitted either to inpt psych or back to detox/rehab. States that if he goes to detox/rehab and can be transported there from the ED, then he will not be suicidal and can keep himself safe. Denies plan or intent for suicide. Denies consistent disturbance of mood including depressed, anxious, irritable, or euphoric mood. Denies psychotic or manic symptoms. Reports sleeping 8-10 hrs per night with stable energy level, appetite, and concentration. States all his psychotropic meds were discontinued in the detox facility and he is not currently taking any psychotropics. Denies any family or friends to provide collateral information at this time. Denies current symptoms of ETOH or benzo withdrawal.

## 2019-08-24 DIAGNOSIS — F19.10 OTHER PSYCHOACTIVE SUBSTANCE ABUSE, UNCOMPLICATED: ICD-10-CM

## 2019-08-24 DIAGNOSIS — R45.851 SUICIDAL IDEATIONS: ICD-10-CM

## 2019-08-24 DIAGNOSIS — F11.10 OPIOID ABUSE, UNCOMPLICATED: ICD-10-CM

## 2019-08-24 DIAGNOSIS — R21 RASH AND OTHER NONSPECIFIC SKIN ERUPTION: ICD-10-CM

## 2019-08-24 DIAGNOSIS — F14.10 COCAINE ABUSE, UNCOMPLICATED: ICD-10-CM

## 2019-08-24 DIAGNOSIS — F17.200 NICOTINE DEPENDENCE, UNSPECIFIED, UNCOMPLICATED: ICD-10-CM

## 2019-08-29 ENCOUNTER — EMERGENCY (EMERGENCY)
Facility: HOSPITAL | Age: 34
LOS: 1 days | Discharge: HOME | End: 2019-08-29
Attending: EMERGENCY MEDICINE | Admitting: EMERGENCY MEDICINE
Payer: MEDICAID

## 2019-08-29 VITALS
DIASTOLIC BLOOD PRESSURE: 71 MMHG | SYSTOLIC BLOOD PRESSURE: 123 MMHG | HEART RATE: 103 BPM | TEMPERATURE: 98 F | RESPIRATION RATE: 18 BRPM | WEIGHT: 164.91 LBS | HEIGHT: 63 IN | OXYGEN SATURATION: 96 %

## 2019-08-29 VITALS
OXYGEN SATURATION: 100 % | TEMPERATURE: 98 F | SYSTOLIC BLOOD PRESSURE: 128 MMHG | RESPIRATION RATE: 18 BRPM | HEART RATE: 90 BPM | DIASTOLIC BLOOD PRESSURE: 98 MMHG

## 2019-08-29 DIAGNOSIS — R00.2 PALPITATIONS: ICD-10-CM

## 2019-08-29 PROBLEM — F41.9 ANXIETY DISORDER, UNSPECIFIED: Chronic | Status: ACTIVE | Noted: 2019-08-20

## 2019-08-29 PROBLEM — F32.9 MAJOR DEPRESSIVE DISORDER, SINGLE EPISODE, UNSPECIFIED: Chronic | Status: ACTIVE | Noted: 2019-08-20

## 2019-08-29 LAB
ALBUMIN SERPL ELPH-MCNC: 4.2 G/DL — SIGNIFICANT CHANGE UP (ref 3.3–5)
ALP SERPL-CCNC: 58 U/L — SIGNIFICANT CHANGE UP (ref 40–120)
ALT FLD-CCNC: SIGNIFICANT CHANGE UP U/L (ref 10–45)
ANION GAP SERPL CALC-SCNC: 10 MMOL/L — SIGNIFICANT CHANGE UP (ref 5–17)
AST SERPL-CCNC: SIGNIFICANT CHANGE UP U/L (ref 10–40)
BASOPHILS # BLD AUTO: 0.04 K/UL — SIGNIFICANT CHANGE UP (ref 0–0.2)
BASOPHILS NFR BLD AUTO: 0.4 % — SIGNIFICANT CHANGE UP (ref 0–2)
BILIRUB SERPL-MCNC: 0.4 MG/DL — SIGNIFICANT CHANGE UP (ref 0.2–1.2)
BUN SERPL-MCNC: 11 MG/DL — SIGNIFICANT CHANGE UP (ref 7–23)
CALCIUM SERPL-MCNC: 9.2 MG/DL — SIGNIFICANT CHANGE UP (ref 8.4–10.5)
CHLORIDE SERPL-SCNC: 103 MMOL/L — SIGNIFICANT CHANGE UP (ref 96–108)
CK MB CFR SERPL CALC: 2.5 NG/ML — SIGNIFICANT CHANGE UP (ref 0–6.7)
CO2 SERPL-SCNC: 28 MMOL/L — SIGNIFICANT CHANGE UP (ref 22–31)
CREAT SERPL-MCNC: 0.68 MG/DL — SIGNIFICANT CHANGE UP (ref 0.5–1.3)
EOSINOPHIL # BLD AUTO: 0.14 K/UL — SIGNIFICANT CHANGE UP (ref 0–0.5)
EOSINOPHIL NFR BLD AUTO: 1.3 % — SIGNIFICANT CHANGE UP (ref 0–6)
GLUCOSE SERPL-MCNC: 102 MG/DL — HIGH (ref 70–99)
HCT VFR BLD CALC: 40.1 % — SIGNIFICANT CHANGE UP (ref 39–50)
HGB BLD-MCNC: 12.7 G/DL — LOW (ref 13–17)
IMM GRANULOCYTES NFR BLD AUTO: 0.4 % — SIGNIFICANT CHANGE UP (ref 0–1.5)
LYMPHOCYTES # BLD AUTO: 1.65 K/UL — SIGNIFICANT CHANGE UP (ref 1–3.3)
LYMPHOCYTES # BLD AUTO: 14.9 % — SIGNIFICANT CHANGE UP (ref 13–44)
MCHC RBC-ENTMCNC: 27.7 PG — SIGNIFICANT CHANGE UP (ref 27–34)
MCHC RBC-ENTMCNC: 31.7 GM/DL — LOW (ref 32–36)
MCV RBC AUTO: 87.4 FL — SIGNIFICANT CHANGE UP (ref 80–100)
MONOCYTES # BLD AUTO: 0.58 K/UL — SIGNIFICANT CHANGE UP (ref 0–0.9)
MONOCYTES NFR BLD AUTO: 5.3 % — SIGNIFICANT CHANGE UP (ref 2–14)
NEUTROPHILS # BLD AUTO: 8.59 K/UL — HIGH (ref 1.8–7.4)
NEUTROPHILS NFR BLD AUTO: 77.7 % — HIGH (ref 43–77)
NRBC # BLD: 0 /100 WBCS — SIGNIFICANT CHANGE UP (ref 0–0)
PLATELET # BLD AUTO: 356 K/UL — SIGNIFICANT CHANGE UP (ref 150–400)
POTASSIUM SERPL-MCNC: SIGNIFICANT CHANGE UP MMOL/L (ref 3.5–5.3)
POTASSIUM SERPL-SCNC: SIGNIFICANT CHANGE UP MMOL/L (ref 3.5–5.3)
PROT SERPL-MCNC: 7.1 G/DL — SIGNIFICANT CHANGE UP (ref 6–8.3)
RBC # BLD: 4.59 M/UL — SIGNIFICANT CHANGE UP (ref 4.2–5.8)
RBC # FLD: 15.2 % — HIGH (ref 10.3–14.5)
SODIUM SERPL-SCNC: 141 MMOL/L — SIGNIFICANT CHANGE UP (ref 135–145)
TROPONIN T SERPL-MCNC: <0.01 NG/ML — SIGNIFICANT CHANGE UP (ref 0–0.01)
WBC # BLD: 11.04 K/UL — HIGH (ref 3.8–10.5)
WBC # FLD AUTO: 11.04 K/UL — HIGH (ref 3.8–10.5)

## 2019-08-29 PROCEDURE — 93010 ELECTROCARDIOGRAM REPORT: CPT

## 2019-08-29 PROCEDURE — 96374 THER/PROPH/DIAG INJ IV PUSH: CPT

## 2019-08-29 PROCEDURE — 71046 X-RAY EXAM CHEST 2 VIEWS: CPT | Mod: 26

## 2019-08-29 PROCEDURE — 82553 CREATINE MB FRACTION: CPT

## 2019-08-29 PROCEDURE — 84484 ASSAY OF TROPONIN QUANT: CPT

## 2019-08-29 PROCEDURE — 99284 EMERGENCY DEPT VISIT MOD MDM: CPT | Mod: 25

## 2019-08-29 PROCEDURE — 36415 COLL VENOUS BLD VENIPUNCTURE: CPT

## 2019-08-29 PROCEDURE — 71046 X-RAY EXAM CHEST 2 VIEWS: CPT

## 2019-08-29 PROCEDURE — 82550 ASSAY OF CK (CPK): CPT

## 2019-08-29 PROCEDURE — 80053 COMPREHEN METABOLIC PANEL: CPT

## 2019-08-29 PROCEDURE — 85025 COMPLETE CBC W/AUTO DIFF WBC: CPT

## 2019-08-29 PROCEDURE — 93005 ELECTROCARDIOGRAM TRACING: CPT

## 2019-08-29 PROCEDURE — 99285 EMERGENCY DEPT VISIT HI MDM: CPT | Mod: 25

## 2019-08-29 RX ORDER — ACETAMINOPHEN 500 MG
650 TABLET ORAL ONCE
Refills: 0 | Status: COMPLETED | OUTPATIENT
Start: 2019-08-29 | End: 2019-08-29

## 2019-08-29 RX ORDER — SODIUM CHLORIDE 9 MG/ML
1000 INJECTION INTRAMUSCULAR; INTRAVENOUS; SUBCUTANEOUS ONCE
Refills: 0 | Status: COMPLETED | OUTPATIENT
Start: 2019-08-29 | End: 2019-08-29

## 2019-08-29 RX ADMIN — Medication 650 MILLIGRAM(S): at 09:17

## 2019-08-29 RX ADMIN — Medication 1 MILLIGRAM(S): at 08:21

## 2019-08-29 RX ADMIN — SODIUM CHLORIDE 1000 MILLILITER(S): 9 INJECTION INTRAMUSCULAR; INTRAVENOUS; SUBCUTANEOUS at 07:25

## 2019-08-29 NOTE — ED PROVIDER NOTE - NS ED ROS FT
Constitutional: No fever. No chills.  Eyes: No redness. No discharge. No vision change.   ENT: No sore throat. No ear pain.  Cardiovascular: +chest pain. +palpitations. No leg swelling.  Respiratory: No cough. No shortness of breath.  GI: No abdominal pain. No vomiting. No diarrhea.   MSK: No joint pain. No back pain.   Skin: No rash. No abrasions.   Neuro: No numbness. No weakness.   Psych: No known mental health issues.

## 2019-08-29 NOTE — ED ADULT NURSE NOTE - OBJECTIVE STATEMENT
Pt states "I took Cocaine at 6 am this morning and I took too much. It feels like my heart is racing". Pt denies CP/SOB. HR . Pt placed on cardiac monitor and is in view of RN. Pt is calm and cooperative. Awaiting MD ferreira

## 2019-08-29 NOTE — ED PROVIDER NOTE - OBJECTIVE STATEMENT
35 yo M with PMHx of ADHD, MDD, RADHA, bipolar, off all psych meds x 1 month (Lamictal, abilify, trazadone, and melatonin), polysubstance abuse (heroin and cocaine, last used 1d ago), recently dc'd from detox center 2d ago ([facility]), HTN presents after polysubstance abuse with chest pain and palpitations. Pt states he was released from detox, received his paycheck and purchased $200 worth of cocaine in addition to using marijuana, benzodiazepines, and drinking beer. States he developed substernal, non-radiating, non-exertional chest pain 2hrs prior to arrival associated with racing heart beat. States he last used cocaine 30min prior to arrival. Denies fever, chills, cough, shortness of breath, abd pain, SI, HI. He is homeless, states he does not want to live in a shelter.

## 2019-08-29 NOTE — ED PROVIDER NOTE - ATTENDING CONTRIBUTION TO CARE
33 yo male h/o ADHD, MDD, RADHA, bipolar, off all psych meds x 1 month (Lamictal, abilify, trazadone, and melatonin), polysubstance abuse (heroin and cocaine, last used 1d ago), recently dc'd from detox center 2d ago ([facility]), HTN, recently admitted at [facility] for psa and depression c/o palpitations and anxiety after using cocaine, thc, benzos and beer.  + sscp w palpitations, now improved but palpitations persisted.  No sob.  Sx similar to prior sx 2/2 drugs.  Well appearing, anxious, nad, nc/at, lung cta, heart reg, abd soft, nt, ext no gross deformity, no gross neuro deficits   EKG nl, improved w benzos and ivf, labs nl.  Pt denies si/hi, hallucinations.  Pt dc'd.

## 2019-08-29 NOTE — ED PROVIDER NOTE - PATIENT PORTAL LINK FT
You can access the FollowMyHealth Patient Portal offered by [facility] by registering at the following website: [email or web address]. By joining [facility]’s FollowMyHealth portal, you will also be able to view your health information using other applications (apps) compatible with our system.

## 2019-08-29 NOTE — ED ADULT TRIAGE NOTE - CHIEF COMPLAINT QUOTE
"I am overdosing on cocaine.  I last took some at 6am.  I feel like my heart is racing in my chest."  Denies SOB, n/v, dizziness, abd pain.  Patient is A&Ox4 and does not appear to be in distress at this time.

## 2019-08-29 NOTE — ED PROVIDER NOTE - CLINICAL SUMMARY MEDICAL DECISION MAKING FREE TEXT BOX
ED course unremarkable - afebrile and hemodynamically stable. ED course unremarkable - afebrile and hemodynamically stable. EKG with NSR with rate 100 bpm, no evidence of ischemia. Troponin, CKMB neg. CBC, CMP unremarkable. K+ hemolyzed, but pt has normal renal function and does not want to wait for additional labs.  CXR without acute cardiopulmonary abnormality. Pt given 1L NS and ativan 1mg IV while in ED with symptomatic improvement. Denies SI or HI. Declined SW intervention. Requesting discharge. Strict return precautions given. ED course unremarkable - afebrile and hemodynamically stable. EKG with NSR with rate 100 bpm, no evidence of ischemia. Troponin, CKMB neg. CBC, CMP unremarkable. K+ hemolyzed, but pt has normal renal function and does not want to wait for additional labs.  CXR without acute cardiopulmonary abnormality. Pt given 1L NS and ativan 1mg IV while in ED with symptomatic improvement. Pt had CP x3hr prior to ED presentation with neg troponin and EKG so low suspicion for cardiac ischemia. Denies SI or HI. Declined SW intervention. Requesting discharge. Strict return precautions given.

## 2019-08-29 NOTE — ED ADULT NURSE NOTE - PMH
ADD (attention deficit disorder)    Anxiety    Bipolar disorder    Chronic drug abuse    Depression    HTN (hypertension)    Polysubstance abuse    Seizures

## 2019-08-29 NOTE — ED PROVIDER NOTE - NSFOLLOWUPINSTRUCTIONS_ED_ALL_ED_FT
Avoid cocaine use as this will make your symptoms worse.  Please follow up with a primary care doctor. [facility] has a free clinic you can attend -   Schedule an Appointment  Clinic Hours: Sat 9am-1pm    Walk-ins: Sat 8:30am-10am    Tel. [phone]    Return to the Emergency Department if you develop fever>100.4F, worsening chest pain or palpitations, shortness of breath, or any other concerns.

## 2019-09-04 ENCOUNTER — EMERGENCY (EMERGENCY)
Facility: HOSPITAL | Age: 34
LOS: 1 days | Discharge: HOME | End: 2019-09-04
Attending: EMERGENCY MEDICINE | Admitting: EMERGENCY MEDICINE
Payer: MEDICAID

## 2019-09-04 VITALS
OXYGEN SATURATION: 97 % | RESPIRATION RATE: 16 BRPM | DIASTOLIC BLOOD PRESSURE: 81 MMHG | TEMPERATURE: 98 F | SYSTOLIC BLOOD PRESSURE: 124 MMHG | HEART RATE: 96 BPM | WEIGHT: 164.91 LBS | HEIGHT: 63 IN

## 2019-09-04 LAB
ALBUMIN SERPL ELPH-MCNC: 4.1 G/DL — SIGNIFICANT CHANGE UP (ref 3.3–5)
ALP SERPL-CCNC: 56 U/L — SIGNIFICANT CHANGE UP (ref 40–120)
ALT FLD-CCNC: 14 U/L — SIGNIFICANT CHANGE UP (ref 10–45)
ANION GAP SERPL CALC-SCNC: 13 MMOL/L — SIGNIFICANT CHANGE UP (ref 5–17)
APPEARANCE UR: CLEAR — SIGNIFICANT CHANGE UP
AST SERPL-CCNC: 13 U/L — SIGNIFICANT CHANGE UP (ref 10–40)
BASOPHILS # BLD AUTO: 0.05 K/UL — SIGNIFICANT CHANGE UP (ref 0–0.2)
BASOPHILS NFR BLD AUTO: 0.5 % — SIGNIFICANT CHANGE UP (ref 0–2)
BILIRUB SERPL-MCNC: 0.8 MG/DL — SIGNIFICANT CHANGE UP (ref 0.2–1.2)
BILIRUB UR-MCNC: ABNORMAL
BUN SERPL-MCNC: 19 MG/DL — SIGNIFICANT CHANGE UP (ref 7–23)
CALCIUM SERPL-MCNC: 9 MG/DL — SIGNIFICANT CHANGE UP (ref 8.4–10.5)
CHLORIDE SERPL-SCNC: 105 MMOL/L — SIGNIFICANT CHANGE UP (ref 96–108)
CO2 SERPL-SCNC: 23 MMOL/L — SIGNIFICANT CHANGE UP (ref 22–31)
COLOR SPEC: YELLOW — SIGNIFICANT CHANGE UP
CREAT SERPL-MCNC: 0.64 MG/DL — SIGNIFICANT CHANGE UP (ref 0.5–1.3)
DIFF PNL FLD: NEGATIVE — SIGNIFICANT CHANGE UP
EOSINOPHIL # BLD AUTO: 0.34 K/UL — SIGNIFICANT CHANGE UP (ref 0–0.5)
EOSINOPHIL NFR BLD AUTO: 3.6 % — SIGNIFICANT CHANGE UP (ref 0–6)
ETHANOL SERPL-MCNC: <10 MG/DL — SIGNIFICANT CHANGE UP (ref 0–10)
GLUCOSE SERPL-MCNC: 108 MG/DL — HIGH (ref 70–99)
GLUCOSE UR QL: NEGATIVE — SIGNIFICANT CHANGE UP
HCT VFR BLD CALC: 40.4 % — SIGNIFICANT CHANGE UP (ref 39–50)
HGB BLD-MCNC: 13.1 G/DL — SIGNIFICANT CHANGE UP (ref 13–17)
IMM GRANULOCYTES NFR BLD AUTO: 0.3 % — SIGNIFICANT CHANGE UP (ref 0–1.5)
KETONES UR-MCNC: 40 MG/DL
LEUKOCYTE ESTERASE UR-ACNC: NEGATIVE — SIGNIFICANT CHANGE UP
LYMPHOCYTES # BLD AUTO: 2.65 K/UL — SIGNIFICANT CHANGE UP (ref 1–3.3)
LYMPHOCYTES # BLD AUTO: 27.9 % — SIGNIFICANT CHANGE UP (ref 13–44)
MCHC RBC-ENTMCNC: 28.3 PG — SIGNIFICANT CHANGE UP (ref 27–34)
MCHC RBC-ENTMCNC: 32.4 GM/DL — SIGNIFICANT CHANGE UP (ref 32–36)
MCV RBC AUTO: 87.3 FL — SIGNIFICANT CHANGE UP (ref 80–100)
MONOCYTES # BLD AUTO: 1.2 K/UL — HIGH (ref 0–0.9)
MONOCYTES NFR BLD AUTO: 12.6 % — SIGNIFICANT CHANGE UP (ref 2–14)
NEUTROPHILS # BLD AUTO: 5.23 K/UL — SIGNIFICANT CHANGE UP (ref 1.8–7.4)
NEUTROPHILS NFR BLD AUTO: 55.1 % — SIGNIFICANT CHANGE UP (ref 43–77)
NITRITE UR-MCNC: NEGATIVE — SIGNIFICANT CHANGE UP
NRBC # BLD: 0 /100 WBCS — SIGNIFICANT CHANGE UP (ref 0–0)
PCP SPEC-MCNC: SIGNIFICANT CHANGE UP
PH UR: 6.5 — SIGNIFICANT CHANGE UP (ref 5–8)
PLATELET # BLD AUTO: 356 K/UL — SIGNIFICANT CHANGE UP (ref 150–400)
POTASSIUM SERPL-MCNC: 3.6 MMOL/L — SIGNIFICANT CHANGE UP (ref 3.5–5.3)
POTASSIUM SERPL-SCNC: 3.6 MMOL/L — SIGNIFICANT CHANGE UP (ref 3.5–5.3)
PROT SERPL-MCNC: 6.8 G/DL — SIGNIFICANT CHANGE UP (ref 6–8.3)
PROT UR-MCNC: ABNORMAL MG/DL
RBC # BLD: 4.63 M/UL — SIGNIFICANT CHANGE UP (ref 4.2–5.8)
RBC # FLD: 15.9 % — HIGH (ref 10.3–14.5)
SALICYLATES SERPL-MCNC: <0.3 MG/DL — LOW (ref 2.8–20)
SODIUM SERPL-SCNC: 141 MMOL/L — SIGNIFICANT CHANGE UP (ref 135–145)
SP GR SPEC: 1.02 — SIGNIFICANT CHANGE UP (ref 1–1.03)
UROBILINOGEN FLD QL: 4 E.U./DL
WBC # BLD: 9.5 K/UL — SIGNIFICANT CHANGE UP (ref 3.8–10.5)
WBC # FLD AUTO: 9.5 K/UL — SIGNIFICANT CHANGE UP (ref 3.8–10.5)

## 2019-09-04 PROCEDURE — 80307 DRUG TEST PRSMV CHEM ANLYZR: CPT

## 2019-09-04 PROCEDURE — 99285 EMERGENCY DEPT VISIT HI MDM: CPT

## 2019-09-04 PROCEDURE — 80053 COMPREHEN METABOLIC PANEL: CPT

## 2019-09-04 PROCEDURE — 36415 COLL VENOUS BLD VENIPUNCTURE: CPT

## 2019-09-04 PROCEDURE — 85025 COMPLETE CBC W/AUTO DIFF WBC: CPT

## 2019-09-04 PROCEDURE — 81001 URINALYSIS AUTO W/SCOPE: CPT

## 2019-09-04 RX ADMIN — Medication 25 MILLIGRAM(S): at 06:01

## 2019-09-04 NOTE — ED PROVIDER NOTE - PROGRESS NOTE DETAILS
Pt was reassessed. The patient is now awake and alert, clinically sober. gait normal. no neuro deficits.  Patient denies any pain or other complaints.  Denies cp/sob/ha/abd pain.   No evidence of intoxication at this time or alcohol withdrawal. HE retracted his previous statements of SI. States he wants to follow up with detox to manage his substance abuse issues. he is familiar with the detox facilities in the area and is requesting a metro card to go to detox now. Pt appears stable for dc. he was advised to return to the ER for any concerning or worsening sx such as SI/HI hallucinations or seizures, etc

## 2019-09-04 NOTE — ED PROVIDER NOTE - CARE PLAN
Principal Discharge DX:	Polysubstance abuse Principal Discharge DX:	Polysubstance abuse  Secondary Diagnosis:	Anxiety

## 2019-09-04 NOTE — ED ADULT NURSE NOTE - CHIEF COMPLAINT QUOTE
Presents to ED for SI.  Reports, "I was coming from [facility] where I got an Xray and I started to feel like I wanted to kill myself.  I am withdrawing from the cocaine and benzo's I took.  My last drink of two 24oz beers was yesterday at 6AM."  States plan would be to overdose on medications he has.  Denies CP, SOB, n/v, abd pain.  Patient wanded by security and belongings secured.

## 2019-09-04 NOTE — ED ADULT TRIAGE NOTE - CHIEF COMPLAINT QUOTE
Presents to ED for SI.  Reports, "I was coming from [facility] where I got an Xray and I started to feel like I wanted to kill myself.  I am withdrawing from the cocaine and benzo's I took.  My last drink of two 24oz beers was yesterday at 6AM."  States plan would be to overdose on medications he has.  Denies CP, SOB, n/v, abd pain.  Patient wanded by security and belongings secured.

## 2019-09-04 NOTE — ED PROVIDER NOTE - OBJECTIVE STATEMENT
34M with PMHx of ADHD, MDD, RADHA, bipolar, off all psych meds x 1 month (Lamictal, abilify, trazadone, and melatonin), polysubstance abuse (heroin and cocaine, last used 1d ago), multiple detox and psych admits, HTN presents after polysubstance abuse with SI and thoughts to overdose on the pills he has.  Last drank yesterday morning. no fever, chills, cough, shortness of breath, CP, abd pain, hallucinations or HI. He is homeless, states he does not want to live in a shelter 34M with PMHx of ADHD, MDD, RADHA, bipolar, not complaint with psych meds, Lamictal, abilify, trazadone, and melatonin), polysubstance abuse (heroin and cocaine, last used 1d ago), multiple detox and psych evals, HTN presents after polysubstance abuse who p/w feeling anxious after using substances associated with SI and thoughts to overdose on the pills he has. Last drank yesterday morning. no fever, chills, sweats, tremors, shortness of breath, CP, abd pain, hallucinations or HI. He is homeless, states he does not want to live in a shelter

## 2019-09-04 NOTE — ED ADULT NURSE REASSESSMENT NOTE - NS ED NURSE REASSESS COMMENT FT1
Pt sleeping comfortably in stretcher. No visual hallucination, no sweating, no fine tremors noted. Continues to verbalize SI. 1:1 continues.

## 2019-09-04 NOTE — ED ADULT NURSE NOTE - NSIMPLEMENTINTERV_GEN_ALL_ED
Implemented All Fall Risk Interventions:  [place] to call system. Call bell, personal items and telephone within reach. Instruct patient to call for assistance. Room bathroom lighting operational. Non-slip footwear when patient is off stretcher. Physically safe environment: no spills, clutter or unnecessary equipment. Stretcher in lowest position, wheels locked, appropriate side rails in place. Provide visual cue, wrist band, yellow gown, etc. Monitor gait and stability. Monitor for mental status changes and reorient to person, place, and time. Review medications for side effects contributing to fall risk. Reinforce activity limits and safety measures with patient and family.

## 2019-09-04 NOTE — ED ADULT NURSE NOTE - OBJECTIVE STATEMENT
Pt came to the ED c/o suicidal ideation, plan to overdose on street drugs. Patient denies hallucination, no delusions. Reports hx of bipolar and noncompliance with medications. Pt admits to drinking and using drugs this AM.

## 2019-09-04 NOTE — ED PROVIDER NOTE - CLINICAL SUMMARY MEDICAL DECISION MAKING FREE TEXT BOX
34M with PMHx of ADHD, MDD, RADHA, bipolar, not complaint with psych meds, with multiple ED visits, p/w SI in the setting of substance abuse. Plan: 1:1, labs, pt allowed to rest and sleeping peacefully in the ER. Will observe and reassess for suicidality once clinically sober.

## 2019-09-04 NOTE — ED PROVIDER NOTE - PHYSICAL EXAMINATION
GEN: unkempt, intoxicated appearing, well nourished, awake, alert, oriented to person, place, time/situation and in no apparent distress. NTAF  ENT: Airway patent, Nasal mucosa clear. Mouth with normal mucosa.  EYES: Clear bilaterally.  RESPIRATORY: Breathing comfortably with normal RR.  CARDIAC: Regular rate and rhythm  ABDOMEN: Soft, nontender, +bowel sounds, no rebound, rigidity, or guarding.  MSK: Range of motion is not limited, no deformities noted.  NEURO: Alert and oriented, no focal deficits. gait normal  SKIN: Skin normal color for race, warm, dry and intact. No evidence of rash.  PSYCH: Alert and oriented to person, place, time/situation. anxious mood and affect. no apparent risk to self or others. GEN: unkempt, intoxicated appearing, well nourished, awake, alert, oriented to person, place, time/situation and in no apparent distress. NTAF  ENT: Airway patent, Nasal mucosa clear. Mouth with normal mucosa.  EYES: Clear bilaterally. Small linear abrasion under left eye with small ecchymosis and STS, no EOM, no entrapment, no proptosis.  RESPIRATORY: Breathing comfortably with normal RR.  CARDIAC: Regular rate and rhythm  ABDOMEN: Soft, nontender, +bowel sounds, no rebound, rigidity, or guarding.  MSK: Range of motion is not limited, no deformities noted.  NEURO: Alert and oriented, no focal deficits. gait normal. No tremors.  SKIN: Skin normal color for race, warm, dry. No evidence of rash.  PSYCH: Alert and oriented to person, place, time/situation. anxious mood and affect. no psychosis, fluent speech, no apparent risk to self or others.

## 2019-09-09 DIAGNOSIS — R45.851 SUICIDAL IDEATIONS: ICD-10-CM

## 2019-09-09 DIAGNOSIS — F19.129 OTHER PSYCHOACTIVE SUBSTANCE ABUSE WITH INTOXICATION, UNSPECIFIED: ICD-10-CM
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
## 2019-09-09 DIAGNOSIS — F41.9 ANXIETY DISORDER, UNSPECIFIED: ICD-10-CM

## 2019-09-09 DIAGNOSIS — S05.12XA CONTUSION OF EYEBALL AND ORBITAL TISSUES, LEFT EYE, INITIAL ENCOUNTER: ICD-10-CM
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
## 2020-01-01 NOTE — ED PROVIDER NOTE - CONDITION AT DISCHARGE:
[facility] Normal  Discharge Note    Baby Girl Cristine Chris is a 3days old female born on 2020    Prenatal history and labs are:    Information for the patient's mother:  Janeth Lopez [[id number]]   32 y.o.   OB History        1    Para   1    Term   1            AB        Living   1       SAB        TAB        Ectopic        Molar        Multiple   0    Live Births   1               40w3d   O POSITIVE         Delivery Information:     Information for the patient's mother:  Janeth Lopez [[id number]]         Information:        Weight - Scale: 6 lb 11.8 oz (3.056 kg)    Feeding Method Used: Breastfeeding    Pregnancy history, family history and nursing notes reviewed. .  Vital Signs:  Birth Weight: 6 lb 14.1 oz (3.12 kg)  Pulse 139   Temp 98.3 °F (36.8 °C)   Resp 44   Ht 20\" (50.8 cm) Comment: Filed from Delivery Summary  Wt 6 lb 11.8 oz (3.056 kg)   HC 33 cm (12.99\") Comment: Filed from Delivery Summary  BMI 11.84 kg/m²       Wt Readings from Last 3 Encounters:   20 6 lb 11.8 oz (3.056 kg) (35 %, Z= -0.39)*     * Growth percentiles are based on WHO (Girls, 0-2 years) data. The Percent Change in weight from birth weight is -2%       Physical Exam:    Constitutional: Alert, vigorous. No distress. Head: Normocephalic. Normal fontanelles. No facial anomaly. Ears: External ears normal.   Nose: Nostrils without airway obstruction. Mouth/Throat: Mucous membranes are moist. Palate intact. Oropharynx is clear. Eyes: Red reflex is present bilaterally. Neck: Full passive range of motion. Clavicles: Intact  Cardiovascular: Normal rate, regular rhythm, S1 and S2 normal, no murmur. Pulses are palpable. Pulmonary/Chest: Clear to ausculation bilaterally. No respiratory distress. Abdominal: Soft. Bowel sounds are normal. No distension, masses or organomegaly. Umbilicus normal. No tenderness, rigidity or guarding. No hernia.    Genitourinary: Normal female genitalia. Musculoskeletal: Normal ROM. Hips stable. Back: Straight, no defects   Neurological: Alert during exam. Tone normal for gestation. Normal grasp, suck, symmetric [place]. Skin: Skin is warm and dry. Capillary refill less than 3 seconds. Turgor is normal. No rash noted. No cyanosis, mottling, or pallor. No jaundice, TC Bili 4.3 mg.    Recent Labs:   Admission on 2020   Component Date Value Ref Range Status    ABO/Rh 2020 O NEGATIVE   Final    Direct Alyson 2020 NEGATIVE   Final    Du Antigen 2020 NEGATIVE   Final      Immunization History   Administered Date(s) Administered    Hepatitis B Ped/Adol (Engerix-B, Recombivax HB) 2020       Hearing Screen Result:   [place] Hearing Screening                Patient Active Problem List    Diagnosis Date Noted    Normal  (single liveborn) 2020         Assessment:  1 days day old term AGA infant female, doing well. Plan:  1. Discharge home   2. Follow up with pediatrician ([address]) in 1-2 days.    3. Feeding: breast      Sleep position on back    Electronically signed at 10:37 AM by Dmitry Raymundo MD Improved

## 2020-01-10 NOTE — ED BEHAVIORAL HEALTH ASSESSMENT NOTE - NS ED BHA SUICIDALITY PRESENT CURRENT PASSIVE IDEATION
Status:    [] Smoker - PPD:  Smoking cessation education: Provided []   Declined []    [x] Nonsmoker - Quit Date: aug 16th 2018              [] Never a smoker           Cancer Screening:  Colonoscopy [] Current [] Not current   [] Not current, but scheduled   [x] NA  Mammogram [] Current [] Not current   [] Not current, but scheduled   [x] NA  Prostate [] Current [] Not current   [] Not current, but scheduled   [x] NA  PAP/Pelvic [] Current [] Not current   [] Not current, but scheduled   [x] NA  Skin  [] Current  [] Not current   [] Not current, but scheduled   [x] NA    Hormone:  Lupron []   Last dose given:           Next dose due:   Eligard []   Last dose given:           Next dose due:   Aromatase Inhibitors []   Medication name:   N/A:  [x]         FALLS RISK SCREEN  Instructions:  Assess the patient and enter the appropriate indicators that are present for fall risk identification. Total the numbers entered and assign a fall risk score from Table 2.  Reassess patient at a minimum every 12 weeks or with status change. Assessment   Date  1/10/2020     1. Mental Ability: confusion/cognitively impaired 0     2. Elimination Issues: incontinence, frequency 0       3. Ambulatory: use of assistive devices (walker, cane, off-loading devices),        attached to equipment (IV pole, oxygen) 0     4. Sensory Limitations: dizziness, vertigo, impaired vision 0     5. Age less than 65        0     6. Age 72 or greater 1     7. Medication: diuretics, strong analgesics, hypnotics, sedatives,        antihypertensive agents 0   8. Falls:  recent history of falls within the last 3 months (not to include slipping or        tripping) 0   TOTAL 1    If score of 4 or greater was education given? No           TABLE 2   Risk Score Risk Level Plan of Care   0-3 Little or  No Risk 1. Provide assistance as indicated for ambulation activities  2. Reorient confused/cognitively impaired patient  3.   Chair/bed in low position, Yes

## 2020-01-11 ENCOUNTER — EMERGENCY (EMERGENCY)
Facility: HOSPITAL | Age: 35
LOS: 1 days | Discharge: HOME | End: 2020-01-11
Attending: EMERGENCY MEDICINE | Admitting: EMERGENCY MEDICINE
Payer: SELF-PAY

## 2020-01-11 VITALS
SYSTOLIC BLOOD PRESSURE: 140 MMHG | RESPIRATION RATE: 20 BRPM | TEMPERATURE: 98 F | DIASTOLIC BLOOD PRESSURE: 68 MMHG | OXYGEN SATURATION: 96 % | HEART RATE: 128 BPM

## 2020-01-11 PROCEDURE — 99283 EMERGENCY DEPT VISIT LOW MDM: CPT

## 2020-01-11 PROCEDURE — 99053 MED SERV 10PM-8AM 24 HR FAC: CPT

## 2020-01-11 NOTE — ED PROVIDER NOTE - NSFOLLOWUPINSTRUCTIONS_ED_ALL_ED_FT
Log Out.    [employer]®     :  [facility]             POLYSUBSTANCE ABUSE - AfterCare(R) Instructions(ER/ED)     Polysubstance Abuse    WHAT YOU NEED TO KNOW:    Polysubstance abuse is the abuse of 2 or more drugs that cause impairment or distress. Examples include alcohol, nicotine, marijuana, cocaine, heroin, methamphetamine, hallucinogens such as mushrooms, or inhalants such as paint thinner. Prescribed medicines, such as opioids for pain or benzodiazepines for anxiety, are also commonly abused.    DISCHARGE INSTRUCTIONS:    Call 911 for any of the following:     You feel you might harm yourself or others.         Return to the emergency department if:     You have a seizure.       You have chest pain and your heart is beating faster than usual.       You have new shortness of breath.       You are dizzy and lightheaded.     Contact your healthcare provider or therapist if:     You are using drugs and think you are pregnant.       You have withdrawal symptoms and want to start using drugs again.       You have questions or concerns about your condition or care.     Risks of polysubstance abuse:     Drug dependence is when you continue to use drugs, even when you know the risks. Polysubstance abuse can damage your heart, brain, lungs, liver, and gastrointestinal tract. You continue even when it causes problems with work, school, or relationships. You may have difficulty finding or keeping a job because of your drug dependence.       Drug tolerance is when you need to use more drugs, or use them more often, to get the effects you want. You may not be able to stop using the drugs. When you try to stop, you may have withdrawal symptoms and strong cravings for the drugs.      Drug overdose can occur when you take more drugs than your body can handle. This may be a small amount or a large amount. You can lose consciousness or have a seizure or stroke. Your heart can stop beating, or you can stop breathing. You may die from a drug overdose.     Medicines:     Withdrawal medicines may be given according to the types of drugs you are abusing. Withdrawal from drugs can cause serious, life-threatening side effects. Certain medicines can help decrease your withdrawal symptoms and your desire for the drug. Ask for more information about the withdrawal medicines you may need.       Mood stabilizers may be given to help prevent or treat depression or anxiety caused by drug abuse and withdrawal.       Take your medicine as directed. Contact your healthcare provider if you think your medicine is not helping or if you have side effects. Tell him or her if you are allergic to any medicine. Keep a list of the medicines, vitamins, and herbs you take. Include the amounts, and when and why you take them. Bring the list or the pill bottles to follow-up visits. Carry your medicine list with you in case of an emergency.    Follow up with your healthcare provider as directed: You may be referred to a specialist to treat health conditions caused by your drug use. This includes mental health, heart, or lung specialists. Write down your questions so you remember to ask them during your visits.     Therapy: You may need therapy and support to stop using drugs:     Cognitive and behavioral therapy helps you change your thinking and behavior. It can help you develop plans to avoid the situations that make you want to use drugs. It also helps you cope with the feelings of wanting to use drugs. You may have individual or group therapy.       Contingency management helps you set drug-free goals with a therapist. You will decide ways to celebrate your success when you reach a goal.       Family therapy and support groups allow you and your family members to talk to and be encouraged by other people affected by drug abuse. You and your family members may attend together or separately. Ask your healthcare provider for information about programs in your area.     How polysubstance abuse affects unborn or  babies:     If you are pregnant or get pregnant while using drugs, you may have a miscarriage or give birth early. Your baby may be born addicted to the drugs.      Do not breastfeed your baby if you use drugs. Drugs pass from your bloodstream into your breast milk and affect your baby's health. Talk with your healthcare provider if you are using drugs and breastfeeding.    For support and more information:     Alcoholics Anonymous  Web Address: http://www.aa.org      National Clearinghouse on Drug and Alcohol Information  Phone: 4-482-8160527  Web Address: www.health.org      [employer] [place] on Alcoholism and Drug Dependence  [address] [place], [place] [address]  Phone: 1-872.269.5552  Phone: 1-616.554.8363  Web Address: http://www.ncadd.org

## 2020-01-11 NOTE — ED PROVIDER NOTE - OBJECTIVE STATEMENT
35 y/o M undomiciled smoker w/hx polysubstance abuse arrives intoxicated admitted to crack/cocaine use, requesting food and a place to sleep, no pain, sob, or n/v. Interested in detox information.

## 2020-01-11 NOTE — ED PROVIDER NOTE - PHYSICAL EXAMINATION
General: lethargic, arousable to touch, smells of urine/disheveled  Head: NCAT  Eyes: PERRL  Heart: RRR  Lungs: CTAB  Abd: soft, NTND  Neuro: moves all 4 extremities equally  Skin: no e/o lacerations, abrasions, or ecchymoses

## 2020-01-11 NOTE — ED PROVIDER NOTE - PATIENT PORTAL LINK FT
You can access the FollowMyHealth Patient Portal offered by [facility] by registering at the following website: [email or web address]. By joining [employer]’s FollowMyHealth portal, you will also be able to view your health information using other applications (apps) compatible with our system.

## 2020-01-11 NOTE — ED PROVIDER NOTE - CLINICAL SUMMARY MEDICAL DECISION MAKING FREE TEXT BOX
Given food, allowed to sleep for 2hrs, clinically sober at time of discharge, neuro intact w/no evidence of trauma. d/c home with resources for detox centers.

## 2020-01-11 NOTE — ED ADULT NURSE NOTE - NSIMPLEMENTINTERV_GEN_ALL_ED
Implemented All Fall Risk Interventions:  [place] to call system. Call bell, personal items and telephone within reach. Instruct patient to call for assistance. Room bathroom lighting operational. Non-slip footwear when patient is off stretcher. Physically safe environment: no spills, clutter or unnecessary equipment. Stretcher in lowest position, wheels locked, appropriate side rails in place. Provide visual cue, wrist band, yellow gown, etc. Monitor gait and stability. Monitor for mental status changes and reorient to person, place, and time. Review medications for side effects contributing to fall risk. Reinforce activity limits and safety measures with patient and family.

## 2020-01-11 NOTE — ED ADULT TRIAGE NOTE - CHIEF COMPLAINT QUOTE
walk in pt requesting a 'medical exam.' States he's been doing a lot of cocaine and crack, 'my lungs hurt.' Denies chest pain or sob. Tachy at triage 128bpm. Currently aox4.

## 2020-01-15 DIAGNOSIS — F14.129 COCAINE ABUSE WITH INTOXICATION, UNSPECIFIED: ICD-10-CM

## 2020-01-15 DIAGNOSIS — Z79.2 LONG TERM (CURRENT) USE OF ANTIBIOTICS: ICD-10-CM

## 2020-01-15 DIAGNOSIS — I10 ESSENTIAL (PRIMARY) HYPERTENSION: ICD-10-CM

## 2020-01-15 DIAGNOSIS — Z79.899 OTHER LONG TERM (CURRENT) DRUG THERAPY: ICD-10-CM

## 2020-01-15 DIAGNOSIS — F17.200 NICOTINE DEPENDENCE, UNSPECIFIED, UNCOMPLICATED: ICD-10-CM

## 2020-08-05 ENCOUNTER — EMERGENCY (EMERGENCY)
Facility: HOSPITAL | Age: 35
LOS: 1 days | Discharge: HOME | End: 2020-08-05
Admitting: EMERGENCY MEDICINE
Payer: MEDICAID

## 2020-08-05 VITALS
SYSTOLIC BLOOD PRESSURE: 136 MMHG | RESPIRATION RATE: 18 BRPM | DIASTOLIC BLOOD PRESSURE: 83 MMHG | WEIGHT: 145.06 LBS | HEART RATE: 71 BPM | TEMPERATURE: 97 F | OXYGEN SATURATION: 97 %

## 2020-08-05 VITALS
SYSTOLIC BLOOD PRESSURE: 145 MMHG | OXYGEN SATURATION: 94 % | DIASTOLIC BLOOD PRESSURE: 79 MMHG | HEART RATE: 64 BPM | RESPIRATION RATE: 18 BRPM

## 2020-08-05 LAB
ALBUMIN SERPL ELPH-MCNC: 2.9 G/DL — LOW (ref 3.4–5)
ALP SERPL-CCNC: 73 U/L — SIGNIFICANT CHANGE UP (ref 40–120)
ALT FLD-CCNC: 26 U/L — SIGNIFICANT CHANGE UP (ref 12–42)
ANION GAP SERPL CALC-SCNC: 5 MMOL/L — LOW (ref 9–16)
APPEARANCE UR: CLEAR — SIGNIFICANT CHANGE UP
APTT BLD: 30.4 SEC — SIGNIFICANT CHANGE UP (ref 27.5–35.5)
AST SERPL-CCNC: 24 U/L — SIGNIFICANT CHANGE UP (ref 15–37)
BASOPHILS # BLD AUTO: 0.04 K/UL — SIGNIFICANT CHANGE UP (ref 0–0.2)
BASOPHILS NFR BLD AUTO: 0.4 % — SIGNIFICANT CHANGE UP (ref 0–2)
BILIRUB SERPL-MCNC: 0.2 MG/DL — SIGNIFICANT CHANGE UP (ref 0.2–1.2)
BILIRUB UR-MCNC: NEGATIVE — SIGNIFICANT CHANGE UP
BUN SERPL-MCNC: 14 MG/DL — SIGNIFICANT CHANGE UP (ref 7–23)
CALCIUM SERPL-MCNC: 8.8 MG/DL — SIGNIFICANT CHANGE UP (ref 8.5–10.5)
CHLORIDE SERPL-SCNC: 103 MMOL/L — SIGNIFICANT CHANGE UP (ref 96–108)
CO2 SERPL-SCNC: 31 MMOL/L — SIGNIFICANT CHANGE UP (ref 22–31)
COLOR SPEC: YELLOW — SIGNIFICANT CHANGE UP
CREAT SERPL-MCNC: 0.77 MG/DL — SIGNIFICANT CHANGE UP (ref 0.5–1.3)
DIFF PNL FLD: NEGATIVE — SIGNIFICANT CHANGE UP
EOSINOPHIL # BLD AUTO: 0.17 K/UL — SIGNIFICANT CHANGE UP (ref 0–0.5)
EOSINOPHIL NFR BLD AUTO: 1.8 % — SIGNIFICANT CHANGE UP (ref 0–6)
ETHANOL SERPL-MCNC: <3 MG/DL — SIGNIFICANT CHANGE UP
GLUCOSE SERPL-MCNC: 114 MG/DL — HIGH (ref 70–99)
GLUCOSE UR QL: NEGATIVE — SIGNIFICANT CHANGE UP
HCT VFR BLD CALC: 38.5 % — LOW (ref 39–50)
HGB BLD-MCNC: 12.7 G/DL — LOW (ref 13–17)
IMM GRANULOCYTES NFR BLD AUTO: 0.6 % — SIGNIFICANT CHANGE UP (ref 0–1.5)
INR BLD: 0.96 — SIGNIFICANT CHANGE UP (ref 0.88–1.16)
KETONES UR-MCNC: NEGATIVE — SIGNIFICANT CHANGE UP
LEUKOCYTE ESTERASE UR-ACNC: NEGATIVE — SIGNIFICANT CHANGE UP
LYMPHOCYTES # BLD AUTO: 3.38 K/UL — HIGH (ref 1–3.3)
LYMPHOCYTES # BLD AUTO: 35 % — SIGNIFICANT CHANGE UP (ref 13–44)
MAGNESIUM SERPL-MCNC: 1.8 MG/DL — SIGNIFICANT CHANGE UP (ref 1.6–2.6)
MCHC RBC-ENTMCNC: 30.5 PG — SIGNIFICANT CHANGE UP (ref 27–34)
MCHC RBC-ENTMCNC: 33 GM/DL — SIGNIFICANT CHANGE UP (ref 32–36)
MCV RBC AUTO: 92.3 FL — SIGNIFICANT CHANGE UP (ref 80–100)
MONOCYTES # BLD AUTO: 0.76 K/UL — SIGNIFICANT CHANGE UP (ref 0–0.9)
MONOCYTES NFR BLD AUTO: 7.9 % — SIGNIFICANT CHANGE UP (ref 2–14)
NEUTROPHILS # BLD AUTO: 5.26 K/UL — SIGNIFICANT CHANGE UP (ref 1.8–7.4)
NEUTROPHILS NFR BLD AUTO: 54.3 % — SIGNIFICANT CHANGE UP (ref 43–77)
NITRITE UR-MCNC: NEGATIVE — SIGNIFICANT CHANGE UP
NRBC # BLD: 0 /100 WBCS — SIGNIFICANT CHANGE UP (ref 0–0)
PCP SPEC-MCNC: SIGNIFICANT CHANGE UP
PH UR: 6 — SIGNIFICANT CHANGE UP (ref 5–8)
PLATELET # BLD AUTO: 352 K/UL — SIGNIFICANT CHANGE UP (ref 150–400)
POTASSIUM SERPL-MCNC: 4.4 MMOL/L — SIGNIFICANT CHANGE UP (ref 3.5–5.3)
POTASSIUM SERPL-SCNC: 4.4 MMOL/L — SIGNIFICANT CHANGE UP (ref 3.5–5.3)
PROT SERPL-MCNC: 6.6 G/DL — SIGNIFICANT CHANGE UP (ref 6.4–8.2)
PROT UR-MCNC: NEGATIVE MG/DL — SIGNIFICANT CHANGE UP
PROTHROM AB SERPL-ACNC: 11.4 SEC — SIGNIFICANT CHANGE UP (ref 10.6–13.6)
RBC # BLD: 4.17 M/UL — LOW (ref 4.2–5.8)
RBC # FLD: 14.5 % — SIGNIFICANT CHANGE UP (ref 10.3–14.5)
SODIUM SERPL-SCNC: 139 MMOL/L — SIGNIFICANT CHANGE UP (ref 132–145)
SP GR SPEC: 1.02 — SIGNIFICANT CHANGE UP (ref 1–1.03)
UROBILINOGEN FLD QL: 0.2 E.U./DL — SIGNIFICANT CHANGE UP
WBC # BLD: 9.67 K/UL — SIGNIFICANT CHANGE UP (ref 3.8–10.5)
WBC # FLD AUTO: 9.67 K/UL — SIGNIFICANT CHANGE UP (ref 3.8–10.5)

## 2020-08-05 PROCEDURE — 93010 ELECTROCARDIOGRAM REPORT: CPT

## 2020-08-05 PROCEDURE — 70450 CT HEAD/BRAIN W/O DYE: CPT | Mod: 26

## 2020-08-05 PROCEDURE — 99285 EMERGENCY DEPT VISIT HI MDM: CPT

## 2020-08-05 RX ORDER — SODIUM CHLORIDE 9 MG/ML
1000 INJECTION INTRAMUSCULAR; INTRAVENOUS; SUBCUTANEOUS ONCE
Refills: 0 | Status: COMPLETED | OUTPATIENT
Start: 2020-08-05 | End: 2020-08-05

## 2020-08-05 RX ADMIN — SODIUM CHLORIDE 1000 MILLILITER(S): 9 INJECTION INTRAMUSCULAR; INTRAVENOUS; SUBCUTANEOUS at 12:28

## 2020-08-05 NOTE — ED PROVIDER NOTE - CLINICAL SUMMARY MEDICAL DECISION MAKING FREE TEXT BOX
36 y/o M presents to ED with seizure.  Pt well appearing, vSS, NAD.  NO signs of alcohol withdrawal, no tremor or other concerns. 34 y/o M presents to ED with seizure.  Pt well appearing, vSS, NAD.  NO signs of alcohol withdrawal, no tremor or other concerns.  Pt does not described post ictal phase.  No tongue biting and no urinary incontinence.  Low suspicion for true seizure.  CT head negative.  Utox positive for opiates, THC, benzos.  Alcohol zero.  CIWA score zero.

## 2020-08-05 NOTE — ED PROVIDER NOTE - OBJECTIVE STATEMENT
34 y/o M with PMH of anxiety, bipolar d/o, depression, drug abuse, alcohol abuse presents to ED after reported seizure.  Pt states he had a seizure this morning and now feels unwell.  He states he has had seizures in the past when he has stopped drinking alcohol abruptly.  He last drink alcohol yesterday but cannot recall what time.  He also admits to snorting heroin, apprxo 4-5 bags, yesterday. He is now itching all over and states "maybe I feel bad from the heroin".  He endorses falling from the seizure and came to immediately.  Pt denies fevers/chills, neck or back pain, headache, visual changes, sore throat, chest pain, palpitations, cough, SOB, abd pain, n/v/d, dysuria, hematuria, weakness, dizziness, numbness, lower extremity swelling, rash, sick contacts, recent hospitalizations, recent travels. 34 y/o M with PMH of anxiety, bipolar d/o, depression, drug abuse, alcohol abuse presents to ED after reported seizure.  Pt states he had a seizure this morning and now feels unwell.  He states he has had seizures in the past when he has stopped drinking alcohol abruptly.  He last drink alcohol yesterday but cannot recall what time.  He also admits to snorting heroin, apprxo 4-5 bags, yesterday. He is now itching all over and states "maybe I feel bad from the heroin".  He endorses falling from the seizure and came to immediately.  Pt does not wish to stop drinking but states he did not drink due to running out of money.  Pt denies fevers/chills, neck or back pain, headache, visual changes, sore throat, chest pain, palpitations, cough, SOB, abd pain, n/v/d, dysuria, hematuria, weakness, dizziness, numbness, lower extremity swelling, rash, sick contacts, recent hospitalizations, recent travels.

## 2020-08-05 NOTE — ED ADULT NURSE NOTE - NSIMPLEMENTINTERV_GEN_ALL_ED
Implemented All Fall with Harm Risk Interventions:  [place] to call system. Call bell, personal items and telephone within reach. Instruct patient to call for assistance. Room bathroom lighting operational. Non-slip footwear when patient is off stretcher. Physically safe environment: no spills, clutter or unnecessary equipment. Stretcher in lowest position, wheels locked, appropriate side rails in place. Provide visual cue, wrist band, yellow gown, etc. Monitor gait and stability. Monitor for mental status changes and reorient to person, place, and time. Review medications for side effects contributing to fall risk. Reinforce activity limits and safety measures with patient and family. Provide visual clues: red socks.

## 2020-08-05 NOTE — ED ADULT TRIAGE NOTE - CHIEF COMPLAINT QUOTE
states he may be in withdrawal from alcohol, admits to having a seizure from withdrawal- reports nausea, dizziness- Pt also admits to heroin use and THC. Pt is A+Ox3 ambulates with steady gait

## 2020-08-09 DIAGNOSIS — R56.9 UNSPECIFIED CONVULSIONS: ICD-10-CM

## 2020-08-11 NOTE — ED PROVIDER NOTE - CHIEF COMPLAINT
Balloon inserted to left anterior descending and proximal left anterior descending. The patient is a 31y Male complaining of altered mental status.

## 2020-08-26 NOTE — ED PROVIDER NOTE - CCCP TRG CHIEF CMPLNT
altered mental status Zygomaticofacial Flap Text: Given the location of the defect, shape of the defect and the proximity to free margins a zygomaticofacial flap was deemed most appropriate for repair.  Using a sterile surgical marker, the appropriate flap was drawn incorporating the defect and placing the expected incisions within the relaxed skin tension lines where possible. The area thus outlined was incised deep to adipose tissue with a #15 scalpel blade with preservation of a vascular pedicle.  The skin margins were undermined to an appropriate distance in all directions utilizing iris scissors.  The flap was then placed into the defect and anchored with interrupted buried subcutaneous sutures.

## 2020-09-03 ENCOUNTER — EMERGENCY (EMERGENCY)
Facility: HOSPITAL | Age: 35
LOS: 1 days | Discharge: HOME | End: 2020-09-03
Attending: EMERGENCY MEDICINE | Admitting: EMERGENCY MEDICINE
Payer: MEDICAID

## 2020-09-03 VITALS
OXYGEN SATURATION: 98 % | SYSTOLIC BLOOD PRESSURE: 114 MMHG | DIASTOLIC BLOOD PRESSURE: 73 MMHG | HEART RATE: 139 BPM | TEMPERATURE: 98 F

## 2020-09-03 VITALS
TEMPERATURE: 98 F | SYSTOLIC BLOOD PRESSURE: 133 MMHG | HEART RATE: 71 BPM | RESPIRATION RATE: 18 BRPM | DIASTOLIC BLOOD PRESSURE: 94 MMHG | OXYGEN SATURATION: 98 %

## 2020-09-03 LAB — GLUCOSE BLDC GLUCOMTR-MCNC: 130 MG/DL — HIGH (ref 70–99)

## 2020-09-03 PROCEDURE — 99284 EMERGENCY DEPT VISIT MOD MDM: CPT

## 2020-09-03 PROCEDURE — 93010 ELECTROCARDIOGRAM REPORT: CPT

## 2020-09-03 RX ORDER — FAMOTIDINE 10 MG/ML
20 INJECTION INTRAVENOUS ONCE
Refills: 0 | Status: COMPLETED | OUTPATIENT
Start: 2020-09-03 | End: 2020-09-03

## 2020-09-03 RX ORDER — ONDANSETRON 8 MG/1
4 TABLET, FILM COATED ORAL ONCE
Refills: 0 | Status: COMPLETED | OUTPATIENT
Start: 2020-09-03 | End: 2020-09-03

## 2020-09-03 RX ORDER — SODIUM CHLORIDE 9 MG/ML
1000 INJECTION INTRAMUSCULAR; INTRAVENOUS; SUBCUTANEOUS ONCE
Refills: 0 | Status: COMPLETED | OUTPATIENT
Start: 2020-09-03 | End: 2020-09-03

## 2020-09-03 RX ADMIN — SODIUM CHLORIDE 1000 MILLILITER(S): 9 INJECTION INTRAMUSCULAR; INTRAVENOUS; SUBCUTANEOUS at 14:55

## 2020-09-03 RX ADMIN — ONDANSETRON 4 MILLIGRAM(S): 8 TABLET, FILM COATED ORAL at 14:54

## 2020-09-03 RX ADMIN — Medication 50 MILLIGRAM(S): at 14:55

## 2020-09-03 RX ADMIN — FAMOTIDINE 20 MILLIGRAM(S): 10 INJECTION INTRAVENOUS at 14:54

## 2020-09-03 NOTE — ED PROVIDER NOTE - CLINICAL SUMMARY MEDICAL DECISION MAKING FREE TEXT BOX
feeling better, tolerated PO after GI cocktail and IVF, sx completely resolved, abd soft NT ND no guarding no rebound, stable for dc home. given outpatient detox resources

## 2020-09-03 NOTE — ED PROVIDER NOTE - OBJECTIVE STATEMENT
35 y.o. male here with c/o N/V/D unable to tolerates PO, states he is withdrawing from heroin and alcohol, last use < 24 hours ago. Requesting detox referral. CIWA 8

## 2020-09-03 NOTE — ED ADULT TRIAGE NOTE - CHIEF COMPLAINT QUOTE
pt. picked up from the street c/o withdrawing from heroine and alcohol. Pt. in no distress in triage, no tremors noted.

## 2020-09-03 NOTE — ED PROVIDER NOTE - PATIENT PORTAL LINK FT
You can access the FollowMyHealth Patient Portal offered by [facility] by registering at the following website: [email or web address]. By joining [employer]’s FollowMyHealth portal, you will also be able to view your health information using other applications (apps) compatible with our system.

## 2020-09-07 DIAGNOSIS — F10.239 ALCOHOL DEPENDENCE WITH WITHDRAWAL, UNSPECIFIED: ICD-10-CM

## 2020-09-07 DIAGNOSIS — F11.23 OPIOID DEPENDENCE WITH WITHDRAWAL: ICD-10-CM

## 2020-12-11 ENCOUNTER — EMERGENCY (EMERGENCY)
Facility: HOSPITAL | Age: 35
LOS: 1 days | Discharge: HOME | End: 2020-12-11
Attending: EMERGENCY MEDICINE | Admitting: EMERGENCY MEDICINE
Payer: MEDICAID

## 2020-12-11 VITALS
OXYGEN SATURATION: 98 % | TEMPERATURE: 98 F | DIASTOLIC BLOOD PRESSURE: 68 MMHG | RESPIRATION RATE: 16 BRPM | HEART RATE: 100 BPM | SYSTOLIC BLOOD PRESSURE: 114 MMHG | WEIGHT: 119.93 LBS | HEIGHT: 63 IN

## 2020-12-11 DIAGNOSIS — F10.129 ALCOHOL ABUSE WITH INTOXICATION, UNSPECIFIED: ICD-10-CM

## 2020-12-11 DIAGNOSIS — R41.82 ALTERED MENTAL STATUS, UNSPECIFIED: ICD-10-CM

## 2020-12-11 LAB — GLUCOSE BLDC GLUCOMTR-MCNC: 93 MG/DL — SIGNIFICANT CHANGE UP (ref 70–99)

## 2020-12-11 PROCEDURE — 99284 EMERGENCY DEPT VISIT MOD MDM: CPT

## 2020-12-11 NOTE — ED ADULT NURSE NOTE - CHIEF COMPLAINT QUOTE
BIBA from  for AMS. pt admits to drinking vodka. no obvious signs of injury/trauma noted.  in the field.

## 2020-12-11 NOTE — ED ADULT NURSE NOTE - OBJECTIVE STATEMENT
Pt hereu5 Pt here for ams.  Pt  to tactile and verbal Pt here for ams.  Pt responsive to tactile and verbal stimuli. Admits to drinking alcohol.  No visible signs of trauma noted.

## 2020-12-11 NOTE — ED ADULT NURSE REASSESSMENT NOTE - NS ED NURSE REASSESS COMMENT FT1
Pt received from [facility]. Pt asleep in stretcher, breathing spontaneous and unlabored. Pt arousable to tactile stimuli, denies pain and injuries. Pt admits to alcohol use, denies other substance use. Pt states "I am fine, I just want to sleep."

## 2020-12-11 NOTE — ED ADULT TRIAGE NOTE - CHIEF COMPLAINT QUOTE
BIBA from [facility] for AMS. pt admits to drinking vodka. no obvious signs of injury/trauma noted.  in the field.

## 2020-12-11 NOTE — ED PROVIDER NOTE - PATIENT PORTAL LINK FT
You can access the FollowMyHealth Patient Portal offered by [facility] by registering at the following website: [email or web address]. By joining [employer]’s FollowMyHealth portal, you will also be able to view your health information using other applications (apps) compatible with our system.

## 2020-12-12 ENCOUNTER — EMERGENCY (EMERGENCY)
Facility: HOSPITAL | Age: 35
LOS: 1 days | Discharge: HOME | End: 2020-12-12
Attending: EMERGENCY MEDICINE | Admitting: EMERGENCY MEDICINE
Payer: MEDICAID

## 2020-12-12 VITALS
OXYGEN SATURATION: 98 % | HEIGHT: 63 IN | RESPIRATION RATE: 18 BRPM | DIASTOLIC BLOOD PRESSURE: 70 MMHG | SYSTOLIC BLOOD PRESSURE: 116 MMHG | WEIGHT: 134.92 LBS | TEMPERATURE: 98 F | HEART RATE: 80 BPM

## 2020-12-12 VITALS
DIASTOLIC BLOOD PRESSURE: 74 MMHG | OXYGEN SATURATION: 99 % | RESPIRATION RATE: 18 BRPM | SYSTOLIC BLOOD PRESSURE: 123 MMHG | HEART RATE: 83 BPM | TEMPERATURE: 98 F

## 2020-12-12 DIAGNOSIS — F10.20 ALCOHOL DEPENDENCE, UNCOMPLICATED: ICD-10-CM

## 2020-12-12 DIAGNOSIS — R41.82 ALTERED MENTAL STATUS, UNSPECIFIED: ICD-10-CM

## 2020-12-12 DIAGNOSIS — Z20.828 CONTACT WITH AND (SUSPECTED) EXPOSURE TO OTHER VIRAL COMMUNICABLE DISEASES: ICD-10-CM

## 2020-12-12 DIAGNOSIS — F10.129 ALCOHOL ABUSE WITH INTOXICATION, UNSPECIFIED: ICD-10-CM

## 2020-12-12 DIAGNOSIS — R45.851 SUICIDAL IDEATIONS: ICD-10-CM

## 2020-12-12 LAB
ALBUMIN SERPL ELPH-MCNC: 4.2 G/DL — SIGNIFICANT CHANGE UP (ref 3.3–5)
ALP SERPL-CCNC: 56 U/L — SIGNIFICANT CHANGE UP (ref 40–120)
ALT FLD-CCNC: 16 U/L — SIGNIFICANT CHANGE UP (ref 10–45)
AMPHET UR-MCNC: NEGATIVE — SIGNIFICANT CHANGE UP
ANION GAP SERPL CALC-SCNC: 14 MMOL/L — SIGNIFICANT CHANGE UP (ref 5–17)
APPEARANCE UR: CLEAR — SIGNIFICANT CHANGE UP
AST SERPL-CCNC: 15 U/L — SIGNIFICANT CHANGE UP (ref 10–40)
BARBITURATES UR SCN-MCNC: NEGATIVE — SIGNIFICANT CHANGE UP
BASOPHILS # BLD AUTO: 0.06 K/UL — SIGNIFICANT CHANGE UP (ref 0–0.2)
BASOPHILS NFR BLD AUTO: 0.6 % — SIGNIFICANT CHANGE UP (ref 0–2)
BENZODIAZ UR-MCNC: NEGATIVE — SIGNIFICANT CHANGE UP
BILIRUB SERPL-MCNC: 0.4 MG/DL — SIGNIFICANT CHANGE UP (ref 0.2–1.2)
BILIRUB UR-MCNC: NEGATIVE — SIGNIFICANT CHANGE UP
BUN SERPL-MCNC: 15 MG/DL — SIGNIFICANT CHANGE UP (ref 7–23)
CALCIUM SERPL-MCNC: 9.4 MG/DL — SIGNIFICANT CHANGE UP (ref 8.4–10.5)
CHLORIDE SERPL-SCNC: 99 MMOL/L — SIGNIFICANT CHANGE UP (ref 96–108)
CO2 SERPL-SCNC: 27 MMOL/L — SIGNIFICANT CHANGE UP (ref 22–31)
COCAINE METAB.OTHER UR-MCNC: NEGATIVE — SIGNIFICANT CHANGE UP
COLOR SPEC: YELLOW — SIGNIFICANT CHANGE UP
CREAT SERPL-MCNC: 0.53 MG/DL — SIGNIFICANT CHANGE UP (ref 0.5–1.3)
DIFF PNL FLD: NEGATIVE — SIGNIFICANT CHANGE UP
EOSINOPHIL # BLD AUTO: 0.2 K/UL — SIGNIFICANT CHANGE UP (ref 0–0.5)
EOSINOPHIL NFR BLD AUTO: 1.9 % — SIGNIFICANT CHANGE UP (ref 0–6)
ETHANOL SERPL-MCNC: 57 MG/DL — HIGH (ref 0–10)
GLUCOSE SERPL-MCNC: 97 MG/DL — SIGNIFICANT CHANGE UP (ref 70–99)
GLUCOSE UR QL: NEGATIVE — SIGNIFICANT CHANGE UP
HCT VFR BLD CALC: 40.6 % — SIGNIFICANT CHANGE UP (ref 39–50)
HGB BLD-MCNC: 13.1 G/DL — SIGNIFICANT CHANGE UP (ref 13–17)
HIV 1+2 AB+HIV1 P24 AG SERPL QL IA: SIGNIFICANT CHANGE UP
IMM GRANULOCYTES NFR BLD AUTO: 1 % — SIGNIFICANT CHANGE UP (ref 0–1.5)
KETONES UR-MCNC: NEGATIVE — SIGNIFICANT CHANGE UP
LEUKOCYTE ESTERASE UR-ACNC: NEGATIVE — SIGNIFICANT CHANGE UP
LYMPHOCYTES # BLD AUTO: 3.51 K/UL — HIGH (ref 1–3.3)
LYMPHOCYTES # BLD AUTO: 34.2 % — SIGNIFICANT CHANGE UP (ref 13–44)
MCHC RBC-ENTMCNC: 29.6 PG — SIGNIFICANT CHANGE UP (ref 27–34)
MCHC RBC-ENTMCNC: 32.3 GM/DL — SIGNIFICANT CHANGE UP (ref 32–36)
MCV RBC AUTO: 91.6 FL — SIGNIFICANT CHANGE UP (ref 80–100)
METHADONE UR-MCNC: NEGATIVE — SIGNIFICANT CHANGE UP
MONOCYTES # BLD AUTO: 0.9 K/UL — SIGNIFICANT CHANGE UP (ref 0–0.9)
MONOCYTES NFR BLD AUTO: 8.8 % — SIGNIFICANT CHANGE UP (ref 2–14)
NEUTROPHILS # BLD AUTO: 5.5 K/UL — SIGNIFICANT CHANGE UP (ref 1.8–7.4)
NEUTROPHILS NFR BLD AUTO: 53.5 % — SIGNIFICANT CHANGE UP (ref 43–77)
NITRITE UR-MCNC: NEGATIVE — SIGNIFICANT CHANGE UP
NRBC # BLD: 0 /100 WBCS — SIGNIFICANT CHANGE UP (ref 0–0)
OPIATES UR-MCNC: NEGATIVE — SIGNIFICANT CHANGE UP
PCP SPEC-MCNC: SIGNIFICANT CHANGE UP
PCP UR-MCNC: NEGATIVE — SIGNIFICANT CHANGE UP
PH UR: 6.5 — SIGNIFICANT CHANGE UP (ref 5–8)
PLATELET # BLD AUTO: 392 K/UL — SIGNIFICANT CHANGE UP (ref 150–400)
POTASSIUM SERPL-MCNC: 3.6 MMOL/L — SIGNIFICANT CHANGE UP (ref 3.5–5.3)
POTASSIUM SERPL-SCNC: 3.6 MMOL/L — SIGNIFICANT CHANGE UP (ref 3.5–5.3)
PROT SERPL-MCNC: 6.9 G/DL — SIGNIFICANT CHANGE UP (ref 6–8.3)
PROT UR-MCNC: NEGATIVE MG/DL — SIGNIFICANT CHANGE UP
RBC # BLD: 4.43 M/UL — SIGNIFICANT CHANGE UP (ref 4.2–5.8)
RBC # FLD: 14.1 % — SIGNIFICANT CHANGE UP (ref 10.3–14.5)
SARS-COV-2 RNA SPEC QL NAA+PROBE: SIGNIFICANT CHANGE UP
SODIUM SERPL-SCNC: 140 MMOL/L — SIGNIFICANT CHANGE UP (ref 135–145)
SP GR SPEC: 1.01 — SIGNIFICANT CHANGE UP (ref 1–1.03)
THC UR QL: NEGATIVE — SIGNIFICANT CHANGE UP
UROBILINOGEN FLD QL: 0.2 E.U./DL — SIGNIFICANT CHANGE UP
WBC # BLD: 10.27 K/UL — SIGNIFICANT CHANGE UP (ref 3.8–10.5)
WBC # FLD AUTO: 10.27 K/UL — SIGNIFICANT CHANGE UP (ref 3.8–10.5)

## 2020-12-12 PROCEDURE — 85025 COMPLETE CBC W/AUTO DIFF WBC: CPT

## 2020-12-12 PROCEDURE — 87389 HIV-1 AG W/HIV-1&-2 AB AG IA: CPT

## 2020-12-12 PROCEDURE — 81003 URINALYSIS AUTO W/O SCOPE: CPT

## 2020-12-12 PROCEDURE — 80307 DRUG TEST PRSMV CHEM ANLYZR: CPT

## 2020-12-12 PROCEDURE — 99284 EMERGENCY DEPT VISIT MOD MDM: CPT

## 2020-12-12 PROCEDURE — 80053 COMPREHEN METABOLIC PANEL: CPT

## 2020-12-12 PROCEDURE — U0003: CPT

## 2020-12-12 PROCEDURE — 99285 EMERGENCY DEPT VISIT HI MDM: CPT

## 2020-12-12 PROCEDURE — 82962 GLUCOSE BLOOD TEST: CPT

## 2020-12-12 PROCEDURE — 36415 COLL VENOUS BLD VENIPUNCTURE: CPT

## 2020-12-12 NOTE — ED PROVIDER NOTE - NSFOLLOWUPCLINICS_GEN_ALL_ED_FT
[facility]  Family Medicine  [address]  [place], [place] [address]  Phone: [phone]  Fax:   Follow Up Time: 7-10 Days

## 2020-12-12 NOTE — ED BEHAVIORAL HEALTH ASSESSMENT NOTE - DESCRIPTION
In the ED patient noted to be unkempt, briefly agitated with a tech but redirectable.  Did not require any PRNs.  VS T 98.1, HR 80, /70, SpO2 98%.  U tox negative.  BAL 57.  Other serologies unremarkable. denies significant PMH see HPI

## 2020-12-12 NOTE — ED ADULT NURSE NOTE - OBJECTIVE STATEMENT
36 y/o male received into the ED, axox3, stating that he came to the ER because he is feeling depressed and suicidal.  Pt. denies having a plan to hurt himself at this time.  Pt. reports having a history of depression.  Pt. does admit to drinking alcohol today but denies any drug use.  Pt. noted to have poor hygiene.  Pt. is calm and cooperative with medical staff.  Pt. was dressed in hospital gown and socks.  All belongings were bagged, labeled and checked by security.  Pt. was wanded by security as well.  Pt. immediately placed on a one to one for safety observation.  Will continue to monitor.

## 2020-12-12 NOTE — ED PROVIDER NOTE - OBJECTIVE STATEMENT
34 y/o male with hx of etoh abuse c/o etoh intox and si. pt states he has been feeling depressed and SI. no plan. pt states he was admitted for psych in past but unable to provide which institution. Pt notes last drink a " couple of hrs" prior to coming to ED. pt denies drug use. pt seen last night at [facility] for intox. Pt states he hears voices every now  and then telling him to hurt himself. no HI or VH. no fever or chills. no cp, sob, n/v/d.

## 2020-12-12 NOTE — ED ADULT TRIAGE NOTE - CHIEF COMPLAINT QUOTE
35 y /o male BIBEMS for evaluation, found sleeping on the street, pt reports that he has been drinking.

## 2020-12-12 NOTE — ED BEHAVIORAL HEALTH ASSESSMENT NOTE - RISK ASSESSMENT
Patient is at chronically elevated risk of harm to self given homelessness, active heavy substance use, h/o suicide attempts, h/o mood d/o, treatment nonadherence.  Acute risk mitigated by denial of suicidal intent/plan, good behavioral control, and is further mitigated by safety planning and referral for outpt substance use care.  He is someone whose chronically elevated risk is not acutely above b/l, would not be  significantly modifiable by an inpatient admission, and he is psychiatrically appropriate for d/c. Low Acute Suicide Risk

## 2020-12-12 NOTE — ED PROVIDER NOTE - CLINICAL SUMMARY MEDICAL DECISION MAKING FREE TEXT BOX
etoh intox and ? si. pt well appearing. vss.  labs noted.  pt evaluated in ED by psych and pt denies psych complaints at that time and cleared for d/c. fu with pmd. return precautions d/w pt. d/w pt covid testing pending and to self isolate until he gets results.

## 2020-12-12 NOTE — ED BEHAVIORAL HEALTH NOTE - BEHAVIORAL HEALTH NOTE
===================  PRE-HOSPITAL COURSE  ===================  SOURCE: ED triage note   DETAILS: BIBEMS for evaluation, found sleeping on the street, pt reports that he has been drinking,  altered mental status    ============  ED COURSE   ============  SOURCE: ED RN  ARRIVAL: BIB EMS  BELONGINGS: Belongings were searched and secured  BEHAVIOR: Initially when he came in he was calm and cooperative then tried to get violent with a pca, he said it was to get more food and attention. Code Nae[place] was called but no medications given. He initially was denying any suicidal ideations and then changed and said he was suicidal with no plan or intent. He was very dirty, disheveled. For the nurse he was following instructions/directions. He accepted and received sandwiches  TREATMENT:   None  VISITORS:  None    ========================  FOR EACH COLLATERAL-   ========================  NAME: Arnel Wayne  NUMBER: [phone]  RELATIONSHIP: Mother   RELIABILITY: Reliability is poor. Mother has not seen him or spoken to him in over 1 yr. Patient left the home in [place], [place] 4 yrs ago and mother does not know where he went.  COMMENTS: Mother was not able to provide any significant information.    COVID Exposure Screen- collateral (i.e. third-party, chart review, belongings, etc; include EMS and ED staff)     1.        *Has the patient had a COVID-19 test in the last 21 days?  (  ) Yes   (  ) No   (x  ) Unknown- Reason: patient homeless, poor historian, family has not seen in   IF YES PROCEED TO QUESTION #2. IF NO OR UNKNOWN THEN PLEASE SKIP TO QUESTION #3.  2.        Date of test: ________  3.        Do you know the result? (  ) Negative   (  ) Positive   (  ) No result available  4.        *In the past 14 days, has the patient been around anyone with a positive COVID-19 test?*  (  ) Yes   (  ) No   ( x ) Unknown- Reason (e.g. collateral uncertain, refusing to answer, etc.): patient is homeless, unclear who he has been in contact with  IF YES PROCEED TO QUESTION #5. IF NO or UNKNOWN, PLEASE SKIP TO QUESTION #10  5.        Was the patient within 6 feet of them for at least 15 minutes? (  ) Yes   (  ) No   (  ) Unknown- Reason: ______   6.        Did the patient provide care for them? (  ) Yes   (  ) No   (  ) Unknown- Reason: ______   7.        Did the patient have direct physical contact with them (touched, hugged, or kissed them)? (  ) Yes   (  ) No    (  ) Unknown- Reason: ______   8.        Did the patient share eating or drinking utensils with them? (  ) Yes   (  ) No    (  ) Unknown- Reason: ______   9.        Have they sneezed, coughed, or somehow got respiratory droplets on the patient? (  ) Yes   (  ) No    (  ) Unknown- Reason: ______   10.     *Have you been out of [place] within the past 14 days?*  (  ) Yes   (  ) No   (  x) Unknown- Reason (e.g. patient uncertain, sedated, refusing to answer, etc.): poor historian  IF YES PLEASE ANSWER THE FOLLOWING QUESTIONS:  11.     Which state/country have you been to? ______  12.     Were you there over 24 hours? (  ) Yes   (  ) No    (  ) Unknown- Reason: ______  13.     Date of return to [facility]: ______

## 2020-12-12 NOTE — ED PROVIDER NOTE - PATIENT PORTAL LINK FT
You can access the FollowMyHealth Patient Portal offered by [facility] by registering at the following website: [email or web address]. By joining [employer]’s FollowMyHealth portal, you will also be able to view your health information using other applications (apps) compatible with our system.

## 2020-12-12 NOTE — ED PROVIDER NOTE - NSFOLLOWUPINSTRUCTIONS_ED_ALL_ED_FT
Follow up with your physician this week.                     Abuse of Alcohol    WHAT YOU NEED TO KNOW:    Alcohol abuse means you drink more than the recommended daily or weekly limits. You may be drinking alcohol regularly or drinking large amounts in a short period of time (binge drinking). You continue to drink even though it causes legal, work, or relationship problems.    DISCHARGE INSTRUCTIONS:    Call your local emergency number (911 in the ) for any of the following:   •You have sudden chest pain or trouble breathing.      •You want to harm yourself or others.      •You have a seizure or have shaking or trembling.      Call your doctor if:   •You have hallucinations (you see or hear things that are not real).      •You cannot stop vomiting or you vomit blood.      •You need help to stop drinking alcohol.       •You have questions or concerns about your condition or care.      Medicines:   •Vitamin supplements may be given to treat low vitamin levels. Alcohol can make it hard for your body to absorb enough vitamins such as B1. Vitamin supplements may also be given to prevent alcohol related brain damage.       •Take your medicine as directed. Contact your healthcare provider if you think your medicine is not helping or if you have side effects. Tell him or her if you are allergic to any medicine. Keep a list of the medicines, vitamins, and herbs you take. Include the amounts, and when and why you take them. Bring the list or the pill bottles to follow-up visits. Carry your medicine list with you in case of an emergency.      Health problems alcohol abuse can cause:   •Cancer in your liver, pancreas, stomach, colon, kidney, or breast      •Stroke or a heart attack      •Liver, kidney, or lung disease      •Blackouts, memory loss, brain damage, or dementia      •Diabetes, immune system problems, or thiamine (vitamin B1) deficiency      •Problems for you and your baby if you drink while pregnant      Recommended alcohol limits:   •Men 21 to 64 years should limit alcohol to 2 drinks a day. Do not have more than 4 drinks in 1 day or more than 14 in 1 week.      •All women, and men 65 or older should limit alcohol to 1 drink in a day. Do not have more than 3 drinks in 1 day or more than 7 in 1 week. No amount of alcohol is okay during pregnancy.      Manage alcohol use:   •Decrease the amount you drink. This can help prevent health problems such as brain, heart, and liver damage, high blood pressure, diabetes, and cancer. If you cannot stop completely, healthcare providers can help you set goals to decrease the amount you drink.      •Plan weekly alcohol use. You will be less likely to drink more than the recommended limit if you plan ahead.      •Have food when you drink alcohol. Food will prevent alcohol from getting into your system too quickly. Eat before you have your first alcohol drink.      •Time your drinks carefully. Have no more than 1 drink in an hour. Have a liquid such as water, coffee, or a soft drink between alcohol drinks.      •Do not drive if you have had alcohol. Make sure someone who has not been drinking can help you get home.      •Do not drink alcohol if you are taking medicine. Alcohol is dangerous when you combine it with certain medicines, such as acetaminophen or blood pressure medicine. Talk to your healthcare provider about all the medicines you currently take.      Follow up with your healthcare provider as directed: Write down your questions so you remember to ask them during your visits.    For support and more information:   •Alcoholics Anonymous  Web Address: http://www.aa.org      •Substance Abuse and Mental Health Services Administration  [address]  [place] [address]  Web Address: http://www.[email or web address].gov           © Copyright [employer] 2020           back to top                          © Copyright [employer] 2020

## 2020-12-12 NOTE — ED BEHAVIORAL HEALTH ASSESSMENT NOTE - HPI (INCLUDE ILLNESS QUALITY, SEVERITY, DURATION, TIMING, CONTEXT, MODIFYING FACTORS, ASSOCIATED SIGNS AND SYMPTOMS)
Patient is a 36 yo man, single, no children, undomiciled, PPH of multiple substance use d/o's (EtOH, opiates, ccn, K2, MJ, BZD), h/o EtOH w/d seizures, chart dx of BAD (vs SIMD) nonadherent w/ outpt mental health care, innumerable ED visits, documented hx of malingering, high utilizer, many past rehabs/detoxes, multiple past psych hospitalizations (most recently at [facility] 5/2019), multiple past reported SAs, no significant PMH, no known violence hx, BIBEMS after found sleeping on the street.  Pt reported AH/SI to medical ED.    Of note patient presented to the [facility] ED yesterday, intoxicated, and was d/c'd.  He notes that after d/c he drank "3 L" of liquor and slept.  BAL on arrival was 57.  He denies any psychiatric sx to me including changes in sleep/appetite/energy level/mood.  Denies SI/HI.  Denies AVH/PI.  Denies manic sx.  Notes that only substances he has been using recently are EtOH, MJ/K2 (last used yesterday) and cigarettes.  He is interested in outpatient substance use referral.    Denies COVID-19 testing in past 21 days, denies out of state travel/known COVID-19 contacts in past 14 days.

## 2020-12-12 NOTE — ED BEHAVIORAL HEALTH ASSESSMENT NOTE - SUMMARY
Patient is a 34 yo man, single, no children, undomiciled, PPH of multiple substance use d/o's (EtOH, opiates, ccn, K2, MJ, BZD), h/o EtOH w/d seizures, chart dx of BAD (vs SIMD) nonadherent w/ outpt mental health care, innumerable ED visits, documented hx of malingering, high utilizer, many past rehabs/detoxes, multiple past psych hospitalizations (most recently at [facility] 5/2019), multiple past reported SAs, no significant PMH, no known violence hx, BIBEMS after found sleeping on the street.  Pt reported AH/SI to medical ED.  On exam patient is calm, euthymic, in behavioral control, and denies any psychiatric complaints.  He is future-oriented, able to engage in safety planning, and requesting discharge w/ outpt substance use referrals for which he is appropriate.

## 2020-12-21 ENCOUNTER — INPATIENT (INPATIENT)
Facility: HOSPITAL | Age: 35
LOS: 0 days | Discharge: LEFT BEFORE DISCHARGE | DRG: 894 | End: 2020-12-22
Attending: INTERNAL MEDICINE | Admitting: INTERNAL MEDICINE
Payer: MEDICAID

## 2020-12-21 VITALS — HEIGHT: 63 IN

## 2020-12-21 LAB
ALBUMIN SERPL ELPH-MCNC: 3.6 G/DL — SIGNIFICANT CHANGE UP (ref 3.3–5)
ALP SERPL-CCNC: 44 U/L — SIGNIFICANT CHANGE UP (ref 40–120)
ALT FLD-CCNC: 14 U/L — SIGNIFICANT CHANGE UP (ref 10–45)
AMPHET UR-MCNC: NEGATIVE — SIGNIFICANT CHANGE UP
ANION GAP SERPL CALC-SCNC: 13 MMOL/L — SIGNIFICANT CHANGE UP (ref 5–17)
APAP SERPL-MCNC: <5 UG/ML — LOW (ref 10–30)
APPEARANCE UR: CLEAR — SIGNIFICANT CHANGE UP
APTT BLD: 25.6 SEC — LOW (ref 27.5–35.5)
AST SERPL-CCNC: 16 U/L — SIGNIFICANT CHANGE UP (ref 10–40)
BACTERIA # UR AUTO: PRESENT /HPF
BARBITURATES UR SCN-MCNC: NEGATIVE — SIGNIFICANT CHANGE UP
BASE EXCESS BLDV CALC-SCNC: -0.6 MMOL/L — SIGNIFICANT CHANGE UP
BASE EXCESS BLDV CALC-SCNC: -4.5 MMOL/L — SIGNIFICANT CHANGE UP
BASOPHILS # BLD AUTO: 0.04 K/UL — SIGNIFICANT CHANGE UP (ref 0–0.2)
BASOPHILS NFR BLD AUTO: 0.4 % — SIGNIFICANT CHANGE UP (ref 0–2)
BENZODIAZ UR-MCNC: POSITIVE
BILIRUB SERPL-MCNC: 0.3 MG/DL — SIGNIFICANT CHANGE UP (ref 0.2–1.2)
BILIRUB UR-MCNC: NEGATIVE — SIGNIFICANT CHANGE UP
BUN SERPL-MCNC: 6 MG/DL — LOW (ref 7–23)
CA-I SERPL-SCNC: 1.02 MMOL/L — LOW (ref 1.12–1.3)
CA-I SERPL-SCNC: 1.03 MMOL/L — LOW (ref 1.12–1.3)
CALCIUM SERPL-MCNC: 8.2 MG/DL — LOW (ref 8.4–10.5)
CHLORIDE SERPL-SCNC: 101 MMOL/L — SIGNIFICANT CHANGE UP (ref 96–108)
CO2 SERPL-SCNC: 24 MMOL/L — SIGNIFICANT CHANGE UP (ref 22–31)
COCAINE METAB.OTHER UR-MCNC: NEGATIVE — SIGNIFICANT CHANGE UP
COLOR SPEC: YELLOW — SIGNIFICANT CHANGE UP
CREAT SERPL-MCNC: 0.56 MG/DL — SIGNIFICANT CHANGE UP (ref 0.5–1.3)
DIFF PNL FLD: NEGATIVE — SIGNIFICANT CHANGE UP
EOSINOPHIL # BLD AUTO: 0.08 K/UL — SIGNIFICANT CHANGE UP (ref 0–0.5)
EOSINOPHIL NFR BLD AUTO: 0.9 % — SIGNIFICANT CHANGE UP (ref 0–6)
EPI CELLS # UR: SIGNIFICANT CHANGE UP /HPF (ref 0–5)
ETHANOL SERPL-MCNC: 316 MG/DL — HIGH (ref 0–10)
GAS PNL BLDV: 135 MMOL/L — LOW (ref 138–146)
GAS PNL BLDV: 136 MMOL/L — LOW (ref 138–146)
GAS PNL BLDV: SIGNIFICANT CHANGE UP
GLUCOSE SERPL-MCNC: 148 MG/DL — HIGH (ref 70–99)
GLUCOSE UR QL: NEGATIVE — SIGNIFICANT CHANGE UP
HCO3 BLDV-SCNC: 22 MMOL/L — SIGNIFICANT CHANGE UP (ref 20–27)
HCO3 BLDV-SCNC: 25 MMOL/L — SIGNIFICANT CHANGE UP (ref 20–27)
HCT VFR BLD CALC: 38.5 % — LOW (ref 39–50)
HGB BLD-MCNC: 12.5 G/DL — LOW (ref 13–17)
IMM GRANULOCYTES NFR BLD AUTO: 0.6 % — SIGNIFICANT CHANGE UP (ref 0–1.5)
INR BLD: 1.01 — SIGNIFICANT CHANGE UP (ref 0.88–1.16)
KETONES UR-MCNC: NEGATIVE — SIGNIFICANT CHANGE UP
LACTATE SERPL-SCNC: 2.8 MMOL/L — HIGH (ref 0.5–2)
LACTATE SERPL-SCNC: 3.6 MMOL/L — HIGH (ref 0.5–2)
LEUKOCYTE ESTERASE UR-ACNC: NEGATIVE — SIGNIFICANT CHANGE UP
LYMPHOCYTES # BLD AUTO: 4.01 K/UL — HIGH (ref 1–3.3)
LYMPHOCYTES # BLD AUTO: 44.2 % — HIGH (ref 13–44)
MCHC RBC-ENTMCNC: 30.6 PG — SIGNIFICANT CHANGE UP (ref 27–34)
MCHC RBC-ENTMCNC: 32.5 GM/DL — SIGNIFICANT CHANGE UP (ref 32–36)
MCV RBC AUTO: 94.4 FL — SIGNIFICANT CHANGE UP (ref 80–100)
METHADONE UR-MCNC: NEGATIVE — SIGNIFICANT CHANGE UP
MONOCYTES # BLD AUTO: 0.54 K/UL — SIGNIFICANT CHANGE UP (ref 0–0.9)
MONOCYTES NFR BLD AUTO: 6 % — SIGNIFICANT CHANGE UP (ref 2–14)
NEUTROPHILS # BLD AUTO: 4.35 K/UL — SIGNIFICANT CHANGE UP (ref 1.8–7.4)
NEUTROPHILS NFR BLD AUTO: 47.9 % — SIGNIFICANT CHANGE UP (ref 43–77)
NITRITE UR-MCNC: NEGATIVE — SIGNIFICANT CHANGE UP
NRBC # BLD: 0 /100 WBCS — SIGNIFICANT CHANGE UP (ref 0–0)
OPIATES UR-MCNC: NEGATIVE — SIGNIFICANT CHANGE UP
PCO2 BLDV: 44 MMHG — SIGNIFICANT CHANGE UP (ref 41–51)
PCO2 BLDV: 45 MMHG — SIGNIFICANT CHANGE UP (ref 41–51)
PCP SPEC-MCNC: SIGNIFICANT CHANGE UP
PCP UR-MCNC: NEGATIVE — SIGNIFICANT CHANGE UP
PH BLDV: 7.31 — LOW (ref 7.32–7.43)
PH BLDV: 7.36 — SIGNIFICANT CHANGE UP (ref 7.32–7.43)
PH UR: 7 — SIGNIFICANT CHANGE UP (ref 5–8)
PLATELET # BLD AUTO: 269 K/UL — SIGNIFICANT CHANGE UP (ref 150–400)
PO2 BLDV: 160 MMHG — SIGNIFICANT CHANGE UP
PO2 BLDV: 52 MMHG — SIGNIFICANT CHANGE UP
POTASSIUM BLDV-SCNC: 3.6 MMOL/L — SIGNIFICANT CHANGE UP (ref 3.5–4.9)
POTASSIUM BLDV-SCNC: 3.8 MMOL/L — SIGNIFICANT CHANGE UP (ref 3.5–4.9)
POTASSIUM SERPL-MCNC: 3 MMOL/L — LOW (ref 3.5–5.3)
POTASSIUM SERPL-SCNC: 3 MMOL/L — LOW (ref 3.5–5.3)
PROT SERPL-MCNC: 5.9 G/DL — LOW (ref 6–8.3)
PROT UR-MCNC: ABNORMAL MG/DL
PROTHROM AB SERPL-ACNC: 12.1 SEC — SIGNIFICANT CHANGE UP (ref 10.6–13.6)
RBC # BLD: 4.08 M/UL — LOW (ref 4.2–5.8)
RBC # FLD: 13.6 % — SIGNIFICANT CHANGE UP (ref 10.3–14.5)
RBC CASTS # UR COMP ASSIST: < 5 /HPF — SIGNIFICANT CHANGE UP
SALICYLATES SERPL-MCNC: <0.3 MG/DL — LOW (ref 2.8–20)
SAO2 % BLDV: 82 % — SIGNIFICANT CHANGE UP
SAO2 % BLDV: 99 % — SIGNIFICANT CHANGE UP
SARS-COV-2 RNA SPEC QL NAA+PROBE: SIGNIFICANT CHANGE UP
SODIUM SERPL-SCNC: 138 MMOL/L — SIGNIFICANT CHANGE UP (ref 135–145)
SP GR SPEC: 1.02 — SIGNIFICANT CHANGE UP (ref 1–1.03)
THC UR QL: NEGATIVE — SIGNIFICANT CHANGE UP
UROBILINOGEN FLD QL: 0.2 E.U./DL — SIGNIFICANT CHANGE UP
WBC # BLD: 9.07 K/UL — SIGNIFICANT CHANGE UP (ref 3.8–10.5)
WBC # FLD AUTO: 9.07 K/UL — SIGNIFICANT CHANGE UP (ref 3.8–10.5)
WBC UR QL: < 5 /HPF — SIGNIFICANT CHANGE UP

## 2020-12-21 PROCEDURE — 93010 ELECTROCARDIOGRAM REPORT: CPT

## 2020-12-21 PROCEDURE — 99291 CRITICAL CARE FIRST HOUR: CPT

## 2020-12-21 PROCEDURE — 72125 CT NECK SPINE W/O DYE: CPT | Mod: 26

## 2020-12-21 PROCEDURE — 71045 X-RAY EXAM CHEST 1 VIEW: CPT | Mod: 26

## 2020-12-21 PROCEDURE — 70450 CT HEAD/BRAIN W/O DYE: CPT | Mod: 26

## 2020-12-21 PROCEDURE — 99291 CRITICAL CARE FIRST HOUR: CPT | Mod: 25

## 2020-12-21 RX ORDER — NALOXONE HYDROCHLORIDE 4 MG/.1ML
8 SPRAY NASAL ONCE
Refills: 0 | Status: COMPLETED | OUTPATIENT
Start: 2020-12-21 | End: 2020-12-21

## 2020-12-21 RX ORDER — POTASSIUM CHLORIDE 20 MEQ
10 PACKET (EA) ORAL
Refills: 0 | Status: COMPLETED | OUTPATIENT
Start: 2020-12-21 | End: 2020-12-22

## 2020-12-21 RX ORDER — SODIUM CHLORIDE 9 MG/ML
1000 INJECTION INTRAMUSCULAR; INTRAVENOUS; SUBCUTANEOUS ONCE
Refills: 0 | Status: COMPLETED | OUTPATIENT
Start: 2020-12-21 | End: 2020-12-21

## 2020-12-21 RX ORDER — SODIUM CHLORIDE 9 MG/ML
1 INJECTION INTRAMUSCULAR; INTRAVENOUS; SUBCUTANEOUS ONCE
Refills: 0 | Status: COMPLETED | OUTPATIENT
Start: 2020-12-21 | End: 2020-12-21

## 2020-12-21 RX ORDER — FOLIC ACID 0.8 MG
1 TABLET ORAL EVERY 24 HOURS
Refills: 0 | Status: DISCONTINUED | OUTPATIENT
Start: 2020-12-22 | End: 2020-12-22

## 2020-12-21 RX ORDER — THIAMINE MONONITRATE (VIT B1) 100 MG
100 TABLET ORAL EVERY 24 HOURS
Refills: 0 | Status: DISCONTINUED | OUTPATIENT
Start: 2020-12-22 | End: 2020-12-22

## 2020-12-21 RX ADMIN — SODIUM CHLORIDE 1000 MILLILITER(S): 9 INJECTION INTRAMUSCULAR; INTRAVENOUS; SUBCUTANEOUS at 17:56

## 2020-12-21 RX ADMIN — SODIUM CHLORIDE 1000 MILLILITER(S): 9 INJECTION INTRAMUSCULAR; INTRAVENOUS; SUBCUTANEOUS at 19:30

## 2020-12-21 RX ADMIN — SODIUM CHLORIDE 1000 MILLILITER(S): 9 INJECTION INTRAMUSCULAR; INTRAVENOUS; SUBCUTANEOUS at 20:36

## 2020-12-21 RX ADMIN — SODIUM CHLORIDE 1000 MILLILITER(S): 9 INJECTION INTRAMUSCULAR; INTRAVENOUS; SUBCUTANEOUS at 22:13

## 2020-12-21 RX ADMIN — NALOXONE HYDROCHLORIDE 8 MILLIGRAM(S): 4 SPRAY NASAL at 17:51

## 2020-12-21 NOTE — CONSULT NOTE ADULT - ASSESSMENT
******************* INCOMPLETE NOTE ***************************  35M with known drug/EtOH abuse/psych hx (including SI and attempt via overdose) presented with encephalopathy, responsive only to noxious stimuli    #Encephalopathy  Pt with known drug/EtOH abuse/psych hx presented with encephalopathy, responsive only to noxious stimuli. Utox positive for benzos, EtOH.         Dispo: pending discussion with MICU attending.  ******************* INCOMPLETE NOTE ***************************  35M with known drug/EtOH abuse/psych hx (including SI and attempt via overdose) presented with encephalopathy, responsive only to noxious stimuli    #Encephalopathy  Pt with known drug/EtOH abuse/psych hx presented with encephalopathy, responsive only to noxious stimuli. Utox positive for benzos, EtOH.  . -s/p 2L NS, 8mg Narcan in [facility] ED, 4mg Narcan via EMS         Dispo: pending discussion with MICU attending.  ******************* INCOMPLETE NOTE ***************************  35M with known drug/EtOH abuse/psych hx (including SI and attempt via overdose) presented with encephalopathy, responsive only to noxious stimuli    #Encephalopathy  Pt with known drug/EtOH abuse/psych hx presented with encephalopathy, responsive only to noxious stimuli. Utox positive for benzos, EtOH.  . -s/p 2L NS, 8mg Narcan in [facility] ED, 4mg Narcan via EMS. Encephalopathy likely 2/2 active intoxication.         Dispo: pending discussion with MICU attending.  ******************* INCOMPLETE NOTE ***************************  35M with known drug/EtOH abuse/psych hx (including SI and attempt via overdose) presented with encephalopathy, responsive only to noxious stimuli    #Encephalopathy  Pt with known drug/EtOH abuse/psych hx presented with encephalopathy, responsive only to noxious stimuli. Utox positive for benzos, EtOH.  . -s/p 2L NS, 8mg Narcan in [facility] ED, 4mg Narcan via EMS. Encephalopathy likely 2/2 active intoxication.   - 2L NS  - K IV x3  - CIWA protocol   - Ativan PRN for CIWA > 7, Librium if pt exhibits signs of withdrawal  - close monitoring for signs of withdrawal/respiratory distress       Dispo: Admit to [facility]. Plan discussed with MICU attending, ED attending [facility] team.   ******************* INCOMPLETE NOTE ***************************  35M with known drug/EtOH abuse/psych hx (including SI and attempt via overdose) presented with encephalopathy, responsive only to noxious stimuli    #Encephalopathy  Pt with known drug/EtOH abuse/psych hx presented with encephalopathy, responsive only to noxious stimuli. Utox positive for benzos, EtOH.  .  -s/p 2L NS, 8mg Narcan in [facility] ED, 4mg Narcan via EMS. Encephalopathy likely 2/2 active intoxication.   - 2L NS  - K IV x3  - CIWA protocol   - give thiamine, folate as pt with known EtOH abuse hx  - Ativan PRN for CIWA > 7, Librium if pt exhibits signs of withdrawal  - close monitoring for signs of withdrawal/respiratory distress       Dispo: Admit to [facility]. Plan discussed with MICU attending, ED attending [facility] team.   35M with known drug/EtOH abuse/psych hx (including SI and attempt via overdose) presented with encephalopathy, responsive only to noxious stimuli. MICU consulted for AMS.     #Encephalopathy  Pt with known drug/EtOH abuse/psych hx presented with encephalopathy, responsive only to noxious stimuli. Utox positive for benzos, EtOH.  .  -s/p 2L NS, 8mg Narcan in [facility] ED, 4mg Narcan via EMS. Encephalopathy likely 2/2 active intoxication.   - 2L NS  - K IV x3  - CIWA protocol   - give thiamine, folate as pt with known EtOH abuse hx  - Ativan PRN for CIWA > 7, Librium if pt exhibits signs of withdrawal  - close monitoring for signs of withdrawal/respiratory distress       Dispo: Admit to [facility]. Plan discussed with MICU attending, ED attending [facility] team.

## 2020-12-21 NOTE — ED PROVIDER NOTE - PHYSICAL EXAMINATION
VITAL SIGNS: I have reviewed nursing notes and confirm.  CONSTITUTIONAL: Well-developed; well-nourished; in no acute distress.   SKIN:  warm and dry, no acute rash.   HEAD:  normocephalic, atraumatic.  EYES: PERRL.  EOM intact; conjunctiva and sclera clear.  ENT: No nasal discharge; airway clear.   NECK: Supple; non tender.  CARD: S1, S2 normal; no murmurs, gallops, or rubs. Regular rate and rhythm.   RESP:  NO increased work of breathing.  Clear to auscultation b/l, no wheezes, rales or rhonchi.  ABD: Normal bowel sounds; soft; non-distended; non-tender; no guarding/rebound.  EXT: Normal ROM. No clubbing, cyanosis or edema. 2+ pulses to b/l ue/le.  NEURO: Responsive to painful stimulus, non verbal, unable to follow commands.  No facial asymmetry.

## 2020-12-21 NOTE — ED ADULT NURSE NOTE - PRO INTERPRETER NEED 2
English Mastoid Interpolation Flap Text: A decision was made to reconstruct the defect utilizing an interpolation axial flap and a staged reconstruction.  A telfa template was made of the defect.  This telfa template was then used to outline the mastoid interpolation flap.  The donor area for the pedicle flap was then injected with anesthesia.  The flap was excised through the skin and subcutaneous tissue down to the layer of the underlying musculature.  The pedicle flap was carefully excised within this deep plane to maintain its blood supply.  The edges of the donor site were undermined.   The donor site was closed in a primary fashion.  The pedicle was then rotated into position and sutured.  Once the tube was sutured into place, adequate blood supply was confirmed with blanching and refill.  The pedicle was then wrapped with xeroform gauze and dressed appropriately with a telfa and gauze bandage to ensure continued blood supply and protect the attached pedicle.

## 2020-12-21 NOTE — ED ADULT NURSE NOTE - OBJECTIVE STATEMENT
Patient is a 35y male [place]. Vital signs stable upon arrival. Respirations even and unlabored. Pt presents with pinpoint pupils. As per EMS, "Pt was given 4mg of Narcan IN and then 4mg Narcan IV. Pt responded to both doses and started to breath better afterwords." Pt unresponsive to painful stimulus. Pt rectal temp 94.3F.  Provider made aware. Pt placed on bare hugger. Pt appears to be snoring. Capillary refill less than 3 seconds. No head trauma noted upon physical examination.

## 2020-12-21 NOTE — CONSULT NOTE ADULT - ATTENDING COMMENTS
etoh and benzo intoxication  lactic acidosis likely dehydration  pt somnolent, focal to pain with all 4 extr, seems able to protect airway  no evidence sepsis  no evidence serotonin syndrome   hypokalemia    admit medical stepdown  treat with further ivf  supplement kcl  monitor for withdrawal from etoh, treat as per symptoms

## 2020-12-21 NOTE — ED PROVIDER NOTE - CARE PLAN
Principal Discharge DX:	Altered mental status, unspecified altered mental status type   Principal Discharge DX:	Altered mental status, unspecified altered mental status type  Secondary Diagnosis:	Alcohol intoxication  Secondary Diagnosis:	Polysubstance abuse

## 2020-12-21 NOTE — CONSULT NOTE ADULT - SUBJECTIVE AND OBJECTIVE BOX
[facility] SERVICE CONSULTATION NOTE    CC: AMS    HPI: 35M with ADD (attention deficit disorder), anxiety , bipolar disorder, depression, HTN, seizures, chronic drug/EtOH abuse/psych hx (including SI and attempt via overdose),malingering, past inpatient psych admissions [place]  Per ED provider EMS gave 4 mg narcan with some response while in the field.  On arrival to [facility] ED patient pt found to be maintaining airway with O2 saturation at 98% on RA but somnolent and responsive only to noxious stimuli.  Of note, pt recently in [facility] ED () for SI without plan discharged with safety plan for f/u at NA meetings. MICU consulted for AMS.     ED vitals: T: 94.3 --> 96.8 HR: 80 BP:114/72 RR:13 SpO2: 100% 2L NC  ED labs: WBC   ED imaging:  ED meds:    ROS:  Otherwise negative, except as specified in HPI.    PMH:    PSH:    FH:    SH:    ALLERGIES:    MEDICATIONS:    VITAL SIGNS:  ICU Vital Signs Last 24 Hrs  T(C): 37.4 (21 Dec 2020 22:12), Max: 37.4 (21 Dec 2020 22:12)  T(F): 99.3 (21 Dec 2020 22:12), Max: 99.3 (21 Dec 2020 22:12)  HR: 97 (21 Dec 2020 21:49) (78 - 97)  BP: 101/56 (21 Dec 2020 21:49) (98/57 - 114/72)  BP(mean): --  ABP: --  ABP(mean): --  RR: 20 (21 Dec 2020 21:49) (13 - 20)  SpO2: 100% (21 Dec 2020 21:49) (99% - 100%)    CAPILLARY BLOOD GLUCOSE      POCT Blood Glucose.: 128 mg/dL (21 Dec 2020 18:02)      PHYSICAL EXAM:  Constitutional: resting comfortably in bed, NAD  HEENT: NC/AT; PERRL, anicteric sclera; no oropharyngeal erythema or exudates; MMM  Neck: supple, no appreciable JVD  Respiratory: CTA B/L, no W/R/R; respirations appear non-labored, conversive in full sentences  Cardiovascular: +S1/S2, RRR  Gastrointestinal: abdomen soft, NT/ND  Extremities: WWP; no clubbing, cyanosis or edema  Vascular: 2+ radial, femoral, and DP/PT pulses B/L  Dermatologic: skin normal color and turgor; no visible rashes  Neurological:     LABS:                        12.5   9.07  )-----------( 269      ( 21 Dec 2020 18:17 )             38.5         138  |  101  |  6<L>  ----------------------------<  148<H>  3.0<L>   |  24  |  0.56    Ca    8.2<L>      21 Dec 2020 18:17    TPro  5.9<L>  /  Alb  3.6  /  TBili  0.3  /  DBili  x   /  AST  16  /  ALT  14  /  AlkPhos  44      PT/INR - ( 21 Dec 2020 18:17 )   PT: 12.1 sec;   INR: 1.01          PTT - ( 21 Dec 2020 18:17 )  PTT:25.6 sec  Lactate, Blood: 2.8 mmol/L (20 @ 21:12)  Lactate, Blood: 3.6 mmol/L (20 @ 18:17)        Urinalysis Basic - ( 21 Dec 2020 18:32 )    Color: Yellow / Appearance: Clear / S.020 / pH: x  Gluc: x / Ketone: NEGATIVE  / Bili: Negative / Urobili: 0.2 E.U./dL   Blood: x / Protein: Trace mg/dL / Nitrite: NEGATIVE   Leuk Esterase: NEGATIVE / RBC: < 5 /HPF / WBC < 5 /HPF   Sq Epi: x / Non Sq Epi: 0-5 /HPF / Bacteria: Present /HPF      Blood Gas Profile w/Lytes - Venous: Performed in Lab (20 @ 18:21)      EKG: Reviewed.    RADIOLOGY & ADDITIONAL TESTS: Reviewed. [facility] SERVICE CONSULTATION NOTE    CC: AMS    HPI: 35M with ADD (attention deficit disorder), anxiety , bipolar disorder, depression, HTN, seizures, chronic drug/EtOH abuse/psych hx (including SI and attempt via overdose),malingering, past inpatient psych admissions, undomiciled  [place]  Per ED provider EMS gave 4 mg narcan with some response while in the field.  On arrival to [facility] ED patient pt found to be maintaining airway with O2 saturation at 98% on RA but somnolent and responsive only to noxious stimuli.  Of note, pt recently in [facility] ED () for SI without plan discharged with safety plan for f/u at NA meetings. MICU consulted for AMS.     ED vitals: T: 94.3 --> 96.8 HR: 80 BP:114/72 RR:13 SpO2: 100% 2L NC  ED labs: WBC 9, Hgb 12.5, platelets 269, K 3, lactate 3.6, Utox + benzos,    ED imaging: CTH neg for acute infarct, chronic cerebellar infarct (new from 2017), CT cervical spine neg acute cervical spine fracture, CXR with bilateral hazy infiltrate  ED meds: 8mg Narcan, 2L NS    ROS:  Otherwise negative, except as specified in HPI.    PMH: ADD (attention deficit disorder), anxiety , bipolar disorder, depression, HTN, seizures, chronic drug/EtOH abuse/psych hx (including SI and attempt via overdose),malingering, past inpatient psych admissions      SH: undomiciled     ALLERGIES:    MEDICATIONS:    VITAL SIGNS:  ICU Vital Signs Last 24 Hrs  T(C): 37.4 (21 Dec 2020 22:12), Max: 37.4 (21 Dec 2020 22:12)  T(F): 99.3 (21 Dec 2020 22:12), Max: 99.3 (21 Dec 2020 22:12)  HR: 97 (21 Dec 2020 21:49) (78 - 97)  BP: 101/56 (21 Dec 2020 21:49) (98/57 - 114/72)  BP(mean): --  ABP: --  ABP(mean): --  RR: 20 (21 Dec 2020 21:49) (13 - 20)  SpO2: 100% (21 Dec 2020 21:49) (99% - 100%)    CAPILLARY BLOOD GLUCOSE      POCT Blood Glucose.: 128 mg/dL (21 Dec 2020 18:02)      PHYSICAL EXAM:  Constitutional: resting comfortably in bed, NAD  HEENT: NC/AT; PERRL, anicteric sclera; no oropharyngeal erythema or exudates; MMM  Neck: supple, no appreciable JVD  Respiratory: CTA B/L, no W/R/R; respirations appear non-labored, conversive in full sentences  Cardiovascular: +S1/S2, RRR  Gastrointestinal: abdomen soft, NT/ND  Extremities: WWP; no clubbing, cyanosis or edema  Vascular: 2+ radial, femoral, and DP/PT pulses B/L  Dermatologic: skin normal color and turgor; no visible rashes  Neurological:     LABS:                        12.5   9.07  )-----------( 269      ( 21 Dec 2020 18:17 )             38.5         138  |  101  |  6<L>  ----------------------------<  148<H>  3.0<L>   |  24  |  0.56    Ca    8.2<L>      21 Dec 2020 18:17    TPro  5.9<L>  /  Alb  3.6  /  TBili  0.3  /  DBili  x   /  AST  16  /  ALT  14  /  AlkPhos  44      PT/INR - ( 21 Dec 2020 18:17 )   PT: 12.1 sec;   INR: 1.01          PTT - ( 21 Dec 2020 18:17 )  PTT:25.6 sec  Lactate, Blood: 2.8 mmol/L (20 @ 21:12)  Lactate, Blood: 3.6 mmol/L (20 @ 18:17)        Urinalysis Basic - ( 21 Dec 2020 18:32 )    Color: Yellow / Appearance: Clear / S.020 / pH: x  Gluc: x / Ketone: NEGATIVE  / Bili: Negative / Urobili: 0.2 E.U./dL   Blood: x / Protein: Trace mg/dL / Nitrite: NEGATIVE   Leuk Esterase: NEGATIVE / RBC: < 5 /HPF / WBC < 5 /HPF   Sq Epi: x / Non Sq Epi: 0-5 /HPF / Bacteria: Present /HPF      Blood Gas Profile w/Lytes - Venous: Performed in Lab (20 @ 18:21)      EKG: Reviewed.    RADIOLOGY & ADDITIONAL TESTS: Reviewed.  < from: CT Head No Cont (12..20 @ 18:53) >  FINDINGS:    HEAD CT:    The ventricles, sulci and cisterns are normal in size and configuration. No hydrocephalus.    There is no acute intracranial hemorrhage, mass effect, midline shift or extra-axial fluid collection.    There is encephalomalacia and gliosis in the left posterior medial cerebellar hemisphere and vermis which is new compared to 2017 but present in retrospect on more recent prior imaging from 2020. Otherwise, gray-white matter differentiation appears preserved without evidence of recent transcortical infarct.    On bony window inspection, there is no calvarial fracture. The tympanomastoid cavities are well-aerated.      CERVICAL SPINE CT:    There is mild motion degradation of the study limiting fine detail at the C5 and C6 levels.    Alignment of the cervical spine shows a preserved lordosis. No spondylolisthesis, facet subluxation or malalignment at the craniovertebral or atlantoaxial articulations. On coronal imaging, there is a mild levocurvature centered at C4-5 which may be positional.    There is no prevertebral swelling or other acute intraspinal soft tissue abnormality.    The vertebral bodies are preserved in height and density. No acute or displacedfracture is identified given motion limited view at C5/6.    The intervertebral discs are preserved in height without significant bony spinal canal or neural foraminal narrowing. No large disc herniation visualized given soft tissue contrast resolution limitations inherent to CT and motion artifact.      IMPRESSION:    HEAD CT: No intracranial hemorrhage or calvarial fracture. No mass effect or CT evidence of recent transcortical infarct. Chronic left cerebellar infarct, stable in retrospect but new from 2017.    CERVICAL SPINE CT: No acute cervical spine fracture or malalignment, given mildly motion degraded appearance of the C5 and C6 vertebrae.          Thank you for the opportunity to participate in the care of this patient.        < end of copied text >   [facility] SERVICE CONSULTATION NOTE    CC: AMS    HPI: 35M with ADD (attention deficit disorder), anxiety , bipolar disorder, depression, HTN, seizures, chronic drug/EtOH abuse/psych hx (including SI and attempt via overdose),malingering, past inpatient psych admissions, undomiciled  [place]  Per ED provider EMS gave 4 mg narcan with some response while in the field.  On arrival to [facility] ED patient pt found to be maintaining airway with O2 saturation at 98% on RA but somnolent and responsive only to noxious stimuli.  Of note, pt recently in [facility] ED () for SI without plan discharged with safety plan for f/u at NA meetings. MICU consulted for AMS.     ED vitals: T: 94.3 --> 96.8 HR: 80 BP:114/72 RR:13 SpO2: 100% 2L NC  ED labs: WBC 9, Hgb 12.5, platelets 269, K 3, lactate 3.6, Utox + benzos,    ED imaging: CTH neg for acute infarct, chronic cerebellar infarct (new from 2017), CT cervical spine neg acute cervical spine fracture, CXR with bilateral hazy infiltrate  ED meds: 8mg Narcan, 2L NS    ROS:  Otherwise negative, except as specified in HPI.    PMH: ADD (attention deficit disorder), anxiety , bipolar disorder, depression, HTN, seizures, chronic drug/EtOH abuse/psych hx (including SI and attempt via overdose),malingering, past inpatient psych admissions      SH: undomiciled     ALLERGIES:    MEDICATIONS:    VITAL SIGNS:  ICU Vital Signs Last 24 Hrs  T(C): 37.4 (21 Dec 2020 22:12), Max: 37.4 (21 Dec 2020 22:12)  T(F): 99.3 (21 Dec 2020 22:12), Max: 99.3 (21 Dec 2020 22:12)  HR: 97 (21 Dec 2020 21:49) (78 - 97)  BP: 101/56 (21 Dec 2020 21:49) (98/57 - 114/72)  BP(mean): --  ABP: --  ABP(mean): --  RR: 20 (21 Dec 2020 21:49) (13 - 20)  SpO2: 100% (21 Dec 2020 21:49) (99% - 100%)    CAPILLARY BLOOD GLUCOSE      POCT Blood Glucose.: 128 mg/dL (21 Dec 2020 18:02)      PHYSICAL EXAM:  Constitutional: somnolent, responsive only to noxious stimuli, sleeping in bed, NAD  HEENT: NC/AT; pinpoint, sluggish pupils,, anicteric sclera; no oropharyngeal erythema or exudates; MMM  Neck: supple, no appreciable JVD  Respiratory: bronchial breath sounds respirations appear non-labored,  Cardiovascular: +S1/S2, RRR  Gastrointestinal: abdomen soft, NT/ND  Extremities: WWP; no clubbing, cyanosis or edema  Vascular: 2+ radial, femoral, and DP/PT pulses B/L  Dermatologic: skin normal color and turgor; no visible rashes  Neurological: somnolent, responsive only to noxious stimuli    LABS:                        12.5   9.07  )-----------( 269      ( 21 Dec 2020 18:17 )             38.5         138  |  101  |  6<L>  ----------------------------<  148<H>  3.0<L>   |  24  |  0.56    Ca    8.2<L>      21 Dec 2020 18:17    TPro  5.9<L>  /  Alb  3.6  /  TBili  0.3  /  DBili  x   /  AST  16  /  ALT  14  /  AlkPhos  44      PT/INR - ( 21 Dec 2020 18:17 )   PT: 12.1 sec;   INR: 1.01          PTT - ( 21 Dec 2020 18:17 )  PTT:25.6 sec  Lactate, Blood: 2.8 mmol/L (20 @ 21:12)  Lactate, Blood: 3.6 mmol/L (20 @ 18:17)        Urinalysis Basic - ( 21 Dec 2020 18:32 )    Color: Yellow / Appearance: Clear / S.020 / pH: x  Gluc: x / Ketone: NEGATIVE  / Bili: Negative / Urobili: 0.2 E.U./dL   Blood: x / Protein: Trace mg/dL / Nitrite: NEGATIVE   Leuk Esterase: NEGATIVE / RBC: < 5 /HPF / WBC < 5 /HPF   Sq Epi: x / Non Sq Epi: 0-5 /HPF / Bacteria: Present /HPF      Blood Gas Profile w/Lytes - Venous: Performed in Lab (20 @ 18:21)      EKG: Reviewed.    RADIOLOGY & ADDITIONAL TESTS: Reviewed.  < from: CT Head No Cont (20 @ 18:53) >  FINDINGS:    HEAD CT:    The ventricles, sulci and cisterns are normal in size and configuration. No hydrocephalus.    There is no acute intracranial hemorrhage, mass effect, midline shift or extra-axial fluid collection.    There is encephalomalacia and gliosis in the left posterior medial cerebellar hemisphere and vermis which is new compared to 2017 but present in retrospect on more recent prior imaging from 2020. Otherwise, gray-white matter differentiation appears preserved without evidence of recent transcortical infarct.    On bony window inspection, there is no calvarial fracture. The tympanomastoid cavities are well-aerated.      CERVICAL SPINE CT:    There is mild motion degradation of the study limiting fine detail at the C5 and C6 levels.    Alignment of the cervical spine shows a preserved lordosis. No spondylolisthesis, facet subluxation or malalignment at the craniovertebral or atlantoaxial articulations. On coronal imaging, there is a mild levocurvature centered at C4-5 which may be positional.    There is no prevertebral swelling or other acute intraspinal soft tissue abnormality.    The vertebral bodies are preserved in height and density. No acute or displacedfracture is identified given motion limited view at C5/6.    The intervertebral discs are preserved in height without significant bony spinal canal or neural foraminal narrowing. No large disc herniation visualized given soft tissue contrast resolution limitations inherent to CT and motion artifact.      IMPRESSION:    HEAD CT: No intracranial hemorrhage or calvarial fracture. No mass effect or CT evidence of recent transcortical infarct. Chronic left cerebellar infarct, stable in retrospect but new from 2017.    CERVICAL SPINE CT: No acute cervical spine fracture or malalignment, given mildly motion degraded appearance of the C5 and C6 vertebrae.          Thank you for the opportunity to participate in the care of this patient.        < end of copied text >

## 2020-12-21 NOTE — ED PROVIDER NOTE - CLINICAL SUMMARY MEDICAL DECISION MAKING FREE TEXT BOX
[place].  Patient given narcan with minimal effect.  Lavs reviewed.  ETOH elevated, UDS positive for benzos.  Patient protecting his airway, stable vitals.  Hypothermic per rectal temp and le hugger placed with subsequent normalization of temperature.  Given several liters IVF and monitored in ED.  CT head and cervical spine negative for acute process.  Following completion of my shift, patient's care is handed over to oncoming night team for disposition following re evaluation/clinical sobriety.  Patient is stable at time of transfer of care. Patient presents [place] ED [place]  Patient given narcan with minimal effect.  Lavs reviewed.  ETOH elevated, UDS positive for benzos.  Patient protecting his airway, stable vitals.  Hypothermic per rectal temp and le hugger placed with subsequent normalization of temperature.  Given several liters IVF and monitored in ED.  CT head and cervical spine negative for acute process.  MICU team consulted for admission and close monitoring given patient remains only minimally responsive following close monitoring for several hours in ED.  Following completion of my shift, patient's care is handed over to oncoming night team for disposition pending MICU recommendations.  Patient stable at time of transfer of care. Patient presents [place] ED [place]  Patient given narcan with minimal effect.  Lavs reviewed.  ETOH elevated, UDS positive for benzos.  Patient protecting his airway, stable vitals.  Hypothermic per rectal temp and le hugger placed with subsequent normalization of temperature.  Given several liters IVF and monitored in ED.  CT head and cervical spine negative for acute process.  MICU team consulted for admission and close monitoring given patient remains only minimally responsive following close monitoring for several hours in ED.  MICU team in ED to evaluate and agreeable to step down admission.  Will admit at this time.

## 2020-12-22 VITALS — HEART RATE: 86 BPM

## 2020-12-22 DIAGNOSIS — E87.6 HYPOKALEMIA: ICD-10-CM

## 2020-12-22 DIAGNOSIS — R79.89 OTHER SPECIFIED ABNORMAL FINDINGS OF BLOOD CHEMISTRY: ICD-10-CM

## 2020-12-22 DIAGNOSIS — F19.10 OTHER PSYCHOACTIVE SUBSTANCE ABUSE, UNCOMPLICATED: ICD-10-CM

## 2020-12-22 LAB
ANION GAP SERPL CALC-SCNC: 11 MMOL/L — SIGNIFICANT CHANGE UP (ref 5–17)
BUN SERPL-MCNC: 6 MG/DL — LOW (ref 7–23)
CALCIUM SERPL-MCNC: 7.5 MG/DL — LOW (ref 8.4–10.5)
CHLORIDE SERPL-SCNC: 111 MMOL/L — HIGH (ref 96–108)
CO2 SERPL-SCNC: 19 MMOL/L — LOW (ref 22–31)
CREAT SERPL-MCNC: 0.52 MG/DL — SIGNIFICANT CHANGE UP (ref 0.5–1.3)
GLUCOSE SERPL-MCNC: 107 MG/DL — HIGH (ref 70–99)
LACTATE SERPL-SCNC: 2.3 MMOL/L — HIGH (ref 0.5–2)
POTASSIUM SERPL-MCNC: 4.1 MMOL/L — SIGNIFICANT CHANGE UP (ref 3.5–5.3)
POTASSIUM SERPL-SCNC: 4.1 MMOL/L — SIGNIFICANT CHANGE UP (ref 3.5–5.3)
SODIUM SERPL-SCNC: 141 MMOL/L — SIGNIFICANT CHANGE UP (ref 135–145)

## 2020-12-22 PROCEDURE — 85730 THROMBOPLASTIN TIME PARTIAL: CPT

## 2020-12-22 PROCEDURE — 85025 COMPLETE CBC W/AUTO DIFF WBC: CPT

## 2020-12-22 PROCEDURE — 96374 THER/PROPH/DIAG INJ IV PUSH: CPT

## 2020-12-22 PROCEDURE — 93005 ELECTROCARDIOGRAM TRACING: CPT | Mod: 76

## 2020-12-22 PROCEDURE — 84484 ASSAY OF TROPONIN QUANT: CPT

## 2020-12-22 PROCEDURE — 84295 ASSAY OF SERUM SODIUM: CPT

## 2020-12-22 PROCEDURE — 82803 BLOOD GASES ANY COMBINATION: CPT

## 2020-12-22 PROCEDURE — 82550 ASSAY OF CK (CPK): CPT

## 2020-12-22 PROCEDURE — 80053 COMPREHEN METABOLIC PANEL: CPT

## 2020-12-22 PROCEDURE — 85610 PROTHROMBIN TIME: CPT

## 2020-12-22 PROCEDURE — 99285 EMERGENCY DEPT VISIT HI MDM: CPT | Mod: 25

## 2020-12-22 PROCEDURE — 36415 COLL VENOUS BLD VENIPUNCTURE: CPT

## 2020-12-22 PROCEDURE — 71045 X-RAY EXAM CHEST 1 VIEW: CPT

## 2020-12-22 PROCEDURE — 80048 BASIC METABOLIC PNL TOTAL CA: CPT

## 2020-12-22 PROCEDURE — 99231 SBSQ HOSP IP/OBS SF/LOW 25: CPT | Mod: GC

## 2020-12-22 PROCEDURE — U0003: CPT

## 2020-12-22 PROCEDURE — 82330 ASSAY OF CALCIUM: CPT

## 2020-12-22 PROCEDURE — 80307 DRUG TEST PRSMV CHEM ANLYZR: CPT

## 2020-12-22 PROCEDURE — 96361 HYDRATE IV INFUSION ADD-ON: CPT

## 2020-12-22 PROCEDURE — 81001 URINALYSIS AUTO W/SCOPE: CPT

## 2020-12-22 PROCEDURE — 70450 CT HEAD/BRAIN W/O DYE: CPT

## 2020-12-22 PROCEDURE — 87040 BLOOD CULTURE FOR BACTERIA: CPT

## 2020-12-22 PROCEDURE — 83735 ASSAY OF MAGNESIUM: CPT

## 2020-12-22 PROCEDURE — 82962 GLUCOSE BLOOD TEST: CPT

## 2020-12-22 PROCEDURE — 84132 ASSAY OF SERUM POTASSIUM: CPT

## 2020-12-22 PROCEDURE — 72125 CT NECK SPINE W/O DYE: CPT

## 2020-12-22 PROCEDURE — 83605 ASSAY OF LACTIC ACID: CPT

## 2020-12-22 PROCEDURE — 87086 URINE CULTURE/COLONY COUNT: CPT

## 2020-12-22 RX ADMIN — Medication 100 MILLIEQUIVALENT(S): at 04:57

## 2020-12-22 RX ADMIN — Medication 100 MILLIEQUIVALENT(S): at 00:16

## 2020-12-22 RX ADMIN — Medication 100 MILLIEQUIVALENT(S): at 02:44

## 2020-12-22 RX ADMIN — SODIUM CHLORIDE 2 MILLILITER(S): 9 INJECTION INTRAMUSCULAR; INTRAVENOUS; SUBCUTANEOUS at 00:16

## 2020-12-22 NOTE — H&P ADULT - NSICDXPASTMEDICALHX_GEN_ALL_CORE_FT
PAST MEDICAL HISTORY:  ADD (attention deficit disorder)     Anxiety     Bipolar disorder     Chronic drug abuse     Depression     HTN (hypertension)     Polysubstance abuse     Seizures

## 2020-12-22 NOTE — H&P ADULT - PROBLEM SELECTOR PLAN 4
Patient presented with lactate of 3.6, repeat lactate downtrended to 2.8. No anion gap.   - will continue to trend lacate  - f/u AM labs

## 2020-12-22 NOTE — H&P ADULT - PROBLEM SELECTOR PLAN 2
Patient has history of polysubstance use. Has had multiple admissions for alcohol intoxication. Previous admission, patient was admitted for SI and discharged to follow up with NA. Unlikely patient went but will obtain collateral.   - Started on folate, thiamine, and multivitamin  - Patient on CIWA protocol as stated above  - Consider starting librium if patient shows signs of withdrawal

## 2020-12-22 NOTE — H&P ADULT - NSHPPHYSICALEXAM_GEN_ALL_CORE
.  VITAL SIGNS:  T(C): 37.2 (12-21-20 @ 23:00), Max: 37.4 (12-21-20 @ 22:12)  T(F): 99 (12-21-20 @ 23:00), Max: 99.3 (12-21-20 @ 22:12)  HR: 100 (12-22-20 @ 00:54) (78 - 102)  BP: 121/64 (12-22-20 @ 00:54) (98/57 - 121/64)  BP(mean): --  RR: 20 (12-22-20 @ 00:54) (13 - 20)  SpO2: 100% (12-22-20 @ 00:54) (92% - 100%)  Wt(kg): --    PHYSICAL EXAM:    Constitutional: somnolent, responsive only to noxious stimuli, sleeping in bed, NAD  HEENT: NC/AT; pinpoint, sluggish pupils,, anicteric sclera; no oropharyngeal erythema or exudates; MMM  Neck: supple, no appreciable JVD  Respiratory: bronchial breath sounds respirations appear non-labored,  Cardiovascular: +S1/S2, RRR  Gastrointestinal: abdomen soft, NT/ND  Extremities: WWP; no clubbing, cyanosis or edema  Vascular: 2+ radial, femoral, and DP/PT pulses B/L  Dermatologic: skin normal color and turgor; no visible rashes  Neurological: somnolent, responsive only to noxious stimuli

## 2020-12-22 NOTE — H&P ADULT - PROBLEM SELECTOR PLAN 1
Patient presents with AMS 2/2 alcohol intoxication. Pt with known drug/EtOH abuse/psych hx presented with encephalopathy, responsive only to noxious stimuli. Utox positive for benzos, EtOH.  .  -s/p 2L NS, 8mg Narcan in [facility] ED, 4mg Narcan via EMS. Encephalopathy likely 2/2 active intoxication.   - 2L NS  - K IV x3  - CIWA protocol   - give thiamine, folate as pt with known EtOH abuse hx  - Ativan PRN for CIWA > 7, Librium if pt exhibits signs of withdrawal  - close monitoring for signs of withdrawal/respiratory distress

## 2020-12-22 NOTE — H&P ADULT - NSHPLABSRESULTS_GEN_ALL_CORE
.  LABS:                         12.5   9.07  )-----------( 269      ( 21 Dec 2020 18:17 )             38.5         138  |  101  |  6<L>  ----------------------------<  148<H>  3.0<L>   |  24  |  0.56    Ca    8.2<L>      21 Dec 2020 18:17  Mg     2.2         TPro  5.9<L>  /  Alb  3.6  /  TBili  0.3  /  DBili  x   /  AST  16  /  ALT  14  /  AlkPhos  44      PT/INR - ( 21 Dec 2020 18:17 )   PT: 12.1 sec;   INR: 1.01          PTT - ( 21 Dec 2020 18:17 )  PTT:25.6 sec  Urinalysis Basic - ( 21 Dec 2020 18:32 )    Color: Yellow / Appearance: Clear / S.020 / pH: x  Gluc: x / Ketone: NEGATIVE  / Bili: Negative / Urobili: 0.2 E.U./dL   Blood: x / Protein: Trace mg/dL / Nitrite: NEGATIVE   Leuk Esterase: NEGATIVE / RBC: < 5 /HPF / WBC < 5 /HPF   Sq Epi: x / Non Sq Epi: 0-5 /HPF / Bacteria: Present /HPF      CARDIAC MARKERS ( 21 Dec 2020 18:17 )  x     / <0.01 ng/mL / 61 U/L / x     / x            Lactate, Blood: 2.8 mmol/L ( @ 21:12)  Lactate, Blood: 3.6 mmol/L ( @ 18:17)      RADIOLOGY, EKG & ADDITIONAL TESTS: Reviewed.

## 2020-12-22 NOTE — CHART NOTE - NSCHARTNOTEFT_GEN_A_CORE
The patient wishes to leave against medical advice.  I have discussed the risks, benefits and alternatives (including the possibility of worsening of disease, pain, permanent disability, and/or death) with the patient. He was told that his withdrawal can worsen if he leaves. The patient voices understanding of these risks, benefits, and alternatives and still wishes to sign out against medical advice. He said that he wants to go volunteer in the [facility] and wants to leave. He asked for librium but was told that he did not yet qualify for librium (CIWA 2). He expressed his understanding and states that he just wants to leave; stated that he has a psychiatrist appointment today in [place].  The patient is awake, alert, oriented  x 3 and has demonstrated capacity to refuse/direct care.  I have advised the patient that they can and should return immediately should they develop any worse/different/additional symptoms, or if they change their mind and want to continue their care.

## 2020-12-22 NOTE — H&P ADULT - HISTORY OF PRESENT ILLNESS
35M with ADD (attention deficit disorder), anxiety , bipolar disorder, depression, HTN, seizures, chronic drug/EtOH abuse/psych hx (including SI and attempt via overdose),malingering, past inpatient psych admissions, [place].  Per ED provider EMS gave 4 mg Narcan with some response while in the field.  On arrival to [facility] ED, patient pt found to be maintaining airway with O2 saturation at 98% on RA but somnolent and responsive only to noxious stimuli.  Of note, pt recently in [facility] ED (12/12) for SI without plan discharged with safety plan for f/u at NA meetings.     ED vitals: T: 94.3 --> 96.8 HR: 80 BP:114/72 RR:13 SpO2: 100% 2L NC  ED labs: WBC 9, Hgb 12.5, platelets 269, K 3, lactate 3.6, Utox + benzos,    ED imaging: CTH neg for acute infarct, chronic cerebellar infarct (new from Jan 2017), CT cervical spine neg acute cervical spine fracture, CXR with bilateral hazy infiltrate  ED meds: 8mg Narcan, 2L NS

## 2020-12-22 NOTE — H&P ADULT - PROBLEM SELECTOR PLAN 3
Patient presented with K of 3.0. No EKG changes.   - Repleted K  - Trend BMP and f/u repeat K and Mg  - Repeat EKG in AM

## 2020-12-22 NOTE — H&P ADULT - ASSESSMENT
35M with known drug/EtOH abuse/psych hx (including SI and attempt via overdose) presented with encephalopathy, responsive only to noxious stimuli

## 2020-12-22 NOTE — H&P ADULT - NSHPSOCIALHISTORY_GEN_ALL_CORE
Undomiciled   Alcohol use and drug use disorder  Psychiatric hx  Left home 4 years ago, mother has not heard from or seen him in 1 year per previous note in chart

## 2020-12-23 LAB
CULTURE RESULTS: NO GROWTH — SIGNIFICANT CHANGE UP
SPECIMEN SOURCE: SIGNIFICANT CHANGE UP

## 2020-12-24 ENCOUNTER — EMERGENCY (EMERGENCY)
Facility: HOSPITAL | Age: 35
LOS: 1 days | Discharge: HOME | End: 2020-12-24
Admitting: EMERGENCY MEDICINE
Payer: MEDICAID

## 2020-12-24 VITALS
RESPIRATION RATE: 18 BRPM | TEMPERATURE: 98 F | WEIGHT: 149.91 LBS | HEART RATE: 102 BPM | HEIGHT: 63 IN | SYSTOLIC BLOOD PRESSURE: 145 MMHG | OXYGEN SATURATION: 97 % | DIASTOLIC BLOOD PRESSURE: 76 MMHG

## 2020-12-24 DIAGNOSIS — Z03.89 ENCOUNTER FOR OBSERVATION FOR OTHER SUSPECTED DISEASES AND CONDITIONS RULED OUT: ICD-10-CM

## 2020-12-24 PROCEDURE — 99283 EMERGENCY DEPT VISIT LOW MDM: CPT

## 2020-12-24 NOTE — ED PROVIDER NOTE - OBJECTIVE STATEMENT
34 y/o male here with no medical complaints asking for food. Patient poor historian and poor hygiene. Denies fever, chills, abdominal pain, change in bowel function, flank pain, rash, HA, dizziness, SOB, CP, palpitations, diaphoresis, cough, and malaise.

## 2020-12-24 NOTE — ED ADULT TRIAGE NOTE - CHIEF COMPLAINT QUOTE
BIBA ambulatory complaining of left foot pain. States he stepped on a broken glass. No obvious laceration on arrival, states he's wearing shoes when it happened.

## 2020-12-24 NOTE — ED PROVIDER NOTE - PATIENT PORTAL LINK FT
You can access the FollowMyHealth Patient Portal offered by [facility] by registering at the following website: [email or web address]. By joining [facility]s FollowMyHealth portal, you will also be able to view your health information using other applications (apps) compatible with our system.

## 2020-12-24 NOTE — ED ADULT NURSE NOTE - OBJECTIVE STATEMENT
Patient reported that he stepped on glass with shoes on. Patient not bleeding upon arrival to ED. Patient states that he "needs new shoes." Patient undomeciled and unkempt. Patient alert and oriented x3, ambulatory with steady gait, no acute distress.
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
## 2020-12-28 DIAGNOSIS — F10.120 ALCOHOL ABUSE WITH INTOXICATION, UNCOMPLICATED: ICD-10-CM

## 2020-12-28 DIAGNOSIS — E86.0 DEHYDRATION: ICD-10-CM

## 2020-12-28 DIAGNOSIS — R56.9 UNSPECIFIED CONVULSIONS: ICD-10-CM

## 2020-12-28 DIAGNOSIS — E87.2 ACIDOSIS: ICD-10-CM

## 2020-12-28 DIAGNOSIS — F90.9 ATTENTION-DEFICIT HYPERACTIVITY DISORDER, UNSPECIFIED TYPE: ICD-10-CM

## 2020-12-28 DIAGNOSIS — E87.6 HYPOKALEMIA: ICD-10-CM

## 2020-12-28 DIAGNOSIS — F13.120 SEDATIVE, HYPNOTIC OR ANXIOLYTIC ABUSE WITH INTOXICATION, UNCOMPLICATED: ICD-10-CM

## 2020-12-28 DIAGNOSIS — F31.9 BIPOLAR DISORDER, UNSPECIFIED: ICD-10-CM

## 2020-12-28 DIAGNOSIS — Z91.5 PERSONAL HISTORY OF SELF-HARM: ICD-10-CM

## 2020-12-28 DIAGNOSIS — I10 ESSENTIAL (PRIMARY) HYPERTENSION: ICD-10-CM

## 2020-12-28 DIAGNOSIS — Z76.5 MALINGERER [CONSCIOUS SIMULATION]: ICD-10-CM

## 2020-12-28 DIAGNOSIS — G93.40 ENCEPHALOPATHY, UNSPECIFIED: ICD-10-CM

## 2020-12-28 DIAGNOSIS — R41.82 ALTERED MENTAL STATUS, UNSPECIFIED: ICD-10-CM

## 2021-01-05 ENCOUNTER — EMERGENCY (EMERGENCY)
Facility: HOSPITAL | Age: 36
LOS: 1 days | Discharge: HOME | End: 2021-01-05
Attending: EMERGENCY MEDICINE | Admitting: EMERGENCY MEDICINE
Payer: COMMERCIAL

## 2021-01-05 VITALS
TEMPERATURE: 97 F | SYSTOLIC BLOOD PRESSURE: 125 MMHG | RESPIRATION RATE: 18 BRPM | HEIGHT: 63 IN | DIASTOLIC BLOOD PRESSURE: 83 MMHG | HEART RATE: 88 BPM | WEIGHT: 134.92 LBS | OXYGEN SATURATION: 97 %

## 2021-01-05 DIAGNOSIS — R41.82 ALTERED MENTAL STATUS, UNSPECIFIED: ICD-10-CM

## 2021-01-05 DIAGNOSIS — I10 ESSENTIAL (PRIMARY) HYPERTENSION: ICD-10-CM

## 2021-01-05 DIAGNOSIS — F10.129 ALCOHOL ABUSE WITH INTOXICATION, UNSPECIFIED: ICD-10-CM

## 2021-01-05 DIAGNOSIS — Z79.2 LONG TERM (CURRENT) USE OF ANTIBIOTICS: ICD-10-CM

## 2021-01-05 DIAGNOSIS — Z79.899 OTHER LONG TERM (CURRENT) DRUG THERAPY: ICD-10-CM

## 2021-01-05 PROCEDURE — 99284 EMERGENCY DEPT VISIT MOD MDM: CPT

## 2021-01-05 PROCEDURE — 82962 GLUCOSE BLOOD TEST: CPT

## 2021-01-05 PROCEDURE — 99285 EMERGENCY DEPT VISIT HI MDM: CPT

## 2021-01-05 NOTE — ED ADULT TRIAGE NOTE - CHIEF COMPLAINT QUOTE
pt BIBEMS for intoxication, admits to drinking 1 liter of vodka and smoking marijuana. pt states he is here for help. denies any si/hi. fs 90 in the field, repeat in progress.

## 2021-01-05 NOTE — ED PROVIDER NOTE - NSFOLLOWUPINSTRUCTIONS_ED_ALL_ED_FT
Please see your primary care provider in 2-3 days.  Call for appointment.  If you have any problems with followup, please call the ED Referral Coordinator at [phone].  Return to the ER if symptoms worsen or other concerns.    Alcohol Abuse    Alcohol intoxication occurs when the amount of alcohol that a person has consumed impairs his or her ability to mentally and physically function. Chronic alcohol consumption can also lead to a variety of health issues including neurological disease, stomach disease, heart disease, liver disease, etc. Do not drive after drinking alcohol. Drinking enough alcohol to end up in an Emergency Room suggests you may have an alcohol abuse problem. Seek help at a drug addiction center.    SEEK IMMEDIATE MEDICAL CARE IF YOU HAVE ANY OF THE FOLLOWING SYMPTOMS: seizures, vomiting blood, blood in your stool, lightheadedness/dizziness, or becoming shaky to tremulous when you stop drinking.

## 2021-01-05 NOTE — ED ADULT NURSE NOTE - OBJECTIVE STATEMENT
Patient was BIBEMS for intoxication, patient admitted in triage to drinking 1 liter of vodka and smoking marijuana. Endorsed that he is here for help. Safety precautions initiated.

## 2021-01-05 NOTE — ED PROVIDER NOTE - PHYSICAL EXAMINATION
· CONSTITUTIONAL: smells of alcohol, well nourished, awake, alert, oriented to person,  and in no apparent distress.  · ENMT: Airway patent, Nasal mucosa clear. Mouth with normal mucosa.  · HEAD: Head is atraumatic. Head shape is symmetrical.  · EYES: Clear bilaterally, pupils equal, round and reactive to light.  · CARDIAC: Normal rate, regular rhythm.  Heart sounds S1, S2.  · RESPIRATORY: Breath sounds clear and equal bilaterally.  · GASTROINTESTINAL: Abdomen soft, no guarding.  · MUSCULOSKELETAL: Spine appears normal, range of motion is not limited  · NEUROLOGICAL: Alert and oriented to self, speech slurred, not following formal neuro exam  · SKIN: Skin normal color for race, warm, dry and intact. No evidence of rash.  · PSYCHIATRIC: Alert and oriented to person. intoxicated affect. no apparent risk to self or others.  · HEME LYMPH: No adenopathy · CONSTITUTIONAL: unkempt, smells of alcohol, well nourished, awake, alert, oriented to person,  and in no apparent distress.  · ENMT: Airway patent, Nasal mucosa clear. Mouth with normal mucosa.  · HEAD: Head is atraumatic. Head shape is symmetrical.  · EYES: Clear bilaterally, pupils equal, round and reactive to light.  · CARDIAC: Normal rate, regular rhythm.  Heart sounds S1, S2.  · RESPIRATORY: Breath sounds clear and equal bilaterally.  · GASTROINTESTINAL: Abdomen soft, no guarding.  · MUSCULOSKELETAL: Spine appears normal, range of motion is not limited  · NEUROLOGICAL: Alert and oriented to self, speech slurred, not following formal neuro exam  · SKIN: Skin normal color for race, warm, dry and intact. No evidence of rash.  · PSYCHIATRIC: Alert and oriented to person. intoxicated affect. no apparent risk to self or others.  · HEME LYMPH: No adenopathy

## 2021-01-05 NOTE — ED PROVIDER NOTE - OBJECTIVE STATEMENT
brought in by ems with altered mental status.  Admits to drinking.  Denies trauma.  Unable to provide further coherent history due to ams

## 2021-01-05 NOTE — ED PROVIDER NOTE - PATIENT PORTAL LINK FT
You can access the FollowMyHealth Patient Portal offered by [facility] by registering at the following website: [email or web address]. By joining [employer]’s FollowMyHealth portal, you will also be able to view your health information using other applications (apps) compatible with our system.

## 2021-01-14 ENCOUNTER — EMERGENCY (EMERGENCY)
Facility: HOSPITAL | Age: 36
LOS: 1 days | Discharge: HOME | End: 2021-01-14
Admitting: EMERGENCY MEDICINE
Payer: MEDICAID

## 2021-01-14 VITALS
RESPIRATION RATE: 16 BRPM | DIASTOLIC BLOOD PRESSURE: 73 MMHG | TEMPERATURE: 98 F | HEART RATE: 93 BPM | SYSTOLIC BLOOD PRESSURE: 118 MMHG | OXYGEN SATURATION: 98 % | HEIGHT: 63 IN

## 2021-01-14 DIAGNOSIS — Z20.822 CONTACT WITH AND (SUSPECTED) EXPOSURE TO COVID-19: ICD-10-CM

## 2021-01-14 LAB — SARS-COV-2 RNA SPEC QL NAA+PROBE: SIGNIFICANT CHANGE UP

## 2021-01-14 PROCEDURE — U0003: CPT

## 2021-01-14 PROCEDURE — 99283 EMERGENCY DEPT VISIT LOW MDM: CPT

## 2021-01-14 PROCEDURE — U0005: CPT

## 2021-01-14 NOTE — ED PROVIDER NOTE - PATIENT PORTAL LINK FT
You can access the FollowMyHealth Patient Portal offered by [facility] by registering at the following website: [email or web address]. By joining [employer]’s FollowMyHealth portal, you will also be able to view your health information using other applications (apps) compatible with our system.

## 2021-01-16 ENCOUNTER — EMERGENCY (EMERGENCY)
Facility: HOSPITAL | Age: 36
LOS: 1 days | Discharge: HOME | End: 2021-01-16
Attending: EMERGENCY MEDICINE | Admitting: EMERGENCY MEDICINE
Payer: MEDICAID

## 2021-01-16 VITALS
WEIGHT: 130.07 LBS | OXYGEN SATURATION: 96 % | DIASTOLIC BLOOD PRESSURE: 97 MMHG | RESPIRATION RATE: 20 BRPM | SYSTOLIC BLOOD PRESSURE: 144 MMHG | HEART RATE: 99 BPM | HEIGHT: 63 IN | TEMPERATURE: 98 F

## 2021-01-16 VITALS
TEMPERATURE: 98 F | OXYGEN SATURATION: 95 % | DIASTOLIC BLOOD PRESSURE: 80 MMHG | HEART RATE: 98 BPM | SYSTOLIC BLOOD PRESSURE: 150 MMHG | RESPIRATION RATE: 20 BRPM

## 2021-01-16 DIAGNOSIS — Z79.2 LONG TERM (CURRENT) USE OF ANTIBIOTICS: ICD-10-CM

## 2021-01-16 DIAGNOSIS — I10 ESSENTIAL (PRIMARY) HYPERTENSION: ICD-10-CM

## 2021-01-16 DIAGNOSIS — F10.120 ALCOHOL ABUSE WITH INTOXICATION, UNCOMPLICATED: ICD-10-CM

## 2021-01-16 DIAGNOSIS — R41.82 ALTERED MENTAL STATUS, UNSPECIFIED: ICD-10-CM

## 2021-01-16 DIAGNOSIS — Z79.899 OTHER LONG TERM (CURRENT) DRUG THERAPY: ICD-10-CM

## 2021-01-16 PROCEDURE — 82962 GLUCOSE BLOOD TEST: CPT

## 2021-01-16 PROCEDURE — 99284 EMERGENCY DEPT VISIT MOD MDM: CPT

## 2021-01-16 PROCEDURE — 99285 EMERGENCY DEPT VISIT HI MDM: CPT

## 2021-01-16 NOTE — ED PROVIDER NOTE - CLINICAL SUMMARY MEDICAL DECISION MAKING FREE TEXT BOX
no need for acute medical care today. resources given, new clothes give, pt fed, and metrocard given. DC to street in NAD with strict return precautions given.

## 2021-01-16 NOTE — ED ADULT NURSE NOTE - NSIMPLEMENTINTERV_GEN_ALL_ED
Implemented All Fall Risk Interventions:  [place] to call system. Call bell, personal items and telephone within reach. Instruct patient to call for assistance. Room bathroom lighting operational. Non-slip footwear when patient is off stretcher. Physically safe environment: no spills, clutter or unnecessary equipment. Stretcher in lowest position, wheels locked, appropriate side rails in place. Provide visual cue, wrist band, yellow gown, etc. Monitor gait and stability. Monitor for mental status changes and reorient to person, place, and time. Review medications for side effects contributing to fall risk. Reinforce activity limits and safety measures with patient and family.

## 2021-01-16 NOTE — ED ADULT NURSE NOTE - OBJECTIVE STATEMENT
pt states he came to ER because he wants to stop drinking alcohol.  pt reports binge drinking the last 3 days.  pt states he drank a liter of vodka today and used k2 and marijuana.  pt reports hx of alcohol withdrawal (reports hallucinations with past withdrawal).   pt denies SI and HI.  pt states he is homeless.  pt appears unkempt and malodorous.  pt cooperative.  pt excessively talkative.   pt's belongings secured.   bed low position.   side rails up x 2.  fall risk precautions in place.

## 2021-01-16 NOTE — ED PROVIDER NOTE - PATIENT PORTAL LINK FT
You can access the FollowMyHealth Patient Portal offered by [facility] by registering at the following website: [email or web address]. By joining [employer]’s FollowMyHealth portal, you will also be able to view your health information using other applications (apps) compatible with our system.

## 2021-01-16 NOTE — ED ADULT NURSE REASSESSMENT NOTE - NS ED NURSE REASSESS COMMENT FT1
pt cleared for discharge by Dr. Lee. .  pt given sandwich, yogurt, and ginger ale.  pt eating/drinking without difficulty.  pt given written resources for shelters, psychiatry clinics, and a metrocard.

## 2021-01-16 NOTE — ED PROVIDER NOTE - NSFOLLOWUPINSTRUCTIONS_ED_ALL_ED_FT
We gave you a list of shelters, and mental health clinics. Please follow up to establish and continue care.       Alcohol Abuse    Alcohol intoxication occurs when the amount of alcohol that a person has consumed impairs his or her ability to mentally and physically function. Chronic alcohol consumption can also lead to a variety of health issues including neurological disease, stomach disease, heart disease, liver disease, etc. Do not drive after drinking alcohol. Drinking enough alcohol to end up in an Emergency Room suggests you may have an alcohol abuse problem. Seek help at a drug addiction center.    SEEK IMMEDIATE MEDICAL CARE IF YOU HAVE ANY OF THE FOLLOWING SYMPTOMS: seizures, vomiting blood, blood in your stool, lightheadedness/dizziness, or becoming shaky to tremulous when you stop drinking.

## 2021-01-16 NOTE — ED PROVIDER NOTE - OBJECTIVE STATEMENT
34yo M here req resources as he wants to stop drinking alcohol.  pt reports binge drinking the last 3 days.  pt states he drank a liter of vodka today and used k2 and marijuana.  pt reports hx of alcohol withdrawal (reports hallucinations with past withdrawal).   pt denies SI and HI.  pt states he is homeless but on a waiting list for shelter. States off bipolar meds for a long time, not seeing any psychaitrist. denies any acute events today but is motivated to make changes in his life and this is why he is here today. also states clothes are wet, asking for new socks, and pants if available.

## 2021-01-16 NOTE — ED PROVIDER NOTE - PHYSICAL EXAMINATION
CONSTITUTIONAL: young male, disheveled  HEAD: Normocephalic; atraumatic.   EYES:  conjunctiva and sclera clear  ENT: normal nose; no rhinorrhea; normal pharynx with no erythema or lesions.   NECK: Supple; non-tender;   CARDIOVASCULAR: Normal S1, S2; no murmurs, rubs, or gallops. Regular rate and rhythm.   RESPIRATORY: Breathing easily; breath sounds clear and equal bilaterally; no wheezes, rhonchi, or rales.  GI: Soft; non-distended; non-tender; no palpable organomegaly.   EXT: No cyanosis or edema; N/V intact  SKIN: Normal for age and race; warm; dry; good turgor; no apparent lesions or rash.   NEURO: A & O x 3; face symmetric; grossly unremarkable.   PSYCHOLOGICAL: The patient’s mood and manner are appropriate.

## 2021-01-25 ENCOUNTER — EMERGENCY (EMERGENCY)
Facility: HOSPITAL | Age: 36
LOS: 1 days | Discharge: HOME | End: 2021-01-25
Admitting: EMERGENCY MEDICINE
Payer: MEDICAID

## 2021-01-25 VITALS
HEIGHT: 63 IN | TEMPERATURE: 98 F | OXYGEN SATURATION: 97 % | WEIGHT: 139.99 LBS | SYSTOLIC BLOOD PRESSURE: 134 MMHG | HEART RATE: 71 BPM | RESPIRATION RATE: 18 BRPM | DIASTOLIC BLOOD PRESSURE: 90 MMHG

## 2021-01-25 DIAGNOSIS — I10 ESSENTIAL (PRIMARY) HYPERTENSION: ICD-10-CM

## 2021-01-25 DIAGNOSIS — F10.10 ALCOHOL ABUSE, UNCOMPLICATED: ICD-10-CM

## 2021-01-25 DIAGNOSIS — Z79.899 OTHER LONG TERM (CURRENT) DRUG THERAPY: ICD-10-CM

## 2021-01-25 DIAGNOSIS — R41.82 ALTERED MENTAL STATUS, UNSPECIFIED: ICD-10-CM

## 2021-01-25 LAB — GLUCOSE BLDC GLUCOMTR-MCNC: 135 MG/DL — HIGH (ref 70–99)

## 2021-01-25 PROCEDURE — 99284 EMERGENCY DEPT VISIT MOD MDM: CPT

## 2021-01-25 NOTE — ED PROVIDER NOTE - OBJECTIVE STATEMENT
36 y/o male BIB EMS from [facility] for EtOH intoxication/AMS. [place] and bystander called EMS. Admits to heavy EtOH use today.  No acute medical complaints or apparent trauma noted. Unable to cooperate with remainder of history due to clinical condition/AMS.

## 2021-01-25 NOTE — ED PROVIDER NOTE - CLINICAL SUMMARY MEDICAL DECISION MAKING FREE TEXT BOX
Patient presenting with acute ETOH intoxication/AMS. No trauma or injuries noted. History and ROS limited due to altered state.  No evidence of head or extremity trauma. Will observe for clinical sobriety.

## 2021-01-25 NOTE — ED ADULT NURSE NOTE - NSIMPLEMENTINTERV_GEN_ALL_ED
Implemented All Fall with Harm Risk Interventions:  [place] to call system. Call bell, personal items and telephone within reach. Instruct patient to call for assistance. Room bathroom lighting operational. Non-slip footwear when patient is off stretcher. Physically safe environment: no spills, clutter or unnecessary equipment. Stretcher in lowest position, wheels locked, appropriate side rails in place. Provide visual cue, wrist band, yellow gown, etc. Monitor gait and stability. Monitor for mental status changes and reorient to person, place, and time. Review medications for side effects contributing to fall risk. Reinforce activity limits and safety measures with patient and family. Provide visual clues: red socks.

## 2021-01-25 NOTE — ED PROVIDER NOTE - PATIENT PORTAL LINK FT
You can access the FollowMyHealth Patient Portal offered by [facility] by registering at the following website: [email or web address]. By joining [employer]’s FollowMyHealth portal, you will also be able to view your health information using other applications (apps) compatible with our system.

## 2021-01-25 NOTE — ED ADULT TRIAGE NOTE - CHIEF COMPLAINT QUOTE
[place]. [place] and bystanders called. Pt admits to alcohol use. Pt has steady gait.

## 2021-01-27 ENCOUNTER — EMERGENCY (EMERGENCY)
Facility: HOSPITAL | Age: 36
LOS: 1 days | Discharge: HOME | End: 2021-01-27
Attending: EMERGENCY MEDICINE | Admitting: EMERGENCY MEDICINE
Payer: MEDICAID

## 2021-01-27 VITALS
HEIGHT: 63 IN | HEART RATE: 92 BPM | DIASTOLIC BLOOD PRESSURE: 99 MMHG | OXYGEN SATURATION: 97 % | TEMPERATURE: 98 F | SYSTOLIC BLOOD PRESSURE: 137 MMHG | RESPIRATION RATE: 18 BRPM | WEIGHT: 139.99 LBS

## 2021-01-27 DIAGNOSIS — F12.10 CANNABIS ABUSE, UNCOMPLICATED: ICD-10-CM

## 2021-01-27 DIAGNOSIS — I10 ESSENTIAL (PRIMARY) HYPERTENSION: ICD-10-CM

## 2021-01-27 DIAGNOSIS — F10.10 ALCOHOL ABUSE, UNCOMPLICATED: ICD-10-CM

## 2021-01-27 DIAGNOSIS — Z79.899 OTHER LONG TERM (CURRENT) DRUG THERAPY: ICD-10-CM

## 2021-01-27 DIAGNOSIS — R41.82 ALTERED MENTAL STATUS, UNSPECIFIED: ICD-10-CM

## 2021-01-27 LAB — GLUCOSE BLDC GLUCOMTR-MCNC: 97 MG/DL — SIGNIFICANT CHANGE UP (ref 70–99)

## 2021-01-27 PROCEDURE — 99284 EMERGENCY DEPT VISIT MOD MDM: CPT

## 2021-01-27 NOTE — ED ADULT NURSE NOTE - CAS DISCH TRANSFER METHOD
Walking Dapsone Counseling: I discussed with the patient the risks of dapsone including but not limited to hemolytic anemia, agranulocytosis, rashes, methemoglobinemia, kidney failure, peripheral neuropathy, headaches, GI upset, and liver toxicity.  Patients who start dapsone require monitoring including baseline LFTs and weekly CBCs for the first month, then every month thereafter.  The patient verbalized understanding of the proper use and possible adverse effects of dapsone.  All of the patient's questions and concerns were addressed.

## 2021-01-27 NOTE — ED ADULT TRIAGE NOTE - CHIEF COMPLAINT QUOTE
Pt walked in c/o alcohol intoxication. Admits to ETOH use and K2 use, last drink 1 hour ago. Pt is ambulatory on arrival with steady gait, no obvious signs of trauma. AOx4, conversive in triage.

## 2021-01-27 NOTE — ED PROVIDER NOTE - OBJECTIVE STATEMENT
34 yo male ambulated to ED c/o being tired after drinking etoh " all day", smoking K2. Pt requesting a place to rest and food. No other complaints.

## 2021-01-27 NOTE — ED ADULT NURSE NOTE - PAIN RATING/NUMBER SCALE (0-10): REST
You were seen in the emergency department for abdominal pain, nausea, vomiting and diarrhea likely caused by gastritis.     Please follow-up with your primary care doctor in the next 24-48 hours.     Please continue to drink plenty of fluids.     If you have any worsening symptoms, severe abdominal pain, nausea, vomiting, or you are unable to tolerate food or fluids, please return ot the emergency department.
0

## 2021-01-27 NOTE — ED PROVIDER NOTE - PATIENT PORTAL LINK FT
You can access the FollowMyHealth Patient Portal offered by [facility] by registering at the following website: [email or web address]. By joining [employer]’s FollowMyHealth portal, you will also be able to view your health information using other applications (apps) compatible with our system.
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
## 2021-03-01 ENCOUNTER — EMERGENCY (EMERGENCY)
Facility: HOSPITAL | Age: 36
LOS: 1 days | Discharge: HOME | End: 2021-03-01
Attending: EMERGENCY MEDICINE | Admitting: EMERGENCY MEDICINE
Payer: MEDICAID

## 2021-03-01 VITALS
SYSTOLIC BLOOD PRESSURE: 153 MMHG | TEMPERATURE: 97 F | WEIGHT: 139.99 LBS | RESPIRATION RATE: 16 BRPM | OXYGEN SATURATION: 95 % | HEART RATE: 100 BPM | HEIGHT: 63 IN | DIASTOLIC BLOOD PRESSURE: 100 MMHG

## 2021-03-01 DIAGNOSIS — F13.10 SEDATIVE, HYPNOTIC OR ANXIOLYTIC ABUSE, UNCOMPLICATED: ICD-10-CM

## 2021-03-01 DIAGNOSIS — R41.82 ALTERED MENTAL STATUS, UNSPECIFIED: ICD-10-CM

## 2021-03-01 DIAGNOSIS — F11.10 OPIOID ABUSE, UNCOMPLICATED: ICD-10-CM

## 2021-03-01 PROCEDURE — 99284 EMERGENCY DEPT VISIT MOD MDM: CPT

## 2021-03-01 NOTE — ED PROVIDER NOTE - CLINICAL SUMMARY MEDICAL DECISION MAKING FREE TEXT BOX
Patient presenting with acute AMS s/p Xanax and heroin use. No trauma or injuries noted. History and ROS limited due to altered state.  No evidence of head or extremity trauma. Will observe for clinical sobriety.

## 2021-03-01 NOTE — ED PROVIDER NOTE - OBJECTIVE STATEMENT
34 y/o male BIB EMS from the street for Xanax and heroin use. Given Narcan 8mg IN by [employer]. No acute medical complaints or apparent trauma noted. Unable to cooperate with remainder of history due to clinical condition/AMS.

## 2021-03-08 ENCOUNTER — EMERGENCY (EMERGENCY)
Facility: HOSPITAL | Age: 36
LOS: 1 days | Discharge: HOME | End: 2021-03-08
Attending: EMERGENCY MEDICINE | Admitting: EMERGENCY MEDICINE
Payer: MEDICAID

## 2021-03-08 VITALS
TEMPERATURE: 98 F | OXYGEN SATURATION: 96 % | HEIGHT: 63 IN | RESPIRATION RATE: 18 BRPM | DIASTOLIC BLOOD PRESSURE: 88 MMHG | SYSTOLIC BLOOD PRESSURE: 135 MMHG | HEART RATE: 72 BPM

## 2021-03-08 DIAGNOSIS — F11.10 OPIOID ABUSE, UNCOMPLICATED: ICD-10-CM

## 2021-03-08 DIAGNOSIS — R41.82 ALTERED MENTAL STATUS, UNSPECIFIED: ICD-10-CM

## 2021-03-08 LAB — GLUCOSE BLDC GLUCOMTR-MCNC: 152 MG/DL — HIGH (ref 70–99)

## 2021-03-08 PROCEDURE — 99284 EMERGENCY DEPT VISIT MOD MDM: CPT

## 2021-03-08 RX ADMIN — Medication 25 MILLIGRAM(S): at 16:59

## 2021-03-08 NOTE — ED PROVIDER NOTE - OBJECTIVE STATEMENT
35 male, hx of polysubstance abuse, presents after using some heroin and getting IN narcan by EMS. Pt also feels "shaky" from alcohol withdrawal symptoms. no tongue fasciculations, no ataxia, no visible tremors. no trauma

## 2021-03-08 NOTE — ED ADULT TRIAGE NOTE - CHIEF COMPLAINT QUOTE
pt biba from street s/p found in respiratory arrest by [employer] and given Narcan 2mg IN arrives awake and alert and answering questions admits using several substances including heroin today, able to ambulate, several presentations to this ED for same

## 2021-03-08 NOTE — ED ADULT NURSE NOTE - OBJECTIVE STATEMENT
Pt BIBA from street for AMS. As per EMS, [facility] police found pt in respiratory distress and administered 2mg IN Narcan. Pt arrives to ED A&Ox4, ambulating with steady gait and requesting DC. Pt well known to ED for polysubstance abuse, admits to using heroin today and 'other stuff'. No visible injuries or traumas noted. Pt requesting Librium 'for the shakes', no visible tremors noted, CIWA is 0.

## 2021-03-08 NOTE — ED PROVIDER NOTE - PATIENT PORTAL LINK FT
You can access the FollowMyHealth Patient Portal offered by [facility] by registering at the following website: [email or web address]. By joining [employer]’s FollowMyHealth portal, you will also be able to view your health information using other applications (apps) compatible with our system.

## 2021-03-22 NOTE — ED PROVIDER NOTE - CONSTITUTIONAL APPEARANCE HYGIENE, MLM
PROGRESS NOTE



DATE:  03/22/2021



SUBJECTIVE: Patient is seen and examined this morning at the bedside in the

Intensive Care Unit. She was transferred to the ICU overnight because of

persistent hypotension with blood pressures 70's/30's early this morning. She

subsequently had a central line placed and is now on Levophed pressor support.

Her antimicrobials were also broadened and she is now on Meropenem along with

Vancomycin. Chest x-ray this morning shows large left pleural effusion with

mediastinal shift to the right. The patient, herself, denies any complaints at

the time of my visit, significantly denies shortness of breath at rest. 



OBJECTIVE: 

VITAL SIGNS: Temperature 98.1, pulse 77, respiratory rate 18, blood pressure 88

to 105 systolic/diastolic in the 50's, saturating 100% on 1 liter nasal cannula.

INTAKE/OUTPUT: Intake yesterday was 2 liters. Urine output was 1330. Weight in

the bed scale today is 86.5 kg.

GENERAL: Patient is seen awake, alert, lying on her side (right lateral

recumbent), in no apparent distress. Appears older than stated age. 

HEENT: Extraocular muscles are intact. Tongue is moist. There is a central line

present in the right IJ. 

HEART: Sounds are regular. There is systolic murmur. There is trace leg

edema and 1+ pedal edema.

LUNGS: Show diminished breath sounds on the left, but no tachypnea. No

accessory muscle use. 

ABDOMEN: Soft. There are bowel sounds.

EXTREMITIES: Negative for cyanosis or clubbing. There is trace edema.

SKIN: Pallor. 



LABORATORY STUDIES:  Sodium 131, potassium 4.7, bicarbonate 24, BUN 49,

creatinine 2.2. Magnesium 2.4. Albumin 3.0. Lactic acid 0.8. Hemoglobin 8.3,

white count 2.8, platelets 66,000. 



IMAGING: Chest x-ray done this morning shows large left pleural effusion with

mediastinal shift to the right. The right lung is clear. 



INPATIENT MEDICATIONS: Her Ciprofloxacin was discontinued. She is on a Levophed

infusion at 3 mcg. She is on Meropenem 500 mg I.V. b.i.d. Vancomycin 1 gram

I.V. daily. Her Heparin subq was discontinued. Midodrine and Octreotide were

discontinued. The remainder of her medications are unchanged as compared to

yesterday.



PROBLEMS:

  1.  Acute renal failure (nonoliguric) superimposed on CKD stage 3B: Patient

      has a history of recurrent acute kidney injuries and her present acute

      kidney injury is in the setting of sepsis and hypotension. Her urine

      electrolytes show sodium and chloride both less than 10, indicating a

      prerenal picture. I would continue with Levophed pressor support at this

      time and suggest transfusing 1 unit packed red blood cells as well.

      Diuretics remain on hold and antimicrobials have been broadened. 

  2.  Alcoholic cirrhosis with decompensation with recurrent ascites and

      left-sided hepatic hydrothorax requiring therapeutic drainage every

      couple of weeks: Chest x-ray today shows a large left-sided effusion with

      mediastinal shift and she is for drainage tomorrow per the pulmonary

      service on March 23rd. Her effusion is likely to be infected and she is

      now on Vancomycin along with Meropenem and she is also requiring pressor

      support. She has minimal to no oxygen requirements at present, and is not

      symptomatic at rest. 

  3.  Chronic hypotension: The patient takes Midodrine chronically, but her

      blood pressures overnight deteriorated significantly with systolic in the

      70's. A lactic acid was negative. She is now on Levophed pressor support.

      She has a central line, and daily CVP is requested.

  4.  History of hepatic encephalopathy: She is on Rifaximin. She has not

      gotten lactulose recently. Ammonia level is ordered for tomorrow. 

MTDD UNKEMPT

## 2021-03-31 NOTE — ED PROVIDER NOTE - TEMPLATE
Eucrisa Pregnancy And Lactation Text: This medication has not been assigned a Pregnancy Risk Category but animal studies failed to show danger with the topical medication. It is unknown if the medication is excreted in breast milk. Abdominal Pain, N/V/D

## 2021-04-13 NOTE — ED ADULT NURSE NOTE - SUICIDE SCREENING QUESTION 1
Per [employer] [place]:   Lisinopril last filled on 12/29/20 for 90 day supply. 0 refills remaining. Pravastatin last filled on 12/10/20 for 90 day supply. 1 refill remaining. Called and spoke with patient briefly - states he is busy and to call back in about 10 minutes. No

## 2021-05-04 NOTE — ED ADULT TRIAGE NOTE - CCCP TRG CHIEF CMPLNT
Group Topic: BH Process Group     Date: 5/4/2021  Start Time: 11:00 AM  End Time: 11:50 AM  Facilitators: Danya Rodriguez LPC    Focus: Recovery  Number in attendance: 4    Each pt processed personal concerns, stressors, and therapeutic assignments. Therapist and peer feedback is welcomed and given. Maladaptive coping skills are challenged with healthier coping skills.      Method: Group  Attendance: Present  Participation: Minimal  Patient Response: Non-verbal and Quiet  Mood: Anxious  Affect: Type: Anxious   Range: Blunted/flat   Congruency: Congruent   Stability: Stable  Behavior/Socialization: Guarded  Thought Process: Tracking  Task Performance: Follows directions  Patient Evaluation: Encouragement - needs prompts    Pt needed encouragement to talk in process. He initially stated that there is nothing he wants to work on, but writer talked with pt that he is not just here just to be here and that there must be a reason. He further stated he needs to work on anger. He identified taking a walk, listen to music, and playing video games are helpful. He identified he struggles to remember to use his coping skills and writer discussed how identifying his warning signs in the moment can be a helpful reminder for him to use coping skills. He identified that his jaw starts to lock and foot starts to tap when he is becoming angry. Encouraged pt to work on paying attention to warning signs in the moment and to focus on using coping skills immediately. He appeared receptive to feedback given. Goal for the evening is to become a better person. Pt denied any safety concerns. Pt was out of group for 10 minutes as he reported he has to use the bathroom.    Danya Rodriguez LPC  5/4/21   medical evaluation

## 2021-05-07 NOTE — PROGRESS NOTE BEHAVIORAL HEALTH - MOOD
07-May-2021
Depressed/Anxious
Depressed/Anxious
Anxious/Depressed
Depressed/Anxious

## 2021-05-12 NOTE — ED BEHAVIORAL HEALTH ASSESSMENT NOTE - NS ED BHA MED ROS CONSTITUTIONAL SYMPTOMS
Verbal - The patient responds to verbal stimuli by opening their eyes when someone speaks to them. The patient is not fully oriented to time, place, or person.
No complaints

## 2021-12-07 NOTE — ED BEHAVIORAL HEALTH ASSESSMENT NOTE - NS ED BHA BENZODIAZEPINES
"  [facility] Follow Up Note     Date: 12/7/2021 / Time: 3:25 PM    Patient ID:   Name:             Johny Toledo   YOB: 1970  Age:                 51 y.o.  male   MRN:               [id number]      Thank you for requesting a sleep medicine consultation on Johny Toledo at the sleep center. He presents today with the chief complaints of narcolepsy with cataplexy follow up.     HISTORY OF PRESENT ILLNESS:       He is a previous patient of Dr. Koehler and [facility].  He was diagnosed with narcolepsy with cataplexy on 7/10/2006 via MSLT.  We do not have access to his PSG prior to the MSLT.  Based on the MSLT the sleep latency was 13.5 minutes and REM latency was 4.8.  He had SOREMPS on his first and second nap. his initial symptoms were excessive daytime sleepiness and symptoms of cataplexy. His cataplexy symptoms include \"extremely tiered\" or \"dayjavu\". The symptoms of cataplexy are infrequent. He took Sunosi 150 mg at 8:30. As per pt he did not feel any benefit from the sunosi. He was taking multiple adderall xr 30 per day and ran out in few week. He has tried nuvigil in past and unsure if it was help in past. EPWORTH is 17/24.     REVIEW OF SYSTEMS:       Constitutional: Denies fevers, Denies weight changes  Eyes: Denies changes in vision, no eye pain  Ears/Nose/Throat/Mouth: Denies nasal congestion or sore throat   Cardiovascular: Denies chest pain or palpitations   Respiratory: Denies shortness of breath , Denies cough  Gastrointestinal/Hepatic: Denies abdominal pain, nausea, vomiting, diarrhea, constipation or GI bleeding   Genitourinary: Deniesdysuria or frequency  Musculoskeletal/Rheum: Denies  joint pain and swelling   Skin/Breast: Denies rash,   Neurological: Denies headache, confusion, memory loss or focal weakness/parasthesias  Psychiatric: denies mood disorder   Sleep: + EDS    Comprehensive review of systems form is reviewed with the patient and is attached in the EMR.     PMH:  " has a past medical history of Abnormal electrocardiogram (7/27/2011), Bronchitis, Chronic systolic congestive heart failure (HCC) (7/27/2011), Congestive heart failure (HCC), Dizziness (3/31/2011), Fall, Heart valve disease, Hyperlipidemia, Hypertension, Male erectile disorder (8/9/2011), Mixed hyperlipidemia (8/9/2011), Narcolepsy with cataplexy, Pulmonary embolism (HCC), and STEMI (ST elevation myocardial infarction) pk  trop 150 POBA LAD and DIAG med rx. (12/18/2010).  MEDS:   Current Outpatient Medications:   •  amphetamine-dextroamphetamine ER (ADDERALL XR, 30MG,) 30 MG XR capsule, Take 30 mg by mouth every morning., Disp: , Rfl:   •  ezetimibe (ZETIA) 10 MG Tab, Take 10 mg by mouth every day., Disp: , Rfl:   •  furosemide (LASIX) 20 MG Tab, Take 20 mg by mouth every day., Disp: , Rfl:   •  atorvastatin (LIPITOR) 40 MG Tab, Take 40 mg by mouth., Disp: , Rfl:   •  ENTRESTO 49-51 MG Tab tablet, TAKE 1 TABLET BY MOUTH TWICE A DAY**PA, Disp: , Rfl: 3  •  warfarin (COUMADIN) 7.5 MG Tab, TAKE 1 & 1/2 TABLETS BY MOUTH ONCE DAILY, Disp: 45 Tab, Rfl: 6  •  carvedilol (COREG) 25 MG Tab, Take 25 mg by mouth 2 times a day, with meals., Disp: , Rfl:   •  amlodipine (NORVASC) 10 MG TABS, TAKE 1 TABLET BY MOUTH EVERY DAY, Disp: 30 Tab, Rfl: 6  •  aspirin EC (ECOTRIN) 81 MG TBEC, Take 1 Tab by mouth every day., Disp: 30 Each, Rfl: 11  •  zolpidem (AMBIEN CR) 12.5 MG CR tablet, Take 1 Tab by mouth at bedtime as needed for Sleep (insomnia). (Patient not taking: Reported on 5/22/2019), Disp: 30 Tab, Rfl: 3  •  enalapril (VASOTEC) 20 MG tablet, TAKE 1 TABLET BY MOUTH TWICE A DAY (Patient not taking: Reported on 2/8/2019), Disp: 60 Tab, Rfl: 3  ALLERGIES: No Known Allergies  SURGHX:   Past Surgical History:   Procedure Laterality Date   • [facility] CARDIAC CATH  12/2010   • OTHER  tonsilectomy   • OTHER CARDIAC SURGERY      catherization 12/2010, 12/2008   • TONSILLECTOMY AND ADENOIDECTOMY       SOCHX:  reports that he quit smoking about  "11 years ago. His smoking use included cigarettes. He has a 7.50 pack-year smoking history. He has never used smokeless tobacco. He reports previous alcohol use. He reports that he does not use drugs..  FH:   Family History   Problem Relation Age of Onset   • Sleep Apnea Neg Hx          Physical Exam:  Vitals/ General Appearance:   Weight/BMI: Body mass index is 26.47 kg/m².  /90 (BP Location: Left arm, Patient Position: Sitting, BP Cuff Size: Adult)   Pulse (!) 105   Resp 16   Ht 1.676 m (5' 6\")   Wt 74.4 kg (164 lb)   SpO2 97%   Vitals:    12/07/21 1516   BP: 142/90   BP Location: Left arm   Patient Position: Sitting   BP Cuff Size: Adult   Pulse: (!) 105   Resp: 16   SpO2: 97%   Weight: 74.4 kg (164 lb)   Height: 1.676 m (5' 6\")       Pt. is alert and oriented to time, place and person. Cooperative and in no apparent distress.       Constitutional: Alert, no distress, well-groomed.  Skin: No rashes in visible areas.  Eye: Round. Conjunctiva clear, lids normal. No icterus.   ENMT: Lips pink without lesions, good dentition, moist mucous membranes. Phonation normal.  Neck: No masses, no thyromegaly. Moves freely without pain.  CV: Pulse as reported by patient  Respiratory: Unlabored respiratory effort, no cough or audible wheeze  Psych: Alert and oriented x3, normal affect and mood.     ASSESSMENT AND PLAN     1.  Narcolepsy with cataplexy: He is currently on excessive amount of Adderall.  He was on  on Adderall XR 30 mg 2 to 3 tablets twice daily to 3 times daily.  Since he is exceeding the maximum dose of Adderall we discussed alternative medications including Sunosi and Wakix.  After detailed discussion we decided to try Sunosi.However it for not effective there fore increasing the dose to adderall Xr 30 mgs BID.  If for some reason her his symptoms are not well controlled on Adderall XR 60 mg we might try Wakix with combination of Adderall.  However it was clearly conveyed to the patient that we will " not exceed more than 60 mg of Adderall a day.     We also discussed the importance of adequate sleep time which can help with his EDS.  Currently he is sleeping only 6 hours per night because he would like to stay up not because of insomnia.  Recommended short afternoon nap.     Risk of drowsy driving and work-related injuries were discussed in detail.  As per patient the DMV is aware of his diagnosis.     2. Regarding treatment of other past medical problems and general health maintenance,  He is urged to follow up with PCP.      None known

## 2022-01-19 NOTE — ED ADULT TRIAGE NOTE - STATUS:
Pt ate applesauce tolerated well complained of throat sore and did not want anything else to eat at this time. Will order pt dinner. Pt needs assistance with eating. Applied

## 2022-06-12 NOTE — ED ADULT TRIAGE NOTE - CHIEF COMPLAINT QUOTE
anxiety and panic attack this morning with slight chest discomfort, had seizures on and off for 2 days, had heroin and weeds yesterday morning.
none

## 2022-06-13 NOTE — ED PROVIDER NOTE - ATTESTATION, MLM
16 Hour(s) 45 Minute(s) I have reviewed and confirmed nurses' notes for patient's medications, allergies, medical history, and surgical history.

## 2022-06-16 NOTE — ED ADULT NURSE NOTE - SUICIDE SCREENING QUESTION 2
"Subjective:       Patient ID: Jessie Araujo is a 17 y.o. female.    Chief Complaint:  Annual Exam (New pt; irregular periods)      History of Present Illness  - here for annual. This last period is a little late. Otherwise they occur monthly and last 4 - 5 days. She then has a little "drippage" for a couple of days where she only needs a light pad. Periods are helped with a "heating pad and chocolate." Not sexually active. Declines need for ocps for cycle or birth control.  - mom was recently diagnosed with Daniel's Disease. Patient is considering genetic testing at age 18.     Past Medical History:   Diagnosis Date    ADHD (attention deficit hyperactivity disorder)     Scoliosis        Past Surgical History:   Procedure Laterality Date    TYMPANOSTOMY TUBE PLACEMENT          Family History   Problem Relation Age of Onset    Spink's disease Mother     Hypertension Father     Spink's disease Maternal Grandmother     Other Maternal Grandmother         Spink's chorea    Other Paternal Grandmother     Colon cancer Paternal Grandfather     Scoliosis Paternal Aunt     Hypertension Paternal Aunt     Hypertension Paternal Uncle     Birth defects Neg Hx     Asthma Neg Hx     Diabetes Neg Hx         Social History     Socioeconomic History    Marital status: Single   Tobacco Use    Smoking status: Never Smoker    Smokeless tobacco: Never Used   Substance and Sexual Activity    Alcohol use: Never    Drug use: Never    Sexual activity: Never     Birth control/protection: None   Social History Narrative    BIRTH HISTORY: Born to 33-year-old primigravida at term by      section for breech presentation.         FAMILY HISTORY: Spink's Chorea--MGM        PMH:1. Chronic OM S/P BMT  2. Abdominal wall MRSA abscess--admitted  3. possible ADHD -- evaluated by Dr. Seo  -- mom feels that her school and home performance is much improved        Pt lives between mom " "and dad    No siblings    2 cats at moms house and 3 dogs at dads house    No smoking in the home    Going to 8th grade [employer]                   Objective:     Vitals:    06/16/22 1023   BP: 90/68   Weight: 45.8 kg (100 lb 15.5 oz)   Height: 5' 5.16" (1.655 m)       Physical Exam:   Constitutional: She appears well-developed and well-nourished. She is cooperative. No distress.                           Neurological: She is alert.          Assessment/ Plan:          Jessie was seen today for annual exam.    Diagnoses and all orders for this visit:    Encounter for gynecological examination    - discussed genetic testing and pros/cons with patient and stepmom.   - patient will reach out with any questions/concerns.    Follow up in about 6 months (around 12/16/2022) for follow up.    As of April 1, 2021, the Cures Act has been passed nationally. This new law requires that all doctors progress notes, lab results, pathology reports and radiology reports be released IMMEDIATELY to the patient in the patient portal. That means that the results are released to you at the EXACT same time they are released to me. Therefore, with all of the patients that I have I am not able to reply to each patient exactly when the results come in. So there will be a delay from when you see the results to when I see them and have time to come up with a response to send you. Also I only see these results when I am on the computer at work. So if the results come in over the weekend or after 5 pm of a work day, I will not see them until the next business day. As you can tell, this is a challenge as a physician to give every patient the quick response they hope for and deserve. So please be patient!   Thanks for your understanding and patience.    " No

## 2022-06-17 NOTE — ED BEHAVIORAL HEALTH ASSESSMENT NOTE - SUMMARY
35 YO M with polysubstance use d/o and known history of malingering for a place to stay, with reported bipolar that is more likely a substance induced mood d/o presents with SI but has no plan or intent.     1)Discharge as pt is not a danger to self or others at this time. He is malingering again for a place to stay.     2)Gave pt appointment for [facility] psych clinic tomorrow at 8:30 am, [address] [place], [place] [address], [phone] Gabapentin Pregnancy And Lactation Text: This medication is Pregnancy Category C and isn't considered safe during pregnancy. It is excreted in breast milk.

## 2022-11-22 NOTE — ED ADULT NURSE NOTE - NS ED NOTE ABUSE SUSPICION NEGLECT YN
Outpatient Wound Clinician Visit Note    Chief Complaint:   Chief Complaint   Patient presents with   • Wound     The below orders were released and performed during the visit:   Orders Placed This Encounter   • Complete Wound Care     Avoid pressure to healed wound.  Increase protein intake.  Keep area clean and dry, change dressing prn.  Apply Aquaphor  Daily and PRN  to healed buttocks area     Order Specific Question:   Diagnosis     Answer:   Pressure injury (specify)     Order Specific Question:   Pressure Injury (specify)     Answer:   Stage 3     Order Specific Question:   Dressing change(s) to be done by     Answer:   Wound Care Team     Order Specific Question:   Dressing change(s) to be done by     Answer:   Other (specify)     Order Specific Question:   Other (specify)     Answer:   by wife at home     Order Specific Question:   Dressing frequency     Answer:   PRN     Order Specific Question:   Wound location     Answer:   lower back     Order Specific Question:   Dressing change(s) to be done using     Answer:   Clean Technique     Order Specific Question:   Clean wound with     Answer:   Normal saline     Order Specific Question:   Dressing type     Answer:   Foam   • Verify informed consent     Obtain patient consent for Wound/Ulcer debridements, biopsy, excision and closure, incision and drainage     Home Care Service:  No  Type of Supervision: Patient seen by provider  Was care transitioned to this department today? No   Was care transitioned from this department today?  No Hospitalization within the last 30 days (if yes, date of discharge)? No   Arrival Disposition: Wheelchair  Transfer Assist: 1 person  Special Needs: Special Needs List: none  Additional POCT Services Provided: None  Departure Instruction: Simple D/C (Rx, simple instructions)  Comments:  Patient arrival information, vital signs and dressing removed by staff member noted.  Staff obtained consents, records, test results or  processed orders.  Patient and Caregiver education was given regarding procedures/therapy planned.  A review of the H&P and other pertinent information was done.  Departure Disposition: Depart with assistance and Home self care or family care  Today's visit consisted of evaluation of the wound   Wound is healed as of today   Pressure relief.  Keep clean and dry  Cleanse wound with saline and gauze.  Apply bordered foam to protect the wound for the next 2 weeks. Change PRN  Continue to keep pressure off gluteal area, keep clean and dry and moisturize daily with aquaphor.  May call with any questions   Patient and wife verbalized understanding with Home instructions.   No

## 2023-01-06 NOTE — ED PROVIDER NOTE - CROS ED ENMT ALL NEG
If you develop signs and symptoms of  labor including but not limited to regular uterine contractions every 5-7 minutes for 1 hour, vaginal bleeding or leakage of fluid please contact our office and/or seek immediate care. Thanks for coming to see us today and letting us take care of you! negative...

## 2023-02-17 NOTE — CONSULT NOTE ADULT - CONSULT REQUESTED DATE/TIME
SUBJECTIVE:  This is an 86 year old established patient with AMD here for a 6 month follow up. Patient denies any changes to vision since last visit. He has no ocular problems at this time. He has history of a vitrectomy with gas bubble with Dr. Ruiz in left eye. He has lattice degeneration left eye and had loose epithelium scraped from the right eye in 2006. He has had bilateral cataract surgery and bilateral YAG Capsulotomy. He has Dermatochalasis and left and right lid papillomas.      Patient Active Problem List   Diagnosis   • Intermediate stage nonexudative age-related macular degeneration of both eyes   • Obstructive sleep apnea (adult) (pediatric)   • Hyperlipidemia   • Essential hypertension   • Varicose veins of bilateral lower extremities with other complications   • Dermatochalasis of both upper eyelids   • Lattice degeneration of left retina     Current Outpatient Medications   Medication Sig Dispense Refill   • doxazosin (CARDURA) 4 MG tablet TAKE ONE TABLET BY MOUTH once daily 90 tablet 0   • simvastatin (ZOCOR) 10 MG tablet TAKE ONE TABLET BY MOUTH EVERY NIGHT AT BEDTIME 90 tablet 0   • lisinopril (ZESTRIL) 10 MG tablet TAKE ONE TABLET BY MOUTH once daily 90 tablet 0   • dilTIAZem (CARDIZEM CD) 240 MG 24 hr capsule TAKE ONE CAPSULE BY MOUTH once daily 90 capsule 0   • aspirin 325 MG EC tablet TAKE ONE TABLET BY MOUTH once daily 30 tablet 12   • Multiple Vitamins-Minerals (EYE VITAMINS PO) Indications: aerds 2     • clobetasol (TEMOVATE) 0.05 % ointment Apply twice daily as needed       No current facility-administered medications for this visit.     ALLERGIES:   Allergen Reactions   • Petrolatum Other (See Comments)     pt states that his wounds will not heal with vaseline         OBJECTIVE:  Vision    OD: with correction 20/40 PH NI    OS: with correction 20/30-2      Near both eyes:   J1    Lids/Adnexa    OD: upper lid dermato. right upper lid and right lower lid  papilloma    OS: upper lid  dermato. Lower lid normal  Pupils    OD: minimally reactive    OS: minimally reactive   EOM    OD: full    OS: full  Alignment    - orthophoric at distance and near  Confrontation VF's   OD: normal   OS: normal     Tonometry    Method  Goldmann with Flurox   Time: 10:07 AM   RIGHT:  17 mm Hg  LEFT:    17 mm Hg    Dilated                      Bilateral - Drops: 1% Mydriacyl and 2.5% Neosynephrine    Patient offered sunglasses and accepted.  Reviewed the effects of dilation and the need for sunglasses  Electronically Signed by:    Kiana Mosley 2/17/2023 10:09 AM      SLIT LAMP EXAM  Lids/Adnexa    OD: right upper lid and right lower lid papilloma    OS: left lower lid cyst nasal   Conjunctiva    OD: - nl   OS: - normal  Cornea    OD: - clear   OS: - clear  Tear Film    OD: good   OS: good  Anterior Chamber    OD: normal deep and quiet   OS: normal deep and quiet  Iris    OD: - normal   OS: - normal  Lens    OD: posterior chamber lens implant and yag capsulotomy    OS: posterior chamber lens implant and yag capsulotomy     DILATED FUNDUS EXAM   Disc    OD: 0.1 -    OS: 0.1 -   Vessels    OD: normal    OS: normal  Macula    OD: 2+ pigment mottling and 1+ round small central atrophy.   OS: trace pigment mottling, 2+ drusen  Periphery    OD: normal -   OS: lattice degeneration superior with lots of laser    ASSESSMENT:  Age related macular degeneration in both eyes, moderate right, mild left,  has edema right side from optical coherence tomography.  right upper lid and right lower lid papilloma. stable  left lower lid cyst stable.  Lattice degeneration in the left eye. s/p laser. stable   dermatochalasis both sides, stable. moderate.    PLAN:  MACULAR DEGENERATION VITAMINS  Pick 1 of 2 options, Brand name or Generic.    Brand Name Vitamin:   PreserVision AREDS 2 Vitamin & Mineral Supplement 120 Count Soft Gels: Take 1 soft gel in the morning and 1 soft gel at night.    Monitor vision and, if there's any changes in either  26-Dec-2018 22:57 eye such as visual distortions, waviness, or blurriness, patient is to call back right away.    OCT of the macula today.    see retina.     Alberto Leslie MD 2/17/2023         OCT RESULTS    Diagnosis:  macular degeneration     Reliability:  Right:  fair  Left:  fair    Results:  Right:  lumpy rpe, edema just superior nasal   Left:  lumpy rpe     Assessment and Plan:  macular degeneration. worse right side. see retina.    Alberto Leslie MD 2/17/2023

## 2023-03-03 NOTE — ED BEHAVIORAL HEALTH ASSESSMENT NOTE - NS ED BHA PLAN TR BH CONTACTED FT
Pt is calling, she already talked to Dr Sheldon Garcia, but she wants to make sure that her Budesonide gets sent over to the [facility] [place] [facility] [place] today before they close at 1600 
not available

## 2023-04-20 NOTE — ED ADULT NURSE NOTE - NS ED NURSE DISCH DISPOSITION
AT F/U APPOINTMENT CALL PATIENT REQUESTING HER RX REFILLS FROM TODAY BE SENT TO THE [facility].  SHE HAS AN APPOINTMENT THERE TOMORROW  
Discharged

## 2023-04-28 NOTE — ED ADULT NURSE NOTE - PAIN: PRESENCE, MLM
Eat and drink nothing after midnight the night before the planned procedure. Stop all aspirin, ibuprofen, aleve (tylenol/acetaminophen is ok) for seven days prior to the procedure.
denies pain/discomfort

## 2023-06-10 NOTE — ED PROVIDER NOTE - CONSTITUTIONAL, MLM
Received message from patient's wife. 2 identifiers confirmed. Wife states that patient was supposed to received \"new medication\" for his neuropathy from yesterday's visit. Chart reviewed. I see no mention of medication for this and discussed with wife. They understand and will contact Dr. Toney Wellington on Monday to get Rx.
- - -

## 2023-07-10 NOTE — ED ADULT NURSE NOTE - NS ED NURSE DISCH DISPOSITION
Skilled services/Home bound verification:      Skilled Reason for admission/summary of clinical condition: 45 y.o.  followed by Dr. Tuyet Betancur as an outpatient. Hospital Course: Mr. Luisa Lau was admitted to the hospital on 6/30/2023  5:04 PM for wound infection. The patient underwent the above named procedure. Remained hemodynamically stable during their hospitalization. Received appropriate pre and post  operative prophylactic antibiotics as well as SCDs. This patient is homebound for the following reasons Requires considerable and taxing effort to leave the home  and Only leaves the home for medical reasons or [employer] services and are infrequent and of short duration for other reasons. Skilled care: Disease and medication management, completed assessment, PICC line dressing due 7/13/23, flush ports with 10 ml NS, flushed with no difficulty and positive for blood returns. Administered Cefazolin 2g in 20 ml NS IV push @ 10 min via white port within 5 min's. Lower back surgical wound covered wit Aquacel Ag surgical dressing placed yesterday. Dressing dry, intact and no drainage not due to be changed. Completed/explained [facility] admission packet, discussed POC, SNV freq, PICC line dressing, weekly labs and d/c plan. Patient response to procedure performed:  Pt tolerated well during the procedure with no c/o. Caregiver: Caregiver/wife available to assist with ADL's, daily meals, run errands, able to assist with IV push (Cefazolin) and po medications, run errands and accompany to MD appt prn. Medications reconciled and Methocarbamol not at home, waiting to be filled from pharmacy. Discussed taking pain medication Dilaudid on timely basis to achieved good pain relief. Monitor if pain is increased and to notify MD.  The following education was provided regarding medications:  Cefazolin s/e: heart burn, diarrhea, N/V, gas, loss of appetite. Dilaudid s/e: constipation.  N/V, sweating, redness, Left without being seen (saw a nurse but never saw a physician or midlevel provider)

## 2023-11-14 NOTE — ED BEHAVIORAL HEALTH ASSESSMENT NOTE - FAMILY HISTORY OF PSYCHIATRIC ILLNESS / SUICIDALITY
None known
no chest pain/no palpitations/no dyspnea on exertion/no paroxysmal nocturnal dyspnea/no peripheral edema

## 2023-12-11 NOTE — ED ADULT NURSE NOTE - PRO INTERPRETER NEED 2
on 24/7 AVAPS and edaravone at home  - became SOB w/ increased secretions  - intubated in ED 12/2   - plan as above English

## 2023-12-12 NOTE — BH SAFETY PLAN - DISTRACTION PLACE 1
NA Meetings CT A/P with extensive decubitus ulcer involving the right posterior gluteal and pelvic region with extension to bone, bony erosion of the ischium compatible with osteomyelitis  -ABX per ID.  -now off antibtiocs

## 2023-12-28 NOTE — ED ADULT NURSE NOTE - GASTROINTESTINAL ASSESSMENT
Individual Follow-Up Form    12/28/2023    Quit Date: To be determined    Clinical Status of Patient: Inpatient    Length of Service: 30 minutes    Comments: Smoking cessation education note: Pt is a 2 to 3 pk/day cigarette smoker x 49 yrs. Order requested for 21 mg nicotine patch Q day. Pt states ready to quit smoking. Ambulatory referral to Smoking Cessation clinic for virtual appointment following hospital discharge.     Diagnosis: F17.210        
WDL

## 2024-01-09 NOTE — ED ADULT TRIAGE NOTE - PRO INTERPRETER NEED 2
Key Kumar, DO     English

## 2024-01-26 NOTE — ED PROVIDER NOTE - NSDCPRINTRESULTS_ED_ALL_ED
No indicators present Patient requests all Lab and Radiology Results on their Discharge Instructions

## 2024-02-09 NOTE — ED PROVIDER NOTE - NS ED MD DISPO ADMIT [facility] PALLIATIVE CARE
February 9, 2024       No Recipients    Patient: Faby Le   YOB: 1947   Date of Visit: 2/9/2024     Dear Leonor Adam MD:       Thank you for referring Faby Le to me for evaluation. Below are the relevant portions of my assessment and plan of care.    If you have questions, please do not hesitate to call me. I look forward to following Faby along with you.         Sincerely,        Cr Herrera MD        CC:   No Recipients    Cr Herrera MD  02/09/24 0938  Sign when Signing Visit  CC--sick sinus syndrome and pacemaker in situ      Sub  77-year-old pleasant patient has dual-chamber pacemaker for tachybradycardia syndrome and sick sinus syndrome.  He recently had TIA type of symptoms and she was on rivaroxaban 15 mg and I discussed with primary care physician to increase rivaroxaban to 20 mg.  Patient has history of atrial fibrillation with history of hypertension and sleep apnea and lower extremity edema  She underwent cardiac catheterization in 2022 revealing mild obstructive disease left to medical management with normal LV systolic function.  She has history of anemia and workup revealed iron deficiency with normal B12 and patient was treated with iron supplementation    Patient had TIA type of symptoms 2 weeks ago with speech abnormality and CT head was within normal limits and I was notified and after that Xarelto has been increased to 20 mg    Patient also has intermittent symptoms of fatigue and weakness and dizziness      Past Medical History:   Diagnosis Date   • Afib    • Ankle pain    • Arthritis    • Chronic kidney disease    • Coronary artery disease    • Depression    • Gout    • History of loop recorder 04/25/2023    current   • Hyperglycemia    • Hyperlipidemia    • Hypertension    • Low back pain    • Morbid obesity    • Sleep apnea    • Vitamin D deficiency      Past Surgical History:   Procedure Laterality Date   • ANKLE FUSION      2017-left   •  CARDIAC CATHETERIZATION     • CARDIAC CATHETERIZATION Right 8/29/2022    Procedure: Left Heart Cath;  Surgeon: Ratna Zavala MD;  Location: [facility] CATH INVASIVE LOCATION;  Service: Cardiovascular;  Laterality: Right;   • CARDIAC ELECTROPHYSIOLOGY PROCEDURE Left 7/3/2023    Procedure: Pacemaker DC new [place] Notified;  Surgeon: Cr Herrera MD;  Location: [facility] CATH INVASIVE LOCATION;  Service: Cardiovascular;  Laterality: Left;   • CARPAL TUNNEL RELEASE     • CATARACT EXTRACTION     • CUBITAL TUNNEL RELEASE     • HYSTERECTOMY     • REPLACEMENT TOTAL KNEE      left 2008   • ROTATOR CUFF REPAIR     • TOTAL KNEE ARTHROPLASTY Right 5/4/2023    Procedure: TOTAL KNEE ARTHROPLASTY WITH CORI ROBOT;  Surgeon: Chino Herrera II, MD;  Location: [facility] MAIN OR;  Service: Robotics - Ortho;  Laterality: Right;         Physical Exam    General:      well developed, well nourished, in no acute distress.    Head:      normocephalic and atraumatic.    Eyes:      PERRL/EOM intact, conjunctivae and sclerae clear without nystagmus.    Neck:      no  thyromegaly, trachea central with normal respiratory effort  Lungs:      clear bilaterally to auscultation.    Heart:       regular rate and rhythm, S1, S2 without murmurs, rubs, or gallops  Skin:      intact without lesions or rashes.    Psych:      alert and cooperative; normal mood and affect; normal attention span and concentration.          Assessment plan    Recent labs include LDL of 46 , hemoglobin 11.7 and occult blood in the stool was negative  Platelets are normal  Recent TSH is normal  Recent CT head without abnormality  Iron deficiency anemia on iron supplementation followed by primary care  Dual-chamber pacemaker in situ with a low burden of AF and patient is taking appropriate amount of Xarelto  Hypothyroidism supplemented with levothyroxine  Hyperlipidemia treated with atorvastatin  Hypertension well-controlled with a combination of  spironolactone/metoprolol    Medications reviewed and follow-up appointments made    Pacemaker interrogated today and patient had few days of long episodes of atrial arrhythmia sometimes lasting up to 6 to 10 hours and patient was symptomatic during those days.  Therapeutic options for atrial arrhythmia discussed and patient scheduled for AF ablation since patient is symptomatic.  Risks and benefits and outcomes educated and orders placed  Cardiac monitor is canceled since patient has pacemaker in situ which was discussed with the patient and family    Electronically signed by Cr Herrera MD, 02/09/24, 9:37 AM EST.     NONE

## 2024-03-11 NOTE — ED PROVIDER NOTE - DATE/TIME 1
Medication: sertraline (ZOLOFT) 50 MG tablet  passed protocol.   Last office visit date: 12/05/2023  Next appointment scheduled?: Yes   Number of refills given: 03         11-Apr-2019 05:39

## 2024-04-15 NOTE — ED BEHAVIORAL HEALTH ASSESSMENT NOTE - NS ED BHA REVIEW OF ED CHART AVAILABLE LABS REVIEWED
Pt arrived for a c/c of back pain,Pt states that he has a herniated disc in his lower back that started acting up today-pain started today.     VS noted and stable.Patient A&Ox4. Respirations easy and unlabored. Skin warm and dry and appropriate for ethnicity. No acute distress noted at this time. Patient placed on continuous telemetry and pulse ox upon arrival to room.      
Yes

## 2024-06-06 NOTE — BEHAVIORAL HEALTH ASSESSMENT NOTE - NSBHHPIREASON_PSY_A_CORE
Addended by: VERNA MCDONNELL on: 6/6/2024 11:10 AM     Modules accepted: Orders     Inpatient Psychiatry admission

## 2024-08-16 NOTE — ED PROVIDER NOTE - HIV OFFER
Noted. done  
Per patient nurse at [facility] he is taking ferrous sulfate 325 mg daily    Patient is also waiting for an order to be sent to [facility] for oxycodone BID PRN that was discussed when pcp did rounds at [facility] with patient  
Previously Declined (within the last year)

## 2024-12-26 NOTE — ED ADULT NURSE NOTE - DRUG PRE-SCREENING (DAST -1)
The patient is a 7y5m Male complaining of  Statement Selected Skin normal color for race, warm, dry and intact. No evidence of rash.

## 2025-01-30 NOTE — ED ADULT TRIAGE NOTE - ESI TRIAGE ACUITY LEVEL, MLM
2 crush medication (when feasible)/maintain upright posture during/after eating for 30 mins/oral hygiene/position upright (90 degrees)/small sips/bites allow for swallow between intakes/crush medication (when feasible)/maintain upright posture during/after eating for 30 mins/no straws/oral hygiene/position upright (90 degrees)/small sips/bites

## 2025-04-23 NOTE — ED ADULT NURSE NOTE - NSSISCREENINGSIGNS_ED_A_ED
Detail Level: Zone
anxiety/agitation/talking about suicide/purposeless- no reason for living/recklessness- risky acts/seeking lethal means/substance abuse ( +sbirt)/past suicide attempts/ family

## 2025-07-06 NOTE — ED PROVIDER NOTE - PRO INTERPRETER NEED 2
Family Medicine Note:     CC:   Chief Complaint   Patient presents with   • Office Visit   • Follow-up   • Refill Request       HPI: The patient is a 46 year old male presenting for depression follow-up. Pt has been struggling with anxiety and depression in the setting of financial stress and lack of social support. He is currently on Wellbutrin, Buspar, Lexapro, Atarax as needed. Has done 2 co-counseling sessions.       The patient presents for evaluation of depression, right leg pain, and glaucoma.    Depression  Financial instability  - He continues to experience feelings of depression.  - Patient is particularly stressed due to to concern that he might lose Medicaid and snap benefits.  - Is currently on Wellbutrin, buspirone, Lexapro, and hydroxyzine for sleep, all of which he finds beneficial.  - Is able to maintain food and water intake with the help of food stamps.  - Has not yet explored volunteering at local animal shelters due to issues with his phone.  - Does not feel the need for any changes in medication regimen at this time.  - Finds the sessions with Dr. Holley helpful and is open to continuing them.  - Patient is requesting forms to be filled out that state he is unable to work so that he can continue to get his government benefits    Right Leg Pain  - Approximately 2 weeks ago, experienced an episode of severe pain in his right leg upon waking up, which lasted for a week.  - The pain was so intense that it rendered him unable to walk or stand, forcing him to crawl around the house.  - The pain was constant and only subsided when he fell asleep.  - It was described as a cramping sensation that affected his entire leg, excluding the hip.  - He reports no swelling, skin color changes, or redness in the affected leg.  - He also reports no recent falls or injuries.  - Despite trying various remedies such as Bengay, Advil, hot water soaks, and sitting in a tub, the pain persisted.  - However, the pain  has since resolved and he is now able to walk normally.  - This was his first experience with such severe leg pain, although he has had occasional leg cramps in the past that would resolve upon walking.      ROS: 10 points review of systems were negative except mentioned in HPI.       PAST MEDICAL HISTORY:   Past Medical History:   Diagnosis Date   • Hypertension      PAST SURGICAL HISTORY: No past surgical history on file.   MEDICATIONS:   Current Outpatient Medications   Medication Sig   • buPROPion XL (WELLBUTRIN XL) 300 MG 24 hr tablet Take 1 tablet by mouth daily.   • busPIRone (BUSPAR) 5 MG tablet Take 3 tablets by mouth in the morning and 3 tablets in the evening.   • escitalopram (LEXAPRO) 20 MG tablet Take 1 tablet by mouth daily.   • hydrOXYzine (ATARAX) 25 MG tablet Take 1 tablet by mouth nightly as needed for Itching.   • losartan (COZAAR) 25 MG tablet TAKE 1 TABLET BY MOUTH DAILY   • timolol (TIMOPTIC) 0.5 % ophthalmic solution [None received]   • Blood Pressure Monitor Kit Take blood pressure daily   • latanoprost (XALATAN) 0.005 % ophthalmic solution Place 1 drop into both eyes nightly.   • Lifitegrast 5 % Solution Place 1 drop into both eyes in the morning and 1 drop in the evening.   • clotrimazole (LOTRIMIN) 1 % cream APPLY EXTERNALLY TO THE AFFECTED AREA 2 TO 3 TIMES DAILY     No current facility-administered medications for this visit.     ALLERGIES: ALLERGIES:  No Known Allergies  FAMILY HISTORY:   Family History   Problem Relation Age of Onset   • Diabetes Mother    • Obesity Mother    • Hypertension Mother    • Cervical cancer Mother    • Cancer, Prostate Neg Hx    • Cancer, Colon Neg Hx    • Cancer, Breast Neg Hx       SOCIAL HISTORY:   Social History     Tobacco Use   • Smoking status: Never   • Smokeless tobacco: Never   Vaping Use   • Vaping status: never used   Substance Use Topics   • Alcohol use: Yes     Comment: socially   • Drug use: Never    Social History Narrative    (none)      Patient's medications, allergies, past medical, surgical, social and family histories were reviewed and updated as appropriate.    O: Visit Vitals  /71 (BP Location: RUE - Right upper extremity, Patient Position: Sitting, Cuff Size: Regular)   Pulse (!) 60   Temp 96.6 °F (35.9 °C) (Temporal)   Wt 101.6 kg (224 lb)   BMI 32.80 kg/m²     Physical Exam  Constitutional:       General: He is not in acute distress.     Appearance: Normal appearance. He is not ill-appearing.   HENT:      Mouth/Throat:      Mouth: Mucous membranes are moist.      Pharynx: Oropharynx is clear.   Eyes:      Extraocular Movements: Extraocular movements intact.      Conjunctiva/sclera: Conjunctivae normal.   Cardiovascular:      Rate and Rhythm: Normal rate and regular rhythm.      Pulses: Normal pulses.      Heart sounds: Normal heart sounds. No murmur heard.  Pulmonary:      Effort: Pulmonary effort is normal. No respiratory distress.      Breath sounds: Normal breath sounds.   Musculoskeletal:         General: Normal range of motion.   Skin:     General: Skin is warm.   Neurological:      Mental Status: He is alert and oriented to person, place, and time. Mental status is at baseline.   Psychiatric:         Mood and Affect: Mood normal.         Behavior: Behavior normal.         A/P: 46 year old male presenting for follow-up       1. Depression/anxiety: Stable.  - Schedule with Dr. Holley for counseling at next availability  - Maintain current medications: Wellbutrin, buspirone, Lexapro, and hydroxyzine.  - Refills for all medications provided.  - Forms signed and provided to patient    2. Right leg pain.  - Experienced severe pain for about a week, now resolved. No specific cause identified. No swelling, redness, or changes in skin color noted. Pain described as soreness and cramping.  - Given symptoms have resolved, will monitor and advise patient to return to the clinic or go to ER if symptoms recur.    3. Health maintenance.  -  Annual physical examination recommended within the next 1 to 2 months.  - Blood work ordered; should be completed while fasting for accurate results.      Problem List Items Addressed This Visit        Mental Health    WADE (generalized anxiety disorder)    Relevant Medications    busPIRone (BUSPAR) 5 MG tablet    escitalopram (LEXAPRO) 20 MG tablet    hydrOXYzine (ATARAX) 25 MG tablet   Other Visit Diagnoses       Right leg pain    -  Primary      Moderate major depression  (CMD)        Relevant Medications    buPROPion XL (WELLBUTRIN XL) 300 MG 24 hr tablet               Patient education completed on disease process, etiology & prognosis. Medical compliance with plan discussed and risks of non-compliance reviewed. Proper usage and side effects of medications reviewed & discussed. Return to clinic as clinically indicated as discussed with patient. Patient is verbalized understanding of and agreement with plan.    Consent obtained by patient to use JAMAR co- to populate note.      RTC in 1-2 months for YASMANY Veliz,   PGY-3, Family Medicine    English

## 2025-07-27 NOTE — ED BEHAVIORAL HEALTH ASSESSMENT NOTE - REFERRED BY
Attending note ([place]): Patient endorsed to me at signout noted on vitals previously temperature 101.4 F and heart rate 148 in setting of vomiting and abdominal pain concern for possible sepsis from intra-abdominal source starting empiric antibiotics and fluids ordered cultures right upper ultrasound already ordered we will also order CT abdomen pelvis to further evaluate.  Disposition pending review of labs and imaging. Attending note ([place]): Patient feeling much better tolerating p.o. labs nonactionable lactate not elevated white blood cell count not elevated seems unlikely bacterial sepsis at this time.  Vitals have normalized.  Additionally CT imaging and right upper quadrant ultrasound obtained is no evidence of cholecystitis.  Abdomen is soft and nontender.  Incidental findings of adenopathy noted on CT imaging given no cough or other respiratory complaints and resolution of symptoms recommendation is for close interval outpatient follow-up and further imaging as warranted.  Discussed this with the patient and family at bedside they are agreeable will follow-up with PCP plan to discharge Rx for Zofran in case of recurrence of vomiting/nausea and advise strict return precautions. Self